# Patient Record
Sex: FEMALE | Race: WHITE | NOT HISPANIC OR LATINO | Employment: OTHER | URBAN - METROPOLITAN AREA
[De-identification: names, ages, dates, MRNs, and addresses within clinical notes are randomized per-mention and may not be internally consistent; named-entity substitution may affect disease eponyms.]

---

## 2017-01-09 ENCOUNTER — TRANSFERRED RECORDS (OUTPATIENT)
Dept: HEALTH INFORMATION MANAGEMENT | Facility: CLINIC | Age: 62
End: 2017-01-09

## 2017-01-19 ENCOUNTER — TRANSFERRED RECORDS (OUTPATIENT)
Dept: HEALTH INFORMATION MANAGEMENT | Facility: CLINIC | Age: 62
End: 2017-01-19

## 2017-02-22 ENCOUNTER — PRE VISIT (OUTPATIENT)
Dept: ORTHOPEDICS | Facility: CLINIC | Age: 62
End: 2017-02-22

## 2017-02-22 NOTE — TELEPHONE ENCOUNTER
1.  Date/reason for appt: 6/28/17 -- degenerative disk disease  2.  Referring provider: Minerva Tobias  3.  Call to patient (Yes / No - short description): no, transferred from Call Center  4.  Previous care at / records requested from: [facility] -- faxed records request.  5.  Other: Spoke to pt, she has records at:    -[facility] (Dr. Vahid Lora)     -[facility] [place] -- pt will fax her reports to me    -PT at [facility] (Dr. Gould)        Emailed TIFF's to [email or web address].

## 2017-02-22 NOTE — TELEPHONE ENCOUNTER
Received ROIs, faxed with requests.    Received Radiology reports from pt.  Will forward to clinic.    MRI Lspine 1/19/17 at [facility] (requested image) ---image in Pacs     MRI Pelvis 1/19/17 at [facility] (requested image) ---image in Pacs    Lumbar Nerve Block 3/23/16 at [facility]    Right L5-S1 Facet joint injection at [facility]

## 2017-02-23 NOTE — TELEPHONE ENCOUNTER
Records received from [facility].   Included:  Office notes: 2/25/08, 5/12/11, 5/16/11, 10/19/11, 10/21/11, 10/24/11, 10/28/11, 11/2/11, 11/9/11, 11/16/11, 11/18/11, 11/21/11, 9/1/16, 8/3/16, 8/9/16, 8/16/16, 8/25/16, 9/1/16  Radiology reports: xray lumbar on 6/27/13, 9/19/13- [facility], MR lumbar on 10/29/15- [facility], CT lumbar on 11/12/15- [facility], injection L5-S on 3/2/16-[facility]

## 2017-02-23 NOTE — TELEPHONE ENCOUNTER
Records received from [facility].   Included:  Office notes: 1/19/17, 1/9/17, 11/15/16, 11/3/16, 10/3/16, 9/21/16  Other: injection on 10/3/16 from [facility], 1/19/17, 11/15/16, 9/21/16    Missing/needed records: 2013 L4-5 fusion at [facility]

## 2017-02-24 NOTE — TELEPHONE ENCOUNTER
Received imaging CD from [facility], sent to [facility] by .    Faxed imaging request to [facility].  Faxed request to [facility] for op-notes.

## 2017-02-27 NOTE — TELEPHONE ENCOUNTER
L4-L5 op-note on 3/12/13 with implant records received from [facility]. Will forward to clinic but records are blurry, will need to get additional copy.

## 2017-04-24 NOTE — TELEPHONE ENCOUNTER
Op Note: 3/19/13 TLIF right side L4-5 / Posterior Fusion L4-5 / Bilat facetectomy/formamitomy/ decompression L4-05 / Insertion fusion devices/ Posterior pedicle instrumentation bilat L4-5 (Surgeon: / Jose Pennington @ [place]) - received & forwarded to clinic.

## 2017-04-24 NOTE — TELEPHONE ENCOUNTER
Per email from pt, she saw also Dr. Jose Pennington at [facility].  Emailed TIFF for pt to sign and return to me.

## 2017-04-26 NOTE — TELEPHONE ENCOUNTER
Records received from [facility]. Imaging disc is being mailed.   Included  Office notes: 4/7/16, 3/18/16, 2/25/16, 11/12/15, 11/2/15, 2013, 2012  Radiology reports: lumbar nerve branch blocks on 3/23/16-[facility]   Injection on 3/2/16-[facility]   CT lumbar on 11/12/15-[facility]    Xray lumbar on 4/25/13-[facility]    MR lumbar on 11/17/12-[facility]   Op/Procedure notes: 3/12/13 op-note included from [facility]   Other: EMG on 4/19/16- [facility]

## 2017-04-26 NOTE — TELEPHONE ENCOUNTER
Received 2 imaging discs from [facility].  Sent to [facility] by .

## 2017-06-25 ASSESSMENT — ENCOUNTER SYMPTOMS
POLYPHAGIA: 0
BLOOD IN STOOL: 0
EXERCISE INTOLERANCE: 1
BLOATING: 0
BOWEL INCONTINENCE: 0
TACHYCARDIA: 1
VOMITING: 0
RECTAL BLEEDING: 0
SLEEP DISTURBANCES DUE TO BREATHING: 0
CONSTIPATION: 1
MUSCLE CRAMPS: 0
LEG SWELLING: 0
BACK PAIN: 1
DIARRHEA: 0
LEG PAIN: 1
RECTAL PAIN: 0
FEVER: 0
INCREASED ENERGY: 1
STIFFNESS: 1
HEARTBURN: 0
SYNCOPE: 0
ALTERED TEMPERATURE REGULATION: 0
PALPITATIONS: 1
ABDOMINAL PAIN: 0
MYALGIAS: 1
HYPERTENSION: 1
CHILLS: 0
LIGHT-HEADEDNESS: 1
JAUNDICE: 0
DECREASED APPETITE: 0
HALLUCINATIONS: 0
POLYDIPSIA: 0
HYPOTENSION: 1
JOINT SWELLING: 1
NIGHT SWEATS: 0
CLAUDICATION: 0
ARTHRALGIAS: 1
NECK PAIN: 0
ORTHOPNEA: 0
MUSCLE WEAKNESS: 1
FATIGUE: 1
WEIGHT LOSS: 0
WEIGHT GAIN: 0
NAUSEA: 0

## 2017-06-28 ENCOUNTER — OFFICE VISIT (OUTPATIENT)
Dept: ORTHOPEDICS | Facility: CLINIC | Age: 62
End: 2017-06-28

## 2017-06-28 VITALS — BODY MASS INDEX: 22.02 KG/M2 | HEIGHT: 64 IN | WEIGHT: 129 LBS

## 2017-06-28 DIAGNOSIS — M46.1 SACROILIITIS (H): Primary | ICD-10-CM

## 2017-06-28 RX ORDER — ALENDRONATE SODIUM 70 MG/1
70 TABLET ORAL WEEKLY
COMMUNITY
Start: 2009-03-01 | End: 2021-10-12

## 2017-06-28 RX ORDER — ZOLPIDEM TARTRATE 5 MG/1
5 TABLET ORAL
COMMUNITY
Start: 2016-12-21

## 2017-06-28 RX ORDER — LISINOPRIL 20 MG/1
TABLET ORAL
COMMUNITY
End: 2017-08-03

## 2017-06-28 RX ORDER — GINSENG 100 MG
50 CAPSULE ORAL
COMMUNITY
Start: 2017-03-01 | End: 2019-10-24

## 2017-06-28 RX ORDER — DIPHENHYDRAMINE HYDROCHLORIDE 25 MG/1
1 TABLET ORAL EVERY EVENING
COMMUNITY
Start: 2016-06-01 | End: 2019-02-21

## 2017-06-28 RX ORDER — AMLODIPINE BESYLATE 10 MG/1
5 TABLET ORAL EVERY MORNING
COMMUNITY
Start: 2017-06-22 | End: 2019-02-21

## 2017-06-28 RX ORDER — HYDROCHLOROTHIAZIDE 25 MG/1
12.5 TABLET ORAL EVERY MORNING
COMMUNITY
Start: 2017-06-22

## 2017-06-28 RX ORDER — ZINC OXIDE 13 %
1 CREAM (GRAM) TOPICAL
COMMUNITY
Start: 2017-01-01 | End: 2021-10-12

## 2017-06-28 NOTE — LETTER
6/28/2017       RE: Inge Son  [address] [place] [address]     Dear Colleague,    Thank you for referring your patient, Inge Son, to the [facility] at [facility]. Please see a copy of my visit note below.      Spine Surgery Consultation    REFERRING PHYSICIAN: Era Tobias            Chief Concern:   Evaluate for R sacroilitis           History of Present Illness:   This patient is a 62 year old female with a significant past medical history of CVA 10 years ago with resultant L hearing loss who presents today for evaluation for R low back pain.     Underwent 2013 L4-5 TLIF at [facility].  Did well with excellent pain relief until early 2015.  Played a significant amount of golf at that time, and had new onset R low back pain.  Present since that time, and disabling, resulting in activity restriction.      Prior SI joint injection helped some.  Has also had medial bundle blocks, minimally helpful.    Referred to Dr Neal for evaluation.    Symptom Profile  Location of symptoms:   R PSIS region  Onset:   Insidious 2 years ago  Duration of symptoms:   2 years  Quality of symptoms:   achy  Severity:   severe  Exacerbating:    walking  Alleviate:   Rest  Radiation:  R poterior leg above knee level      Pain medications:  None      Oswestry Disability Score: 52  Dyymtc56: 9 physical, 8 mental   Pain Scale: 7 back, 5.5 R leg    Previous spine surgery includes:    2013 TLIF L4-5  ([facility])            Past Medical History:   CVA 10 years ago  Osteopenia         Past Surgical History:   2013 TLIF L4-5  ([facility])         Social History:   Smoking: none  Alcohol: rare  Street drugs: none  Living situation:  With   Ambulatory status:  indepenent    Social History   Substance Use Topics     Smoking status: Not on file     Smokeless tobacco: Not on file     Alcohol use Not on file            Family History:   Denies family history of bleeding  or clotting disorders  No family history on file.         Allergies:   No Known Allergies         Medications:   Anticoagulants:  None  Norvasc  Fosamax          Review of Systems:   10 point review of systems was performed, and was negative except as noted in HPI.         Physical Exam:   BMI: Body mass index is 22.16 kg/(m^2).   General: awake, alert, cooperative, no apparent distress, appears stated age.    HEENT: normal  Respiratory: breathing non-labored  Cardiovascular: peripheral pulses palpable and symmetric, capillary refill < 2sec  Abdomen:  Nondistended  Skin: no rashes or lesions  Heme:  No petechiae  Neurological: CN II-XII grossly intact  Musculoskeletal:      Spine:   Stands erect with no list or stoop   Overall neutral sagittal alignment   No scoliotic curves grossly evident, negative moody test     No tenderness to spine   No pain with stork, or looking back over shoulder and loading facets         --------------------------------          L2-3: Hip flexion L and R 5/5 strength         L4:  Knee extension L and R 5/5 strength        L5:  Foot / EHL dorsiflexion L and R 5/5 strength        S1:  Plantarflexion  L and R 5/5 strength         --------------------------------         Sacroiliac exam:   + Daya finger   + PSIS tenderness   - Chapin   - AP compression   - Thigh thrust   - Lateral compression   - Gaenslen's   - Sacral thrust           Imaging:   CT:  Posterolateral fusion of L4-5 TLIF.  Fusion of interbody region, though not as robust as would be expected.      MRI L spine:  No nerve root impingement.             Assessment and Plan:   Assessment:  Suspect R sacroiliitis based on prior SI injection with improvement, though exam not consistent.      Lengthy discussion today of possible pain generators, including of the SI joint.  Does not appear to have            Plan:  1. CT guided R SI injection (local anesthetic only)  1. Do aggravating activities immediately before and after to see if  significantly relieves  2. Follow up after injection to discuss next steps      Devon Saleh MD  Orthopaedic Surgery PGY-4  Pager:  [phone]    I saw and evaluated the patient on the day of the visit and I formulated the plan.  Will Neal MD        CC  Copy to patient     [address] [facility] [address]

## 2017-06-28 NOTE — MR AVS SNAPSHOT
"              After Visit Summary   6/28/2017    Inge Son    MRN: [id number]           Patient Information     Date Of Birth          1955        Visit Information        Provider Department      6/28/2017 9:00 AM Will Neal MD [facility]        Today's Diagnoses     Sacroiliitis (H)    -  1       Follow-ups after your visit        Who to contact     Please call your clinic at [phone] to:    Ask questions about your health    Make or cancel appointments    Discuss your medicines    Learn about your test results    Speak to your doctor   If you have compliments or concerns about an experience at your clinic, or if you wish to file a complaint, please contact [facility] Patient Relations at [phone] or email us at [email or web address]         Additional Information About Your Visit        MyChart Information     [employer] gives you secure access to your electronic health record. If you see a primary care provider, you can also send messages to your care team and make appointments. If you have questions, please call your primary care clinic.  If you do not have a primary care provider, please call [phone] and they will assist you.      [employer] is an electronic gateway that provides easy, online access to your medical records. With [employer], you can request a clinic appointment, read your test results, renew a prescription or communicate with your care team.     To access your existing account, please contact your [facility] Clinic or call [phone] for assistance.        Care EveryWhere ID     This is your Care EveryWhere ID. This could be used by other organizations to access your [facility] medical records  [id number]        Your Vitals Were     Height BMI (Body Mass Index)                1.625 m (5' 3.98\") 22.16 kg/m2           Blood Pressure from Last 3 Encounters:   No data found for BP    Weight from " Last 3 Encounters:   06/28/17 58.5 kg (129 lb)               Primary Care Provider    None Specified       No primary provider on file.        Equal Access to Services     JOSE BARBA : Hadii aad ku hadbethanie Betancur, jena hurst, michael fierro, amparo carranza. So [facility] [phone].    ATENCIÓN: Si habla español, tiene a mosley disposición servicios gratuitos de asistencia lingüística. Llame al [phone].    We comply with applicable federal civil rights laws and [place] laws. We do not discriminate on the basis of race, color, national origin, age, disability sex, sexual orientation or gender identity.            Thank you!     Thank you for choosing [facility]  for your care. Our goal is always to provide you with excellent care. Hearing back from our patients is one way we can continue to improve our services. Please take a few minutes to complete the written survey that you may receive in the mail after your visit with us. Thank you!             Your Updated Medication List - Protect others around you: Learn how to safely use, store and throw away your medicines at www.disposemymeds.org.          This list is accurate as of: 6/28/17 11:59 PM.  Always use your most recent med list.                   Brand Name Dispense Instructions for use Diagnosis    acetaminophen-codeine 300-30 MG per tablet    TYLENOL #3     1 tablet as needed        alendronate 70 MG tablet    FOSAMAX     once a week        amLODIPine 10 MG tablet    NORVASC          BIOTIN 5000 5 MG Caps   Generic drug:  biotin           CALCIUM 600/VITAMIN D3 600-800 MG-UNIT Tabs   Generic drug:  Calcium Carb-Cholecalciferol           CENTRUM SILVER ADULT 50+ PO           hydrochlorothiazide 25 MG tablet    HYDRODIURIL          lisinopril 20 MG tablet    PRINIVIL/ZESTRIL          PROBIOTIC DAILY Caps           STOOL SOFTENER & LAXATIVE PO           SUPER B-COMPLEX Tabs           zinc 50 MG Tabs            zolpidem 5 MG tablet    AMBIEN

## 2017-06-28 NOTE — NURSING NOTE
"Reason For Visit:   Chief Complaint   Patient presents with     Back Pain     low right back pain       Primary MD: Dr. Minerva Tobias  Ref. MD: same      Occupation retired.  Date of injury: none    Date of surgery: 3/2013  Type of surgery: lumbar fusion, ANW.  Smoker: No      Ht 1.625 m (5' 3.98\")  Wt 58.5 kg (129 lb)  BMI 22.16 kg/m2    Pain Assessment  Patient Currently in Pain: Yes  0-10 Pain Scale:  (0 - 9)  Primary Pain Location: Back  Pain Orientation: Lower, Right  Pain Descriptors: Other (comment) (sting / pinch)  Alleviating Factors: Other (comment) (lying flat)  Aggravating Factors: Walking    Oswestry (CICI) Questionnaire    OSWESTRY DISABILITY INDEX 6/25/2017   SECTION 1-PAIN INTENSITY 3  The pain is fairly severe at the moment.   SECTION 2-PERSONAL CARE (WASHING,DRESSING,ETC.) 2  It is painful to look after myself and I am slow and careful.   SECTION 3-LIFTING 3  Pain prevents me from lifting heavy weights but I can manage light to medium weights if they are conveniently positioned.   SECTION 4-WALKING 2  Pain prevents me from walking more than a quarter of a mile.   SECTION 5-SITTING 3  Pain prevents me from sitting for more than half an hour   SECTION 6-STANDING 3  Pain prevents me from standing for more than half an hour.   SECTION 7-SLEEPING 2  Because of pain I have less than 6 hours sleep.   SECTION 8-SEX LIFE (IF APPLICABLE) 3  My sex life is severely restricted by pain.   SECTION 9-SOCIAL LIFE 3  Pain has restricted my social life and I do not go out as often.   SECTION 10-TRAVELING 2  Pain is bad but I am able to manage trips over two hours.   Oswestry Disability Index: Count 10   CICI: Total Score = SUM (total for answered questions) 26   Computed Oswestry Score 52 (%)                      Numeric Rating Scale:  VAS Scores     VAS Survey 6/25/2017   What is your level of back pain during the last week: 7.0   What is your level of RIGHT leg pain during the last week: 5.5   What is your level of " LEFT leg pain during the last week: 0   What is your level of neck pain during the last week: 0   What is your level of RIGHT arm pain during the last week: 0   What is your level of LEFT arm pain during the last week: 0                Promis 10 Assessment    PROMIS 10 6/25/2017   In general, would you say your health is: Good = 3   In general, would you say your quality of life is: Poor = 1   In general, how would you rate your physical health? Fair = 2   In general, how would you rate your mental health, including your mood and your ability to think? Good = 3   In general, how would you rate your satisfaction with your social activities and relationships? Poor = 1   In general, please rate how well you carry out your usual social activities and roles Fair = 2   To what extent are you able to carry out your everyday physical activities such as walking, climbing stairs, carrying groceries, or moving a chair? A Little = 2   How often have you been bothered by emotional problems such as feeling anxious, depressed or irritable? Sometimes = 3   How would you rate your fatigue on average? Moderate = 3   How would you rate your pain on average?   0 = No Pain  to  10 = Worst Imaginable Pain 7   Global Physical Health Score : Raw Score 9 (SUM : G03 - G06 - G07 - G08)   Global Mentall Health Score : Raw Score 8 (SUM : G02 - G04 - G05 - G10)   Total (Physical + Mental Health Score) 17

## 2017-06-28 NOTE — PROGRESS NOTES
Spine Surgery Consultation    REFERRING PHYSICIAN: Era Tobias            Chief Concern:   Evaluate for R sacroilitis           History of Present Illness:   This patient is a 62 year old female with a significant past medical history of CVA 10 years ago with resultant L hearing loss who presents today for evaluation for R low back pain.     Underwent 2013 L4-5 TLIF at [facility] [place].  Did well with excellent pain relief until early 2015.  Played a significant amount of golf at that time, and had new onset R low back pain.  Present since that time, and disabling, resulting in activity restriction.      Prior SI joint injection helped some.  Has also had medial bundle blocks, minimally helpful.    Referred to Dr Neal for evaluation.    Symptom Profile  Location of symptoms:   R PSIS region  Onset:   Insidious 2 years ago  Duration of symptoms:   2 years  Quality of symptoms:   achy  Severity:   severe  Exacerbating:    walking  Alleviate:   Rest  Radiation:  R poterior leg above knee level      Pain medications:  None      Oswestry Disability Score: 52  Hqaehf95: 9 physical, 8 mental   Pain Scale: 7 back, 5.5 R leg    Previous spine surgery includes:    2013 TLIF L4-5  ([facility])            Past Medical History:   CVA 10 years ago  Osteopenia         Past Surgical History:   2013 TLIF L4-5  ([facility])         Social History:   Smoking: none  Alcohol: rare  Street drugs: none  Living situation:  With   Ambulatory status:  indepenent    Social History   Substance Use Topics     Smoking status: Not on file     Smokeless tobacco: Not on file     Alcohol use Not on file            Family History:   Denies family history of bleeding or clotting disorders  No family history on file.         Allergies:   No Known Allergies         Medications:   Anticoagulants:  None  Norvasc  Fosamax          Review of Systems:   10 point review of systems was performed, and was negative except as noted in HPI.         Physical  Exam:   BMI: Body mass index is 22.16 kg/(m^2).   General: awake, alert, cooperative, no apparent distress, appears stated age.    HEENT: normal  Respiratory: breathing non-labored  Cardiovascular: peripheral pulses palpable and symmetric, capillary refill < 2sec  Abdomen:  Nondistended  Skin: no rashes or lesions  Heme:  No petechiae  Neurological: CN II-XII grossly intact  Musculoskeletal:      Spine:   Stands erect with no list or stoop   Overall neutral sagittal alignment   No scoliotic curves grossly evident, negative moody test     No tenderness to spine   No pain with stork, or looking back over shoulder and loading facets         --------------------------------          L2-3: Hip flexion L and R 5/5 strength         L4:  Knee extension L and R 5/5 strength        L5:  Foot / EHL dorsiflexion L and R 5/5 strength        S1:  Plantarflexion  L and R 5/5 strength         --------------------------------         Sacroiliac exam:   + Daya finger   + PSIS tenderness   - Chapin   - AP compression   - Thigh thrust   - Lateral compression   - Gaenslen's   - Sacral thrust           Imaging:   CT:  Posterolateral fusion of L4-5 TLIF.  Fusion of interbody region, though not as robust as would be expected.      MRI L spine:  No nerve root impingement.             Assessment and Plan:   Assessment:  Suspect R sacroiliitis based on prior SI injection with improvement, though exam not consistent.      Lengthy discussion today of possible pain generators, including of the SI joint.  Does not appear to have            Plan:  1. CT guided R SI injection (local anesthetic only)  1. Do aggravating activities immediately before and after to see if significantly relieves  2. Follow up after injection to discuss next steps      Devon Saleh MD  Orthopaedic Surgery PGY-4  Pager:  [phone]    I saw and evaluated the patient on the day of the visit and I formulated the plan.  Will Neal MD        CC  Copy to patient      [address] [facility] [address]          Answers for HPI/ROS submitted by the patient on 6/25/2017   General Symptoms: Yes  Skin Symptoms: No  HENT Symptoms: No  EYE SYMPTOMS: No  HEART SYMPTOMS: Yes  LUNG SYMPTOMS: No  INTESTINAL SYMPTOMS: Yes  URINARY SYMPTOMS: No  GYNECOLOGIC SYMPTOMS: No  BREAST SYMPTOMS: No  SKELETAL SYMPTOMS: Yes  BLOOD SYMPTOMS: No  NERVOUS SYSTEM SYMPTOMS: No  MENTAL HEALTH SYMPTOMS: No  Fever: No  Loss of appetite: No  Weight loss: No  Weight gain: No  Fatigue: Yes  Night sweats: No  Chills: No  Increased stress: Yes  Excessive hunger: No  Excessive thirst: No  Feeling hot or cold when others believe the temperature is normal: No  Loss of height: No  Post-operative complications: No  Surgical site pain: No  Hallucinations: No  Change in or Loss of Energy: Yes  Hyperactivity: No  Confusion: No  Chest pain or pressure: Yes  Fast or irregular heartbeat: Yes  Pain in legs with walking: Yes  Swelling in feet or ankles: No  Trouble breathing while lying down: No  Fingers or Toes appear blue: No  High blood pressure: Yes  Low blood pressure: Yes  Fainting: No  Murmurs: No  Chest pain on exertion: Yes  Chest pain at rest: No  Cramping pain in leg during exercise: No  Pacemaker: No  Varicose veins: No  Edema or swelling: No  Fast heart beat: Yes  Wake up at night with shortness of breath: No  Heart flutters: No  Light-headedness: Yes  Exercise intolerance: Yes  Heart burn or indigestion: No  Nausea: No  Vomiting: No  Abdominal pain: No  Bloating: No  Constipation: Yes  Diarrhea: No  Blood in stool: No  Black stools: No  Rectal or Anal pain: No  Fecal incontinence: No  Rectal bleeding: No  Yellowing of skin or eyes: No  Vomit with blood: No  Change in stools: No  Hemorrhoids: No  Back pain: Yes  Muscle aches: Yes  Neck pain: No  Swollen joints: Yes  Joint pain: Yes  Bone pain: Yes  Muscle cramps: No  Muscle weakness: Yes  Joint stiffness: Yes  Bone fracture: No

## 2017-07-12 ENCOUNTER — TELEPHONE (OUTPATIENT)
Dept: ORTHOPEDICS | Facility: CLINIC | Age: 62
End: 2017-07-12

## 2017-07-12 DIAGNOSIS — M46.1 SACROILIITIS (H): Primary | ICD-10-CM

## 2017-07-12 NOTE — TELEPHONE ENCOUNTER
Pt. Called to report results of Right SacroIliac Joint injection NO steroid done 7-3-17 at [facility]. Pt. States 100% relief of Right leg pain.   She states rated Right LBP & Right leg pain 5-6 before & half an hour after rated 2-3,  1 hour after rated 1.  She walked 3/4 mile after & felt much better.  She went golfing same day as instructed to increase activity after injection & had Return of back & Right leg pain rated 5.    Reviewed reports & imaging with KOBI Jones who ordered CT guided Right SacroIliac  Joint injection with steroid  & RTC appt. To go over these injections & make plan.  Pt stated understanding & will schedule.  Call back prn.  V.[email or web address].Olivia PEACE//Amada Hill RN.

## 2017-08-01 ASSESSMENT — ENCOUNTER SYMPTOMS
MYALGIAS: 0
ORTHOPNEA: 0
MUSCLE WEAKNESS: 0
NECK PAIN: 0
STIFFNESS: 1
ARTHRALGIAS: 1
HYPOTENSION: 1
POOR WOUND HEALING: 0
SLEEP DISTURBANCES DUE TO BREATHING: 0
SKIN CHANGES: 0
JOINT SWELLING: 1
PALPITATIONS: 0
TACHYCARDIA: 0
SYNCOPE: 0
LEG PAIN: 0
MUSCLE CRAMPS: 0
LEG SWELLING: 1
CLAUDICATION: 0
BACK PAIN: 1
LIGHT-HEADEDNESS: 1
EXERCISE INTOLERANCE: 1
HYPERTENSION: 1
NAIL CHANGES: 0

## 2017-08-03 ENCOUNTER — OFFICE VISIT (OUTPATIENT)
Dept: ORTHOPEDICS | Facility: CLINIC | Age: 62
End: 2017-08-03

## 2017-08-03 VITALS — WEIGHT: 128.5 LBS | BODY MASS INDEX: 21.94 KG/M2 | HEIGHT: 64 IN

## 2017-08-03 DIAGNOSIS — M46.1 SACROILIITIS (H): Primary | ICD-10-CM

## 2017-08-03 RX ORDER — POTASSIUM CHLORIDE 600 MG/1
8 TABLET, FILM COATED, EXTENDED RELEASE ORAL
COMMUNITY

## 2017-08-03 NOTE — MR AVS SNAPSHOT
"              After Visit Summary   8/3/2017    Inge Son    MRN: [id number]           Patient Information     Date Of Birth          1955        Visit Information        Provider Department      8/3/2017 1:45 PM Will Neal MD [facility]        Today's Diagnoses     Sacroiliitis (H)    -  1       Follow-ups after your visit        Follow-up notes from your care team     Return if symptoms worsen or fail to improve.      Who to contact     Please call your clinic at [phone] to:    Ask questions about your health    Make or cancel appointments    Discuss your medicines    Learn about your test results    Speak to your doctor   If you have compliments or concerns about an experience at your clinic, or if you wish to file a complaint, please contact [facility] Patient Relations at [phone] or email us at [email or web address]         Additional Information About Your Visit        MyChart Information     [employer] gives you secure access to your electronic health record. If you see a primary care provider, you can also send messages to your care team and make appointments. If you have questions, please call your primary care clinic.  If you do not have a primary care provider, please call [phone] and they will assist you.      [employer] is an electronic gateway that provides easy, online access to your medical records. With [employer], you can request a clinic appointment, read your test results, renew a prescription or communicate with your care team.     To access your existing account, please contact your [facility] or call [phone] for assistance.        Care EveryWhere ID     This is your Care EveryWhere ID. This could be used by other organizations to access your [facility] medical records  [id number]        Your Vitals Were     Height BMI (Body Mass Index)                1.63 m (5' 4.17\") 21.94 kg/m2  "          Blood Pressure from Last 3 Encounters:   No data found for BP    Weight from Last 3 Encounters:   08/03/17 58.3 kg (128 lb 8 oz)   06/28/17 58.5 kg (129 lb)              Today, you had the following     No orders found for display         Today's Medication Changes          These changes are accurate as of: 8/3/17 11:59 PM.  If you have any questions, ask your nurse or doctor.               Stop taking these medicines if you haven't already. Please contact your care team if you have questions.     lisinopril 20 MG tablet   Commonly known as:  PRINIVIL/ZESTRIL                    Primary Care Provider    None Specified       No primary provider on file.        Equal Access to Services     [facility]: Hadedie Betancur, jena hurst, michael fierro, amparo partida . So [facility] [phone].    ATENCIÓN: Si habla español, tiene a mosley disposición servicios gratuitos de asistencia lingüística. Llame al [phone].    We comply with applicable federal civil rights laws and [place] laws. We do not discriminate on the basis of race, color, national origin, age, disability sex, sexual orientation or gender identity.            Thank you!     Thank you for choosing [facility]  for your care. Our goal is always to provide you with excellent care. Hearing back from our patients is one way we can continue to improve our services. Please take a few minutes to complete the written survey that you may receive in the mail after your visit with us. Thank you!             Your Updated Medication List - Protect others around you: Learn how to safely use, store and throw away your medicines at www.disposemymeds.org.          This list is accurate as of: 8/3/17 11:59 PM.  Always use your most recent med list.                   Brand Name Dispense Instructions for use Diagnosis    acetaminophen-codeine 300-30 MG per tablet    TYLENOL #3     1 tablet as needed         alendronate 70 MG tablet    FOSAMAX     once a week        amLODIPine 10 MG tablet    NORVASC          BIOTIN 5000 5 MG Caps   Generic drug:  biotin           CALCIUM 600/VITAMIN D3 600-800 MG-UNIT Tabs   Generic drug:  Calcium Carb-Cholecalciferol           CENTRUM SILVER ADULT 50+ PO           hydrochlorothiazide 25 MG tablet    HYDRODIURIL          KLOR-CON 8 MEQ CR tablet   Generic drug:  potassium chloride      Take 8 mEq by mouth daily        MAGNESIUM OXIDE PO      Take 400 mg by mouth daily        PROBIOTIC DAILY Caps           STOOL SOFTENER & LAXATIVE PO           SUPER B-COMPLEX Tabs           zinc 50 MG Tabs           zolpidem 5 MG tablet    AMBIEN

## 2017-08-03 NOTE — NURSING NOTE
"Reason For Visit:   Chief Complaint   Patient presents with     RECHECK     right SI pain, s/p injections 7/3/17 and 7/24/17 (good relief)       Primary MD: Dr. Minerva Tobias  Cardiologist: Dr. Gibson, [facility]  Ref. MD: Dr. Minerva Tobias      Occupation retired .  Date of injury: none    Date of surgery: 3/2013  Type of surgery: lumbar fusion, ANW.  Smoker: No      Ht 1.63 m (5' 4.17\")  Wt 58.3 kg (128 lb 8 oz)  BMI 21.94 kg/m2    Pain Assessment  Patient Currently in Pain: Yes  0-10 Pain Scale: 1  Primary Pain Location: Other (Comment) (SI)  Pain Orientation: Right  Other Pain Locations: right posterior thigh  Pain Descriptors: Patient unable to describe, Other (comment) (pain is barely there)  Alleviating Factors: Other (comment) (getting up helps)  Aggravating Factors: Sitting    Oswestry (CICI) Questionnaire    OSWESTRY DISABILITY INDEX 8/1/2017   Section 1 - Pain intensity 1   Section 2 - Personal care (washing, dressing, etc.)  0   Section 3 - Lifting 2   Section 4 - Walking 1   Section 5 - Sitting 2   Section 6 - Standing 2   Section 7 - Sleeping 0   Section 8 - Sex life (if applicable) 1   Section 9 - Social life 2   Section 10 - Traveling 1   Count 10   Sum 12   Oswestry Score (%) 24   SECTION 1-PAIN INTENSITY The pain is very mild at the moment.   SECTION 2-PERSONAL CARE (WASHING,DRESSING,ETC.) I can look after myself normally without causing additional pain.   SECTION 3-LIFTING Pain prevents me from lifting heavy weights off the floor but I can manage if they are conveniently positioned, e.g. on a table.   SECTION 4-WALKING Pain prevents me from walking more than one mile.   SECTION 5-SITTING Pain prevents me from sitting for more than 1 hour.   SECTION 6-STANDING Pain prevents me from standing for more than 1 hour.   SECTION 7-SLEEPING My sleep is never interrupted by pain.   SECTION 8-SEX LIFE (IF APPLICABLE) My sex life is normal but causes some additional pain.   SECTION " 9-SOCIAL LIFE Pain has no significant effect on my social life apart from limiting my more energetic interests, e.g. sport, etc.   SECTION 10-TRAVELING I can travel anywhere but it gives me additional pain.   Oswestry Disability Index: Count 10   CICI: Total Score = SUM (total for answered questions) 12   Computed Oswestry Score 24 (%)                      Numeric Rating Scale:  VAS Scores     VAS Survey 8/1/2017   What is your level of back pain during the last week: 2.5   What is your level of RIGHT leg pain during the last week: 2.5   What is your level of LEFT leg pain during the last week: 0   What is your level of neck pain during the last week: 0   What is your level of RIGHT arm pain during the last week: 0   What is your level of LEFT arm pain during the last week: 0                Promis 10 Assessment    PROMIS 10 8/1/2017   In general, would you say your health is: Good   In general, would you say your quality of life is: Good   In general, how would you rate your physical health? Fair   In general, how would you rate your mental health, including your mood and your ability to think? Very good   In general, how would you rate your satisfaction with your social activities and relationships? Good   In general, please rate how well you carry out your usual social activities and roles Good   To what extent are you able to carry out your everyday physical activities such as walking, climbing stairs, carrying groceries, or moving a chair? Mostly   How often have you been bothered by emotional problems such as feeling anxious, depressed or irritable? Sometimes   How would you rate your fatigue on average? Moderate   How would you rate your pain on average?   0 = No Pain  to  10 = Worst Imaginable Pain 3   Global Physical Health Score : Raw Score 13 (SUM : G03 - G06 - G07 - G08)   Global Mentall Health Score : Raw Score 13 (SUM : G02 - G04 - G05 - G10)   Total (Physical + Mental Health Score) 26   In general,  would you say your health is: 3   In general, would you say your quality of life is: 3   In general, how would you rate your physical health? 2   In general, how would you rate your mental health, including your mood and your ability to think? 4   In general, how would you rate your satisfaction with your social activities and relationships? 3   In general, please rate how well you carry out your usual social activities and roles. (This includes activities at home, at work and in your community, and responsibilities as a parent, child, spouse, employee, friend, etc.) 3   To what extent are you able to carry out your everyday physical activities such as walking, climbing stairs, carrying groceries, or moving a chair? 4   In the past 7 days, how often have you been bothered by emotional problems such as feeling anxious, depressed, or irritable? 3   In the past 7 days, how would you rate your fatigue on average? 3   In the past 7 days, how would you rate your pain on average, where 0 means no pain, and 10 means worst imaginable pain? 3   Global Mental Health Score 13   Global Physical Health Score 13   PROMIS TOTAL - SUBSCORES 26   Pain question re-calculation - no clinical value 4

## 2017-08-03 NOTE — LETTER
8/3/2017       RE: Inge Son  [address] [facility] [place] [facility] [address]     Dear Colleague,    Thank you for referring your patient, Inge Son, to the [facility] at [facility]. Please see a copy of my visit note below.    REASON FOR VISIT: Recheck right SI joint pain    REFERRING PHYSICIAN: Established Patient     PRIMARY CARE PHYSICIAN: No primary care provider on file.    HISTORY OF PRESENT ILLNESS: Inge Son is a 62 year old female who returns to clinic today for further evaluation of her right sacroiliac joint pain.  She was previously seen on 6/28/17 and was sent for a CT-guided diagnostic injection.  That was performed on 7/3/17, and she reported 50% improvement in her symptoms at that time.  She later called for a repeat injection, which was performed on 7/24/17 with the addition of cortisone.  She states that she has gotten good relief from the injection.  She notes a slight increase in pain when seated for prolonged periods of time.  However, the grinding pain with walking has resolved.     Oswestry score: 24% (previously 52%)  Dwoiln91: 26 (previously 17)  Pain scale: 1 (previously 0-9)    PHYSICAL EXAM:   Constitutional - Patient is healthy, well-nourished and appears stated age.    BMI = 21.94    Respiratory - Patient is breathing normally and in no respiratory distress.   Skin - No suspicious rashes or lesions.   Psychiatric - Normal mood and affect.   Eyes - Visual acuity is normal to the written word.   ENT - Hearing intact to the spoken word.   GI - No abdominal distention.   Musculoskeletal - Non-antalgic gait without use of assistive devices.    IMAGING: The results of two CT-guided injections were reviewed today.  They show good placement of the injections. See full radiologic report in chart.    CLINICAL ASSESSMENT:   Right sacroiliitis    DISCUSSION/PLAN:   Inge is a 62 year old female who returned to clinic today to discuss the  results of her recent CT-guided right sacroiliac joint injections.  She reported good relief of her symptoms, confirming the SI joint as the source of her pain.  We discussed future treatment options, including regular cortisone injections versus SI joint fusion.  As long as she continues to have relief with the injections, she can continue this treatment.  If she reaches a point where injections are no longer helpful, and she cannot live with her symptoms, she can return to clinic to discuss alternative treatment options.  She understands and agrees with this plan.  She also complained of tightness in the hamstrings, and she can try gentle stretching on her own or with a physical therapist.  If she needs a referral, she can call the office.    All questions and concerns were answered to the patient's apparent satisfaction before leaving the clinic.     Thank you for allowing Dr. Neal and I to participate in the care of Inge Son.    Respectfully,  Olivia Jones PA-C    I saw and evaluated the patient on the day of the visit and I formulated the plan.  Will Neal MD      CC  Copy to patient  [address] [id number]    Again, thank you for allowing me to participate in the care of your patient.      Sincerely,    Will Neal MD

## 2017-08-04 NOTE — PROGRESS NOTES
REASON FOR VISIT: Recheck right SI joint pain    REFERRING PHYSICIAN: Established Patient     PRIMARY CARE PHYSICIAN: No primary care provider on file.    HISTORY OF PRESENT ILLNESS: Inge Son is a 62 year old female who returns to clinic today for further evaluation of her right sacroiliac joint pain.  She was previously seen on 6/28/17 and was sent for a CT-guided diagnostic injection.  That was performed on 7/3/17, and she reported 50% improvement in her symptoms at that time.  She later called for a repeat injection, which was performed on 7/24/17 with the addition of cortisone.  She states that she has gotten good relief from the injection.  She notes a slight increase in pain when seated for prolonged periods of time.  However, the grinding pain with walking has resolved.     Oswestry score: 24% (previously 52%)  Ddeydu95: 26 (previously 17)  Pain scale: 1 (previously 0-9)    PHYSICAL EXAM:   Constitutional - Patient is healthy, well-nourished and appears stated age.    BMI = 21.94    Respiratory - Patient is breathing normally and in no respiratory distress.   Skin - No suspicious rashes or lesions.   Psychiatric - Normal mood and affect.   Eyes - Visual acuity is normal to the written word.   ENT - Hearing intact to the spoken word.   GI - No abdominal distention.   Musculoskeletal - Non-antalgic gait without use of assistive devices.      IMAGING: The results of two CT-guided injections were reviewed today.  They show good placement of the injections. See full radiologic report in chart.    CLINICAL ASSESSMENT:   Right sacroiliitis    DISCUSSION/PLAN:   Inge is a 62 year old female who returned to clinic today to discuss the results of her recent CT-guided right sacroiliac joint injections.  She reported good relief of her symptoms, confirming the SI joint as the source of her pain.  We discussed future treatment options, including regular cortisone injections versus SI joint fusion.  As long as she  continues to have relief with the injections, she can continue this treatment.  If she reaches a point where injections are no longer helpful, and she cannot live with her symptoms, she can return to clinic to discuss alternative treatment options.  She understands and agrees with this plan.  She also complained of tightness in the hamstrings, and she can try gentle stretching on her own or with a physical therapist.  If she needs a referral, she can call the office.    All questions and concerns were answered to the patient's apparent satisfaction before leaving the clinic.     Thank you for allowing Dr. Neal and I to participate in the care of Inge Son.    Respectfully,  Olivia Jones PA-C    I saw and evaluated the patient on the day of the visit and I formulated the plan.  Will Neal MD      CC  Copy to patient    [address] [id number]    Answers for HPI/ROS submitted by the patient on 8/1/2017   General Symptoms: No  Skin Symptoms: Yes  HENT Symptoms: No  EYE SYMPTOMS: No  HEART SYMPTOMS: Yes  LUNG SYMPTOMS: No  INTESTINAL SYMPTOMS: No  URINARY SYMPTOMS: No  GYNECOLOGIC SYMPTOMS: No  BREAST SYMPTOMS: No  SKELETAL SYMPTOMS: Yes  BLOOD SYMPTOMS: No  NERVOUS SYSTEM SYMPTOMS: No  MENTAL HEALTH SYMPTOMS: No  Changes in hair: No  Changes in moles/birth marks: No  Itching: No  Rashes: Yes  Changes in nails: No  Acne: No  Hair in places you don't want it: No  Change in facial hair: No  Warts: No  Non-healing sores: No  Scarring: No  Flaking of skin: No  Color changes of hands/feet in cold : No  Sun sensitivity: No  Skin thickening: No  Chest pain or pressure: Yes  Fast or irregular heartbeat: No  Pain in legs with walking: No  Swelling in feet or ankles: Yes  Trouble breathing while lying down: No  Fingers or Toes appear blue: No  High blood pressure: Yes  Low blood pressure: Yes  Fainting: No  Murmurs: No  Chest pain on exertion: No  Chest pain at rest: No  Cramping pain in leg  during exercise: No  Pacemaker: No  Varicose veins: No  Edema or swelling: Yes  Fast heart beat: No  Wake up at night with shortness of breath: No  Heart flutters: No  Light-headedness: Yes  Exercise intolerance: Yes  Back pain: Yes  Muscle aches: No  Neck pain: No  Swollen joints: Yes  Joint pain: Yes  Bone pain: No  Muscle cramps: No  Muscle weakness: No  Joint stiffness: Yes  Bone fracture: No

## 2017-10-06 ASSESSMENT — ENCOUNTER SYMPTOMS
SKIN CHANGES: 0
MUSCLE WEAKNESS: 0
CLAUDICATION: 0
DEPRESSION: 0
NIGHT SWEATS: 0
POLYDIPSIA: 0
HYPOTENSION: 0
JOINT SWELLING: 1
DECREASED CONCENTRATION: 0
ALTERED TEMPERATURE REGULATION: 1
MUSCLE CRAMPS: 0
SYNCOPE: 0
MYALGIAS: 1
LIGHT-HEADEDNESS: 1
SLEEP DISTURBANCES DUE TO BREATHING: 0
INSOMNIA: 1
PALPITATIONS: 0
DECREASED APPETITE: 0
STIFFNESS: 1
POLYPHAGIA: 0
WEIGHT LOSS: 0
LEG SWELLING: 0
CHILLS: 0
POOR WOUND HEALING: 0
LEG PAIN: 1
NECK PAIN: 0
BACK PAIN: 1
HYPERTENSION: 0
ARTHRALGIAS: 1
INCREASED ENERGY: 1
FEVER: 0
NERVOUS/ANXIOUS: 1
TACHYCARDIA: 0
ORTHOPNEA: 0
PANIC: 0
EXERCISE INTOLERANCE: 1
WEIGHT GAIN: 0
NAIL CHANGES: 0
FATIGUE: 0

## 2017-10-12 ENCOUNTER — OFFICE VISIT (OUTPATIENT)
Dept: ORTHOPEDICS | Facility: CLINIC | Age: 62
End: 2017-10-12

## 2017-10-12 VITALS — HEIGHT: 64 IN | WEIGHT: 133.4 LBS | BODY MASS INDEX: 22.77 KG/M2

## 2017-10-12 DIAGNOSIS — M46.1 SACROILIITIS (H): Primary | ICD-10-CM

## 2017-10-12 NOTE — PROGRESS NOTES
REASON FOR VISIT:  Followup right sacroiliitis.       INTERVAL HISTORY:  This is Inge Son, 62-year-old female with a longstanding history of right SI joint area pain.  Her symptoms remain in her SI joint area on her right side which affect her on a daily basis.  She has difficulty even sitting on the right side because of her pain.  She is extremely limited in the amount of distance that she can walk and favors her right leg.  She feels that her right SI joint is unstable when she walks.  She has attempted many interventions to help relieve her pain.  Of note, she has been to therapy for at least fifteen sessions at 2 different sites to help her symptoms.  This minimally improved her symptoms only for a temporary period of time.  She also visits a chiropractor which provides only equivocal benefit.  She has tried interventions such as inversion tables and traction as well which show no improvement.  She has tried nerve stimulators w/ minimal to no relief.  She has tried nonsteroidal anti-inflammatory medications including Aleve which provide minimal and only temporary relief.  She has also tried an SI belt which does provide some relief while it is on, but does not eliminate her pain and she is still limited in spite of utilizing the belts.  She has also tried multiple injections in the right SI joint.  Most recently in 07/2017, she had a corticosteroid injection which she says relieved her pain for about 2 weeks.  Her pain relief was over 80% at that time.  She says she is able to do many of the things that she would like to do that she felt complete different.  Her pain symptoms have subsequently returned.  The patient returns today for surgical discussion, given the fact that she has failed all nonoperative interventions at this juncture.  She would like to be considered for an SI joint fusion as had previously been discussed.      PHYSICAL EXAMINATION:  Well-appearing female in no acute distress.  She is  pleasant in conversation with appropriate affect.   MUSCULOSKELETAL:  Patient has 5/5 strength in bilateral lower extremities.  She has some mild tenderness over the SI joint.  She is sensory intact in L2-S1.  Her 5/5 motor strength is L2-S1 as well.      IMAGING:  No new films available for review today.  Review of the CT-guided SI joint injection does demonstrate that the injection was indeed intra-articular into the SI joint from 07/2017.      ASSESSMENT:  Right sacroiliitis.      PLAN:  We had a long discussion today with Ms. Son regarding her diagnosis and potential options for management.  Management for the SI joint pain can include nonoperative modalities which she has exhausted.  Please see the interval history.  She has exhausted physical therapy, chiropractic work, nerve stimulators typical pain medications including nonsteroidals and Tylenol.  She has utilized an SI belt for a long period of time and has also seen relief with corticosteroid injections over 80 percent which is only temporary.  We discussed that surgically, we would recommend an SI joint fusion for her pain eminating from the SI joint.  The literature does support this extensively with 2 randomized controlled trials and several other studies.  For her part, Ms. Son has a [employer] plan which currently does not reimburse for sacroiliac joint fusions.  We have advised that the patient contact her insurance company, pursue getting this procedure  reimbursed.  We did discuss that we feel like that her SI joint fusion on the right could significantly improve her symptoms.  We discussed at length the risks and benefits of surgery.  We did discuss that with surgery, we would hope for at least a 50% improvement in her Oswestry Disability Index, which today gives a 46%.  We did discuss that it is unlikely that her score would go to 0 and that she would be completely pain-free.  We would like to have her participate in her normal activities  and live a more typical life.  We did again discuss at length the possible risks of surgery, which are detailed below.  The patient would like to pursue surgical management at this juncture.  She will contact her insurer and try to get things squared away from that end.  We suggested patient call and schedule surgery when this is possible.        The patient was seen with Dr. Neal today who agrees with the above assessment and plan.     I saw and evaluated the patient on the day of the visit and I formulated the plan.  Will Neal MD          Answers for HPI/ROS submitted by the patient on 10/6/2017   General Symptoms: Yes  Skin Symptoms: Yes  HENT Symptoms: No  EYE SYMPTOMS: No  HEART SYMPTOMS: Yes  LUNG SYMPTOMS: No  INTESTINAL SYMPTOMS: No  URINARY SYMPTOMS: No  GYNECOLOGIC SYMPTOMS: No  BREAST SYMPTOMS: No  SKELETAL SYMPTOMS: Yes  BLOOD SYMPTOMS: No  NERVOUS SYSTEM SYMPTOMS: No  MENTAL HEALTH SYMPTOMS: Yes  Fever: No  Loss of appetite: No  Weight loss: No  Weight gain: No  Fatigue: No  Night sweats: No  Chills: No  Increased stress: Yes  Excessive hunger: No  Excessive thirst: No  Feeling hot or cold when others believe the temperature is normal: Yes  Loss of height: No  Post-operative complications: No  Surgical site pain: No  Change in or Loss of Energy: Yes  Hyperactivity: No  Confusion: No  Changes in hair: No  Changes in moles/birth marks: No  Itching: No  Rashes: No  Changes in nails: No  Hair in places you don't want it: No  Change in facial hair: No  Warts: No  Non-healing sores: No  Scarring: No  Flaking of skin: No  Color changes of hands/feet in cold : No  Sun sensitivity: No  Skin thickening: No  Chest pain or pressure: No  Fast or irregular heartbeat: No  Pain in legs with walking: Yes  Swelling in feet or ankles: No  Trouble breathing while lying down: No  Fingers or Toes appear blue: No  High blood pressure: No  Low blood pressure: No  Fainting: No  Murmurs: No  Chest pain on exertion:  No  Chest pain at rest: No  Cramping pain in leg during exercise: No  Pacemaker: No  Varicose veins: No  Edema or swelling: No  Fast heart beat: No  Wake up at night with shortness of breath: No  Heart flutters: No  Light-headedness: Yes  Exercise intolerance: Yes  Back pain: Yes  Muscle aches: Yes  Neck pain: No  Swollen joints: Yes  Joint pain: Yes  Bone pain: Yes  Muscle cramps: No  Muscle weakness: No  Joint stiffness: Yes  Bone fracture: No  Nervous or Anxious: Yes  Depression: No  Trouble sleeping: Yes  Trouble thinking or concentrating: No  Mood changes: Yes  Panic attacks: No

## 2017-10-12 NOTE — MR AVS SNAPSHOT
"              After Visit Summary   10/12/2017    Inge Son    MRN: [id number]           Patient Information     Date Of Birth          1955        Visit Information        Provider Department      10/12/2017 10:45 AM Will Neal MD [facility]        Today's Diagnoses     Sacroiliitis (H)    -  1       Follow-ups after your visit        Who to contact     Please call your clinic at [phone] to:    Ask questions about your health    Make or cancel appointments    Discuss your medicines    Learn about your test results    Speak to your doctor   If you have compliments or concerns about an experience at your clinic, or if you wish to file a complaint, please contact [facility] Patient Relations at [phone] or email us at [email or web address]         Additional Information About Your Visit        MyChart Information     [employer] gives you secure access to your electronic health record. If you see a primary care provider, you can also send messages to your care team and make appointments. If you have questions, please call your primary care clinic.  If you do not have a primary care provider, please call [phone] and they will assist you.      TranscribeMe is an electronic gateway that provides easy, online access to your medical records. With TranscribeMe, you can request a clinic appointment, read your test results, renew a prescription or communicate with your care team.     To access your existing account, please contact your [facility] or call [phone] for assistance.        Care EveryWhere ID     This is your Care EveryWhere ID. This could be used by other organizations to access your [facility] medical records  [id number]        Your Vitals Were     Height BMI (Body Mass Index)                1.635 m (5' 4.37\") 22.64 kg/m2           Blood Pressure from Last 3 Encounters:   No data found for BP    Weight " from Last 3 Encounters:   No data found for Wt              Today, you had the following     No orders found for display       Primary Care Provider    None Specified       No primary provider on file.        Equal Access to Services     JOSE BARBA : Hadii aad ku haddebrajenny Betancur, cristinmiri estrellamatildaha, michael sarikaadalid fierro, amparo mascorroosiel tere. So [facility] [phone].    ATENCIÓN: Si habla español, tiene a mosley disposición servicios gratuitos de asistencia lingüística. Llame al [phone].    We comply with applicable federal civil rights laws and [place] laws. We do not discriminate on the basis of race, color, national origin, age, disability, sex, sexual orientation, or gender identity.            Thank you!     Thank you for choosing [facility]  for your care. Our goal is always to provide you with excellent care. Hearing back from our patients is one way we can continue to improve our services. Please take a few minutes to complete the written survey that you may receive in the mail after your visit with us. Thank you!             Your Updated Medication List - Protect others around you: Learn how to safely use, store and throw away your medicines at www.disposemymeds.org.          This list is accurate as of: 10/12/17 11:59 PM.  Always use your most recent med list.                   Brand Name Dispense Instructions for use Diagnosis    acetaminophen-codeine 300-30 MG per tablet    TYLENOL #3     1 tablet as needed        alendronate 70 MG tablet    FOSAMAX     once a week        amLODIPine 10 MG tablet    NORVASC     5 mg daily        BIOTIN 5000 5 MG Caps   Generic drug:  biotin           CALCIUM 600/VITAMIN D3 600-800 MG-UNIT Tabs   Generic drug:  Calcium Carb-Cholecalciferol           CENTRUM SILVER ADULT 50+ PO           hydrochlorothiazide 25 MG tablet    HYDRODIURIL          KLOR-CON 8 MEQ CR tablet   Generic drug:  potassium chloride      Take 8 mEq by mouth  daily        MAGNESIUM OXIDE PO      Take 400 mg by mouth daily        PROBIOTIC DAILY Caps           STOOL SOFTENER & LAXATIVE PO           SUPER B-COMPLEX Tabs           zinc 50 MG Tabs           zolpidem 5 MG tablet    AMBIEN

## 2017-10-12 NOTE — LETTER
10/12/2017       RE: Inge Son  [address] [place] [address]     Dear Colleague,    Thank you for referring your patient, Inge Son, to the [facility] at [facility]. Please see a copy of my visit note below.    REASON FOR VISIT:  Followup right sacroiliitis.       INTERVAL HISTORY:  This is Inge Son, 62-year-old female with a longstanding history of right SI joint area pain.  Her symptoms remain in her SI joint area on her right side which affect her on a daily basis.  She has difficulty even sitting on the right side because of her pain.  She is extremely limited in the amount of distance that she can walk and favors her right leg.  She feels that her right SI joint is unstable when she walks.  She has attempted many interventions to help relieve her pain.  Of note, she has been to therapy for at least fifteen sessions at 2 different sites to help her symptoms.  This minimally improved her symptoms only for a temporary period of time.  She also visits a chiropractor which provides only equivocal benefit.  She has tried interventions such as inversion tables and traction as well which show no improvement.  She has tried nerve stimulators w/ minimal to no relief.  She has tried nonsteroidal anti-inflammatory medications including Aleve which provide minimal and only temporary relief.  She has also tried an SI belt which does provide some relief while it is on, but does not eliminate her pain and she is still limited in spite of utilizing the belts.  She has also tried multiple injections in the right SI joint.  Most recently in 07/2017, she had a corticosteroid injection which she says relieved her pain for about 2 weeks.  Her pain relief was over 80% at that time.  She says she is able to do many of the things that she would like to do that she felt complete different.  Her pain symptoms have subsequently returned.  The patient returns  today for surgical discussion, given the fact that she has failed all nonoperative interventions at this juncture.  She would like to be considered for an SI joint fusion as had previously been discussed.      PHYSICAL EXAMINATION:  Well-appearing female in no acute distress.  She is pleasant in conversation with appropriate affect.   MUSCULOSKELETAL:  Patient has 5/5 strength in bilateral lower extremities.  She has some mild tenderness over the SI joint.  She is sensory intact in L2-S1.  Her 5/5 motor strength is L2-S1 as well.      IMAGING:  No new films available for review today.  Review of the CT-guided SI joint injection does demonstrate that the injection was indeed intra-articular into the SI joint from 07/2017.      ASSESSMENT:  Right sacroiliitis.      PLAN:  We had a long discussion today with Ms. Son regarding her diagnosis and potential options for management.  Management for the SI joint pain can include nonoperative modalities which she has exhausted.  Please see the interval history.  She has exhausted physical therapy, chiropractic work, nerve stimulators typical pain medications including nonsteroidals and Tylenol.  She has utilized an SI belt for a long period of time and has also seen relief with corticosteroid injections over 80 percent which is only temporary.  We discussed that surgically, we would recommend an SI joint fusion for her pain eminating from the SI joint.  The literature does support this extensively with 2 randomized controlled trials and several other studies.  For her part, Ms. Son has a [employer] plan which currently does not reimburse for sacroiliac joint fusions.  We have advised that the patient contact her insurance company, pursue getting this procedure  reimbursed.  We did discuss that we feel like that her SI joint fusion on the right could significantly improve her symptoms.  We discussed at length the risks and benefits of surgery.  We did discuss that with  surgery, we would hope for at least a 50% improvement in her Oswestry Disability Index, which today gives a 46%.  We did discuss that it is unlikely that her score would go to 0 and that she would be completely pain-free.  We would like to have her participate in her normal activities and live a more typical life.  We did again discuss at length the possible risks of surgery, which are detailed below.  The patient would like to pursue surgical management at this juncture.  She will contact her insurer and try to get things squared away from that end.  We suggested patient call and schedule surgery when this is possible.        The patient was seen with Dr. Neal today who agrees with the above assessment and plan.     I saw and evaluated the patient on the day of the visit and I formulated the plan.  Will Neal MD          Answers for HPI/ROS submitted by the patient on 10/6/2017   General Symptoms: Yes  Skin Symptoms: Yes  HENT Symptoms: No  EYE SYMPTOMS: No  HEART SYMPTOMS: Yes  LUNG SYMPTOMS: No  INTESTINAL SYMPTOMS: No  URINARY SYMPTOMS: No  GYNECOLOGIC SYMPTOMS: No  BREAST SYMPTOMS: No  SKELETAL SYMPTOMS: Yes  BLOOD SYMPTOMS: No  NERVOUS SYSTEM SYMPTOMS: No  MENTAL HEALTH SYMPTOMS: Yes  Fever: No  Loss of appetite: No  Weight loss: No  Weight gain: No  Fatigue: No  Night sweats: No  Chills: No  Increased stress: Yes  Excessive hunger: No  Excessive thirst: No  Feeling hot or cold when others believe the temperature is normal: Yes  Loss of height: No  Post-operative complications: No  Surgical site pain: No  Change in or Loss of Energy: Yes  Hyperactivity: No  Confusion: No  Changes in hair: No  Changes in moles/birth marks: No  Itching: No  Rashes: No  Changes in nails: No  Hair in places you don't want it: No  Change in facial hair: No  Warts: No  Non-healing sores: No  Scarring: No  Flaking of skin: No  Color changes of hands/feet in cold : No  Sun sensitivity: No  Skin thickening: No  Chest pain or  pressure: No  Fast or irregular heartbeat: No  Pain in legs with walking: Yes  Swelling in feet or ankles: No  Trouble breathing while lying down: No  Fingers or Toes appear blue: No  High blood pressure: No  Low blood pressure: No  Fainting: No  Murmurs: No  Chest pain on exertion: No  Chest pain at rest: No  Cramping pain in leg during exercise: No  Pacemaker: No  Varicose veins: No  Edema or swelling: No  Fast heart beat: No  Wake up at night with shortness of breath: No  Heart flutters: No  Light-headedness: Yes  Exercise intolerance: Yes  Back pain: Yes  Muscle aches: Yes  Neck pain: No  Swollen joints: Yes  Joint pain: Yes  Bone pain: Yes  Muscle cramps: No  Muscle weakness: No  Joint stiffness: Yes  Bone fracture: No  Nervous or Anxious: Yes  Depression: No  Trouble sleeping: Yes  Trouble thinking or concentrating: No  Mood changes: Yes  Panic attacks: No      Again, thank you for allowing me to participate in the care of your patient.      Sincerely,    Will Neal MD

## 2017-10-12 NOTE — NURSING NOTE
"Reason For Visit:   Chief Complaint   Patient presents with     RECHECK     right SI pain, s/p injection 7/24/17       Primary MD: Dr. Minerva Tobias  Ref. MD: same  Cardiologist: Dr. Gibson, [facility]    Occupation retired .  Date of injury: none    Date of surgery: 3/2013  Type of surgery: lumbar fusion at [facility], Dr. Pennington.  Smoker: No      Ht 1.635 m (5' 4.37\")  Wt 60.5 kg (133 lb 6.4 oz)  BMI 22.64 kg/m2    Pain Assessment  Patient Currently in Pain: Yes  0-10 Pain Scale: 6  Primary Pain Location: Other (Comment) (SI)  Pain Orientation: Right  Other Pain Locations: right leg  Pain Descriptors: Aching  Alleviating Factors: Other (comment) (lying flat)  Aggravating Factors: Walking, Sitting    Oswestry (CICI) Questionnaire    OSWESTRY DISABILITY INDEX 10/6/2017   Section 1 - Pain intensity 3   Section 2 - Personal care (washing, dressing, etc.)  1   Section 3 - Lifting 3   Section 4 - Walking 2   Section 5 - Sitting 3   Section 6 - Standing 3   Section 7 - Sleeping 2   Section 8 - Sex life (if applicable) 1   Section 9 - Social life 3   Section 10 - Traveling 2   Count 10   Sum 23   Oswestry Score (%) 46   SECTION 1-PAIN INTENSITY The pain is fairly severe at the moment.   SECTION 2-PERSONAL CARE (WASHING,DRESSING,ETC.) I can look after myself normally but it is very painful.   SECTION 3-LIFTING Pain prevents me from lifting heavy weights but I can manage light to medium weights if they are conveniently positioned.   SECTION 4-WALKING Pain prevents me from walking more than a quarter of a mile.   SECTION 5-SITTING Pain prevents me from sitting for more than half an hour.   SECTION 6-STANDING Pain prevents me from standing for more than half an hour.   SECTION 7-SLEEPING Because of pain I have less than 6 hours sleep.   SECTION 8-SEX LIFE (IF APPLICABLE) My sex life is normal but causes some additional pain.   SECTION 9-SOCIAL LIFE Pain has restricted my social life and I do not go out as often. "   SECTION 10-TRAVELING Pain is bad but I am able to manage trips over two hours.   Oswestry Disability Index: Count 10   CICI: Total Score = SUM (total for answered questions) 23   Computed Oswestry Score 46 (%)   Some recent data might be hidden                    Visual Analog Pain Scale  Back Pain Scale 0-10: 6  Right leg pain: 6  Left leg pain: 0    Promis 10 Assessment    PROMIS 10 10/6/2017   In general, would you say your health is: Fair   In general, would you say your quality of life is: Poor   In general, how would you rate your physical health? Fair   In general, how would you rate your mental health, including your mood and your ability to think? Good   In general, how would you rate your satisfaction with your social activities and relationships? Poor   In general, please rate how well you carry out your usual social activities and roles Poor   To what extent are you able to carry out your everyday physical activities such as walking, climbing stairs, carrying groceries, or moving a chair? A little   How often have you been bothered by emotional problems such as feeling anxious, depressed or irritable? Sometimes   How would you rate your fatigue on average? Mild   How would you rate your pain on average?   0 = No Pain  to  10 = Worst Imaginable Pain 7   Global Physical Health Score : Raw Score -   Global Mental Health Score : Raw Score -   Total (Physical + Mental Health Score) -   In general, would you say your health is: 2   In general, would you say your quality of life is: 1   In general, how would you rate your physical health? 2   In general, how would you rate your mental health, including your mood and your ability to think? 3   In general, how would you rate your satisfaction with your social activities and relationships? 1   In general, please rate how well you carry out your usual social activities and roles. (This includes activities at home, at work and in your community, and  responsibilities as a parent, child, spouse, employee, friend, etc.) 1   To what extent are you able to carry out your everyday physical activities such as walking, climbing stairs, carrying groceries, or moving a chair? 2   In the past 7 days, how often have you been bothered by emotional problems such as feeling anxious, depressed, or irritable? 3   In the past 7 days, how would you rate your fatigue on average? 2   In the past 7 days, how would you rate your pain on average, where 0 means no pain, and 10 means worst imaginable pain? 7   Global Mental Health Score 8   Global Physical Health Score 10   PROMIS TOTAL - SUBSCORES 18                Anni Barragan LPN

## 2017-10-18 DIAGNOSIS — M46.1 SACROILIITIS (H): Primary | ICD-10-CM

## 2017-10-20 ENCOUNTER — MEDICAL CORRESPONDENCE (OUTPATIENT)
Dept: HEALTH INFORMATION MANAGEMENT | Facility: CLINIC | Age: 62
End: 2017-10-20

## 2017-11-20 ENCOUNTER — TELEPHONE (OUTPATIENT)
Dept: ORTHOPEDICS | Facility: CLINIC | Age: 62
End: 2017-11-20

## 2017-11-20 ENCOUNTER — ANESTHESIA EVENT (OUTPATIENT)
Dept: SURGERY | Facility: CLINIC | Age: 62
End: 2017-11-20
Payer: COMMERCIAL

## 2017-11-20 NOTE — TELEPHONE ENCOUNTER
Called patient to schedule her Right Sacroiliac joint fusion with Dr. Neal. Patient has been scheduled for 8am on 12/12/17. Patient confirmed that she will be paying out of pocket, and has made arrangements with [employer] securing.     Per [employer] securing, they have what they need to allow the case to be scheduled.

## 2017-11-20 NOTE — TELEPHONE ENCOUNTER
Teaching Flowsheet   Relevant Diagnosis: Sacroiliitis  Teaching Topic: preop SacroIliitis     Person(s) involved in teaching:   Patient     Motivation Level:  Asks Questions: Yes  Eager to Learn: Yes  Cooperative: Yes  Receptive (willing/able to accept information): Yes  Any cultural factors/Roman Catholic beliefs that may influence understanding or compliance? No       Patient demonstrates understanding of the following:  Reason for the appointment, diagnosis and treatment plan: Yes  Knowledge of proper use of medications and conditions for which they are ordered (with special attention to potential side effects or drug interactions): Yes  Which situations necessitate calling provider and whom to contact: Yes       Teaching Concerns Addressed:        Proper use and care of meds. (medical equip, care aids, etc.): Yes  Nutritional needs and diet plan: Yes  Pain management techniques: Yes  Wound Care: Yes  How and/when to access community resources: Yes     Instructional Materials Used/Given: preop pkt       Time spent with patient: 15 minutes.

## 2017-11-22 ENCOUNTER — APPOINTMENT (OUTPATIENT)
Dept: SURGERY | Facility: CLINIC | Age: 62
End: 2017-11-22

## 2017-11-22 ENCOUNTER — OFFICE VISIT (OUTPATIENT)
Dept: SURGERY | Facility: CLINIC | Age: 62
End: 2017-11-22

## 2017-11-22 ENCOUNTER — ALLIED HEALTH/NURSE VISIT (OUTPATIENT)
Dept: SURGERY | Facility: CLINIC | Age: 62
End: 2017-11-22

## 2017-11-22 VITALS
OXYGEN SATURATION: 96 % | DIASTOLIC BLOOD PRESSURE: 78 MMHG | WEIGHT: 133.3 LBS | HEART RATE: 61 BPM | BODY MASS INDEX: 22.76 KG/M2 | RESPIRATION RATE: 16 BRPM | SYSTOLIC BLOOD PRESSURE: 157 MMHG | HEIGHT: 64 IN | TEMPERATURE: 97.5 F

## 2017-11-22 DIAGNOSIS — M46.1 SACROILIITIS (H): Primary | ICD-10-CM

## 2017-11-22 DIAGNOSIS — Z01.818 PREOP EXAMINATION: Primary | ICD-10-CM

## 2017-11-22 DIAGNOSIS — Z01.818 PREOP EXAMINATION: ICD-10-CM

## 2017-11-22 LAB
ANION GAP SERPL CALCULATED.3IONS-SCNC: 6 MMOL/L (ref 3–14)
BUN SERPL-MCNC: 19 MG/DL (ref 7–30)
CALCIUM SERPL-MCNC: 9.6 MG/DL (ref 8.5–10.1)
CHLORIDE SERPL-SCNC: 99 MMOL/L (ref 94–109)
CO2 SERPL-SCNC: 30 MMOL/L (ref 20–32)
CREAT SERPL-MCNC: 0.89 MG/DL (ref 0.52–1.04)
ERYTHROCYTE [DISTWIDTH] IN BLOOD BY AUTOMATED COUNT: 12 % (ref 10–15)
GFR SERPL CREATININE-BSD FRML MDRD: 64 ML/MIN/1.7M2
GLUCOSE SERPL-MCNC: 101 MG/DL (ref 70–99)
HCT VFR BLD AUTO: 43.4 % (ref 35–47)
HGB BLD-MCNC: 14.6 G/DL (ref 11.7–15.7)
INR PPP: 0.93 (ref 0.86–1.14)
MCH RBC QN AUTO: 30.5 PG (ref 26.5–33)
MCHC RBC AUTO-ENTMCNC: 33.6 G/DL (ref 31.5–36.5)
MCV RBC AUTO: 91 FL (ref 78–100)
PLATELET # BLD AUTO: 196 10E9/L (ref 150–450)
POTASSIUM SERPL-SCNC: 3.2 MMOL/L (ref 3.4–5.3)
RBC # BLD AUTO: 4.78 10E12/L (ref 3.8–5.2)
SODIUM SERPL-SCNC: 135 MMOL/L (ref 133–144)
WBC # BLD AUTO: 7.3 10E9/L (ref 4–11)

## 2017-11-22 ASSESSMENT — ENCOUNTER SYMPTOMS: ORTHOPNEA: 0

## 2017-11-22 ASSESSMENT — LIFESTYLE VARIABLES: TOBACCO_USE: 1

## 2017-11-22 NOTE — PATIENT INSTRUCTIONS
Preparing for Your Surgery      Name:  Inge Son   MRN:  [id number]   :  1955   Today's Date:  2017     Arriving for surgery:  Surgery date:  17  Arrival time:  06:00 a.m.  Please come to:     [facility] [address]  [address] [place]  [address]    - parking is available in front of [facility] from 5:15AM to 8:00PM. If you prefer, park your car in the Green Lot.    -Proceed to the 3rd floor, check in at the Adult Surgery Waiting Lounge. [phone]    If an escort is needed stop at the Information Desk in the lobby. Inform the information person that you are here for surgery. An escort to the Adult Surgery Waiting Lounge will be provided.        What can I eat or drink?  -  You may have solid food or milk products until 8 hours prior to your surgery midnight  -  You may have water, apple juice or 7up/Sprite until 2 hours prior to your surgery 06:00 a.m.    Which medicines can I take?  -  Do NOT take these medications in the morning, the day of surgery:    Hydrochlorothiazide, vitamins, minerals, or supplements    -  Please take these medications the day of surgery:          Amlodipine, Tylenol with codeine as needed      How do I prepare myself?  -  Take two showers: one the night before surgery; and one the morning of surgery.         Use Scrubcare or Hibiclens to wash from neck down.  You may use your own shampoo and conditioner. No other hair products.   -  Do NOT use lotion, powder, deodorant, or antiperspirant the day of your surgery.  -  Do NOT wear any makeup, fingernail polish or jewelry.  -  Do not bring your own medications to the hospital, except for inhalers and eye drops.  -  Bring your ID and insurance card.    Questions or Concerns:  If you have questions or concerns, please call the  Preoperative Assessment Center, Monday-Friday 7AM-7PM:  [phone]

## 2017-11-22 NOTE — ANESTHESIA PREPROCEDURE EVALUATION
Anesthesia Evaluation     . Pt has had prior anesthetic. Type: General and MAC    History of anesthetic complications   - PONV        ROS/MED HX    ENT/Pulmonary:     (+)tobacco use, Past use , . .   (-) recent URI   Neurologic: Comment: CVA, woke up during the night unable to move her right arm, couldn't hear in the left ear.  Leaning when got up to go to the bathroom.  Went back to sleep.  When she got up the following AM, symptoms had resolved, with the exception of the hearing loss.  Evaluated and it was eventually discovered that she had a small stroke.  Continues to have decreased hearing in the left ear.    (+)CVA date: 2007 with deficits- Hearing loss in the left ear.  ,    (-) TIA   Cardiovascular:     (+) hypertension-range: 118/70, ---. : . . . :. .      (-) taking anticoagulants/antiplatelets, DENNIS and orthopnea/PND   METS/Exercise Tolerance: Comment: Activity is limited by chronic back pain.   4 - Raking leaves, gardening   Hematologic:  - neg hematologic  ROS       Musculoskeletal:  - neg musculoskeletal ROS       GI/Hepatic: Comment: Occasional GERD symptoms, takes TUMS 1-2 times a week.      (+) GERD Other,       Renal/Genitourinary:  - ROS Renal section negative       Endo:  - neg endo ROS       Psychiatric: Comment: Anxiety regarding chronic pain and inability to do activities.      (+) psychiatric history anxiety      Infectious Disease:  - neg infectious disease ROS       Malignancy:   (+) Malignancy History of Skin  Skin CA Remission status post Surgery,         Other:    (+) H/O Chronic Pain,H/O chronic opiod use ,                    Physical Exam  Normal systems: dental    Airway   Mallampati: II  TM distance: <3 FB  Neck ROM: full    Dental     Cardiovascular   Rhythm and rate: regular and normal  (-) carotid bruit is not present, no peripheral edema and no murmur    Pulmonary    breath sounds clear to auscultation    Other findings: 11/22/17: WBC:  7.3; Hgb: 14.6; Hct: 43.4; Plt: 196; INR:  0.93  11/22/17: Na: 135; K: 3.2; Cl: 99; Glu: 101; BUN: 19; Cr: 0.89; Ca: 9.6    7/6/17 NM Cardiac Stress:  1.  There is no evidence for significant myocardial ischemia or infarction.  2.  Normal left ventricular ejection fraction of greater than 70 percent.             PAC Discussion and Assessment    ASA Classification: 2  Case is suitable for: [place]  Anesthetic techniques and relevant risks discussed: GA  Invasive monitoring and risk discussed: Yes  Types:   Possibility and Risk of blood transfusion discussed: No  NPO instructions given:   Additional anesthetic preparation and risks discussed:   Needs early admission to pre-op area:   Other:     PAC Resident/NP Anesthesia Assessment:  Inge Son is a 62 year old female scheduled to undergo Right Minimally Invasive Sacral Iliac Joint Fusion on 12/12/17 with Dr. Will Neal.    She has the following specific operative considerations:   1.  History of postoperative nausea/vomiting with HIGH risk for PONV: Recommend use of antiemetic agents in the perioperative period.    2.  HTN: Patient instructed to continue Norvasc as prescribed, but hold HCTZ the AM of surgery.  3.  Chronic pain: Recommend careful positioning throughout the perioperative period to prevent injury or exacerbation of pain.    4.  CBC, INR, BMP today.    Revised Cardiac Risk Index: 0.9% risk of major adverse cardiac event.  VTE risk: 0.5%  TOD risk: Low  PONV risk score= HIGH.  (If > 2, anti-emetic intervention is recommended.)  Anesthesia considerations: Refer to PAC assessment in the anesthesia records.      Reviewed and Signed by PAC Mid-Level Provider/Resident  Mid-Level Provider/Resident: Saira James CNP  Date: 11/22/17  Time:     Attending Anesthesiologist Anesthesia Assessment:        Anesthesiologist:   Date:   Time:   Pass/Fail:   Disposition:     PAC Pharmacist Assessment:        Pharmacist:   Date:   Time:      Anesthesia Plan      History & Physical Review  History and  physical reviewed and following examination; no interval change.    ASA Status:  2 .    NPO Status:  > 8 hours and > 2 hours    Plan for General and ETT with Intravenous and Propofol induction. Maintenance will be Balanced.    PONV prophylaxis:  Ondansetron (or other 5HT-3) and Dexamethasone or Solumedrol  Please use a background propofol gtt for h/o PONV. Pt with h/o GERD with bile production x once a week, use bicitra preop.  Reviewed GA risks.  Pt agrees with plan and wishes to proceed.      Postoperative Care  Postoperative pain management:  IV analgesics, Oral pain medications and Multi-modal analgesia.      Consents  Anesthetic plan, risks, benefits and alternatives discussed with:  Patient and Spouse..                          .

## 2017-11-22 NOTE — OR NURSING
"Inge was seen in PAC clinic for preop history and physical.  She has questions regarding her upcoming surgery.  She was under the impression that the PAC visit was going to go over instructions of what the surgery involved and what to expect after surgery.  She is carrying a consent for a SI joint \"IFuse\" consent and is wanting to drop it off.  Walked patient to Ortho and nurse Amada will talk with patient and her spouse.  "

## 2017-11-23 NOTE — H&P
Pre-Operative H & P     Date of Encounter: 11/22/2017  Primary Care Physician:  Era Tobias    CC: Chronic right sacroilitis.      HPI:  Inge Son is a 62 year old female who presents for pre-operative H & P in preparation for Right Minimally Invasive Sacral Iliac Joint Fusion on 12/12/17 with Dr. Will Neal at [facility].     The patient was first evaluated by Dr. Neal on 6/28/17 in regards to right sacroilitis.  Of significance, she had undergone L4-5 TLIF in 2013 which alleviated her chronic low back until early 2015.  She now has right-sided low back pain that restricts her activity.  She has had some injections which provided some relief.  At the 6/28 appointment, a diagnosis of right sacroilitis was made, and plans were made for a CT guided right SI injection.  She was reevaluated on 8/3, and reported good relief of her symptoms.  Plans were made to continue treatment with injections.    She was reevaluated on 10/12, and reported pain in her right SI joint area, making it difficult for her to sit on the right side, and limiting her ability to walk.  She also reported that she felt as if the joint is unstable when she walks.  It was determined that she has exhaused all nonoperative interventions, and would be an appropriate candidate for surgical intervention.  Arrangements are now being made for the above procedures.    History is obtained from the patient and the medical record.    Past Medical History:  Past Medical History:   Diagnosis Date     Chronic pain      Decreased hearing of left ear      GERD (gastroesophageal reflux disease)     Occasional symptoms, uses TUMS PRN.     H/O CVA (cerebral vascular accident) (H)     Residual hearing loss on the left.     History of skin cancer      HTN (hypertension)      Osteoporosis      PONV (postoperative nausea and vomiting)      Past Surgical History:  Past Surgical History:   Procedure Laterality Date      BUNIONECTOMY Bilateral      HYSTERECTOMY TOTAL ABDOMINAL, BILATERAL SALPINGO-OOPHORECTOMY, COMBINED       ORTHOPEDIC SURGERY      Cyst removed from right knee.     RELEASE TRIGGER FINGER Right      SPINE SURGERY  2013    L 4-5 TLIF     Hx of Blood transfusions/reactions: Denies.     Hx of abnormal bleeding or anti-platelet use: Denies.    Menstrual history: No LMP recorded.    Steroid use in the last year: Denies.    Personal or FH of difficulty with anesthesia: PONV.    Prior to admission medications  Current Outpatient Prescriptions   Medication Sig Dispense Refill     B Complex-Biotin-FA (SUPER B-COMPLEX) TABS Take 1 tablet by mouth every evening 1 tablet 0     MAGNESIUM OXIDE PO Take 500 mg by mouth every evening        potassium chloride (KLOR-CON) 8 MEQ CR tablet Take 8 mEq by mouth every evening        acetaminophen-codeine (TYLENOL #3) 300-30 MG per tablet 1 tablet as needed       alendronate (FOSAMAX) 70 MG tablet Take 70 mg by mouth once a week        amLODIPine (NORVASC) 10 MG tablet Take 5 mg by mouth every morning        biotin (BIOTIN 5000) 5 MG CAPS Take 1 capsule by mouth every evening        Calcium Carb-Cholecalciferol (CALCIUM 600/VITAMIN D3) 600-800 MG-UNIT TABS Take 1 tablet by mouth every evening        Multiple Vitamins-Minerals (CENTRUM SILVER ADULT 50+ PO) Take 1 tablet by mouth every evening        hydrochlorothiazide (HYDRODIURIL) 25 MG tablet Take 25 mg by mouth every morning        Probiotic Product (PROBIOTIC DAILY) CAPS Take 1 capsule by mouth every evening        Sennosides-Docusate Sodium (STOOL SOFTENER & LAXATIVE PO) Take 2 capsules by mouth 2 times daily        zinc 50 MG TABS Take 50 mg by mouth every evening        zolpidem (AMBIEN) 5 MG tablet Take 5 mg by mouth nightly as needed        cholecalciferol (D3 HIGH POTENCY) 2000 UNITS CAPS Take 2,000 Units by mouth every evening       Allergies  No Known Allergies    Social History  Social History     Social History     Marital  status:      Spouse name: N/A     Number of children: N/A     Years of education: N/A     Occupational History     Not on file.     Social History Main Topics     Smoking status: Former Smoker     Packs/day: 0.50     Years: 30.00     Types: Cigarettes     Quit date: 2013     Smokeless tobacco: Never Used     Alcohol use Yes      Comment: 2-3 drinks in a week.       Drug use: No     Sexual activity: Yes     Partners: Male     Birth control/ protection: Female Surgical     Other Topics Concern     Not on file     Social History Narrative     Family History  Family History   Problem Relation Age of Onset     Lung Cancer Mother      Coronary Artery Disease Mother      HEART DISEASE Father      Stomach Cancer Father      Survived     CEREBROVASCULAR DISEASE Father      Hypertension Father      KIDNEY DISEASE Father      Only one functioning kidney.     Polymyositis Sister      Lymphoma Brother      [place]     Hypertension Brother      Crohn Disease Sister      Coronary Artery Disease Brother      Review of Systems  Functional status: Independent in ADL's.  4 METS.     The complete review of systems is negative other than noted in the HPI or here.   Constitutional: Denies recent changes in weight, or fevers/chills.  Reports that her sleep is poor due to back pain.  Eyes: No recent vision changes.  EENT: Denies recent changes in hearing, mouth pain, or difficulty swallowing.  Cardiovascular: Denies chest pain, DENNIS or orthopnea, or palpitations.  Respiratory: Denies shortness of breath or significant cough.    GI: Denies nausea/vomiting or diarrhea.  Reports chronic issues with constipation.    : Denies dysuria.    Musculoskeletal: Denies joint pain or swelling, with the exception of chronic low back pain.    Skin: Denies rashes or wounds.    Hematologic: Denies easy bruising or bleeding.    Neurologic: Denies migraines, seizures, dizziness, numbness/tingling.  Psychiatric: Reports increased anxiety with her  "upcoming surgery.        /78  Pulse 61  Temp 97.5  F (36.4  C) (Oral)  Resp 16  Ht 1.626 m (5' 4\")  Wt 60.5 kg (133 lb 4.8 oz)  SpO2 96%  BMI 22.88 kg/m2    133 lbs 4.8 oz  5' 4\"   Body mass index is 22.88 kg/(m^2).    Physical Exam  Constitutional: Patient awake, seated upright in a chair, in no apparent distress.  Appears stated age.  Eyes: Pupils equal, round and reactive to light.  Extra ocular muscles intact. Sclera clear.  Conjunctiva normal.  HENT: Head normocephalic.  Oral pharynx intact with moist mucous membranes.  Dentition intact.  No thyromegaly appreciated.   Respiratory: Lung sounds clear to auscultation bilaterally.  No rales, rhonchi, or wheezing noted.    Cardiovascular: S1, S2, regular rate and rhythm.  No murmurs, rubs, or gallops noted. No carotid bruits auscultated.  Radial and pedal pulses palpable, bilaterally.  No edema noted.   GI: Bowel sounds present.  Abdomen rounded, soft, non-tender to light palpation.  No hepatosplenomegaly or masses palpated.   Genitourinary: Exam deferred.  Lymph/Hematologic: No cervical or supraclavicular lymphadenopathy noted.  No excessive bruising noted.    Skin: Color appropriate for race, warm, dry.  No rashes or wounds at anticipated surgical site.   Musculoskeletal: Slightly limited extension of the neck.  No redness, warmth, or swelling of the joints noted. Gross motor strength is normal.    Neurologic: Alert, oriented to name, place and time. Cranial nerves II-XII are grossly intact. Gait is normal.      Neuropsychiatric: Calm, cooperative. Normal affect.     Labs:  17: WBC:  7.3; Hgb: 14.6; Hct: 43.4; Plt: 196; INR: 0.93  17: Na: 135; K: 3.2; Cl: 99; Glu: 101; BUN: 19; Cr: 0.89; Ca: 9.6    Imagin/6/17 NM Cardiac Stress:  1.  There is no evidence for significant myocardial ischemia or infarction.  2.  Normal left ventricular ejection fraction of greater than 70 percent.      Lab results were personally reviewed by this " provider.      Outside records from [facility] reviewed.    Assessment and Plan  Inge Son is a 62 year old female scheduled to undergo Right Minimally Invasive Sacral Iliac Joint Fusion on 12/12/17 with Dr. Will Neal.    She has the following specific operative considerations:   1.  History of postoperative nausea/vomiting with HIGH risk for PONV: Recommend use of antiemetic agents in the perioperative period.    2.  HTN: Patient instructed to continue Norvasc as prescribed, but hold HCTZ the AM of surgery.  3.  Chronic pain: Recommend careful positioning throughout the perioperative period to prevent injury or exacerbation of pain.    4.  CBC, INR, BMP today.    Revised Cardiac Risk Index: 0.9% risk of major adverse cardiac event.  VTE risk: 0.5%  TOD risk: Low  PONV risk score= HIGH.  (If > 2, anti-emetic intervention is recommended.)  Anesthesia considerations: Refer to PAC assessment in the anesthesia records.    Patient was discussed with Dr. Dooley.    Saira James NP  Preoperative Assessment Center  [facility]  Phone: [phone]  Fax: [phone]

## 2017-12-12 ENCOUNTER — HOSPITAL ENCOUNTER (OUTPATIENT)
Facility: CLINIC | Age: 62
Discharge: HOME | End: 2017-12-12
Attending: ORTHOPAEDIC SURGERY | Admitting: ORTHOPAEDIC SURGERY
Payer: COMMERCIAL

## 2017-12-12 ENCOUNTER — ANESTHESIA (OUTPATIENT)
Dept: SURGERY | Facility: CLINIC | Age: 62
End: 2017-12-12
Payer: COMMERCIAL

## 2017-12-12 ENCOUNTER — APPOINTMENT (OUTPATIENT)
Dept: GENERAL RADIOLOGY | Facility: CLINIC | Age: 62
End: 2017-12-12
Attending: ORTHOPAEDIC SURGERY
Payer: COMMERCIAL

## 2017-12-12 VITALS
BODY MASS INDEX: 22.73 KG/M2 | TEMPERATURE: 96.8 F | HEIGHT: 64 IN | RESPIRATION RATE: 18 BRPM | WEIGHT: 133.16 LBS | OXYGEN SATURATION: 96 % | HEART RATE: 90 BPM | SYSTOLIC BLOOD PRESSURE: 117 MMHG | DIASTOLIC BLOOD PRESSURE: 68 MMHG

## 2017-12-12 LAB
GLUCOSE SERPL-MCNC: 105 MG/DL (ref 70–99)
POTASSIUM SERPL-SCNC: 4.2 MMOL/L (ref 3.4–5.3)

## 2017-12-12 PROCEDURE — 36000078 ZZH SURGERY LEVEL 6 W FLUORO 1ST 30 MIN - UMMC: Performed by: ORTHOPAEDIC SURGERY

## 2017-12-12 PROCEDURE — 27210794 ZZH OR GENERAL SUPPLY STERILE: Performed by: ORTHOPAEDIC SURGERY

## 2017-12-12 PROCEDURE — C9399 UNCLASSIFIED DRUGS OR BIOLOG: HCPCS | Performed by: NURSE ANESTHETIST, CERTIFIED REGISTERED

## 2017-12-12 PROCEDURE — 37000008 ZZH ANESTHESIA TECHNICAL FEE, 1ST 30 MIN: Performed by: ORTHOPAEDIC SURGERY

## 2017-12-12 PROCEDURE — 25000125 ZZHC RX 250: Performed by: ORTHOPAEDIC SURGERY

## 2017-12-12 PROCEDURE — 36415 COLL VENOUS BLD VENIPUNCTURE: CPT | Performed by: ANESTHESIOLOGY

## 2017-12-12 PROCEDURE — 71000015 ZZH RECOVERY PHASE 1 LEVEL 2 EA ADDTL HR: Performed by: ORTHOPAEDIC SURGERY

## 2017-12-12 PROCEDURE — 36000076 ZZH SURGERY LEVEL 6 EA 15 ADDTL MIN - UMMC: Performed by: ORTHOPAEDIC SURGERY

## 2017-12-12 PROCEDURE — 40000170 ZZH STATISTIC PRE-PROCEDURE ASSESSMENT II: Performed by: ORTHOPAEDIC SURGERY

## 2017-12-12 PROCEDURE — C1769 GUIDE WIRE: HCPCS | Performed by: ORTHOPAEDIC SURGERY

## 2017-12-12 PROCEDURE — 25000128 H RX IP 250 OP 636: Performed by: NURSE ANESTHETIST, CERTIFIED REGISTERED

## 2017-12-12 PROCEDURE — 40000278 XR SURGERY CARM FLUORO LESS THAN 5 MIN: Mod: TC

## 2017-12-12 PROCEDURE — 37000009 ZZH ANESTHESIA TECHNICAL FEE, EACH ADDTL 15 MIN: Performed by: ORTHOPAEDIC SURGERY

## 2017-12-12 PROCEDURE — 25000128 H RX IP 250 OP 636: Performed by: ORTHOPAEDIC SURGERY

## 2017-12-12 PROCEDURE — C9290 INJ, BUPIVACAINE LIPOSOME: HCPCS | Performed by: ANESTHESIOLOGY

## 2017-12-12 PROCEDURE — C1776 JOINT DEVICE (IMPLANTABLE): HCPCS | Performed by: ORTHOPAEDIC SURGERY

## 2017-12-12 PROCEDURE — 25000301 ZZH OR RX SURGIFLO W/THROMBIN KIT 2ML 1991 OPNP: Performed by: ORTHOPAEDIC SURGERY

## 2017-12-12 PROCEDURE — C1713 ANCHOR/SCREW BN/BN,TIS/BN: HCPCS | Performed by: ORTHOPAEDIC SURGERY

## 2017-12-12 PROCEDURE — 27210995 ZZH RX 272: Performed by: ORTHOPAEDIC SURGERY

## 2017-12-12 PROCEDURE — 25000566 ZZH SEVOFLURANE, EA 15 MIN: Performed by: ORTHOPAEDIC SURGERY

## 2017-12-12 PROCEDURE — 25000132 ZZH RX MED GY IP 250 OP 250 PS 637: Performed by: ORTHOPAEDIC SURGERY

## 2017-12-12 PROCEDURE — 71000027 ZZH RECOVERY PHASE 2 EACH 15 MINS: Performed by: ORTHOPAEDIC SURGERY

## 2017-12-12 PROCEDURE — 25000128 H RX IP 250 OP 636: Performed by: ANESTHESIOLOGY

## 2017-12-12 PROCEDURE — 84132 ASSAY OF SERUM POTASSIUM: CPT | Performed by: ANESTHESIOLOGY

## 2017-12-12 PROCEDURE — 71000014 ZZH RECOVERY PHASE 1 LEVEL 2 FIRST HR: Performed by: ORTHOPAEDIC SURGERY

## 2017-12-12 PROCEDURE — 25000125 ZZHC RX 250: Performed by: NURSE ANESTHETIST, CERTIFIED REGISTERED

## 2017-12-12 PROCEDURE — 25000132 ZZH RX MED GY IP 250 OP 250 PS 637: Performed by: ANESTHESIOLOGY

## 2017-12-12 PROCEDURE — 82947 ASSAY GLUCOSE BLOOD QUANT: CPT | Performed by: ANESTHESIOLOGY

## 2017-12-12 DEVICE — IMPLANTABLE DEVICE: Type: IMPLANTABLE DEVICE | Site: SPINE LUMBAR | Status: FUNCTIONAL

## 2017-12-12 RX ORDER — ONDANSETRON 4 MG/1
4 TABLET, ORALLY DISINTEGRATING ORAL EVERY 30 MIN PRN
Status: DISCONTINUED | OUTPATIENT
Start: 2017-12-12 | End: 2017-12-12 | Stop reason: HOSPADM

## 2017-12-12 RX ORDER — OXYCODONE HYDROCHLORIDE 5 MG/1
5 TABLET ORAL
Status: COMPLETED | OUTPATIENT
Start: 2017-12-12 | End: 2017-12-12

## 2017-12-12 RX ORDER — FENTANYL CITRATE 50 UG/ML
25-50 INJECTION, SOLUTION INTRAMUSCULAR; INTRAVENOUS
Status: DISCONTINUED | OUTPATIENT
Start: 2017-12-12 | End: 2017-12-12 | Stop reason: HOSPADM

## 2017-12-12 RX ORDER — ACETAMINOPHEN 325 MG/1
650 TABLET ORAL EVERY 4 HOURS PRN
Qty: 100 TABLET | Refills: 0 | Status: SHIPPED | OUTPATIENT
Start: 2017-12-12 | End: 2019-04-10

## 2017-12-12 RX ORDER — AMOXICILLIN 250 MG
1-2 CAPSULE ORAL 2 TIMES DAILY
Qty: 30 TABLET | Refills: 0 | Status: ON HOLD | OUTPATIENT
Start: 2017-12-12 | End: 2019-04-26

## 2017-12-12 RX ORDER — FENTANYL CITRATE 50 UG/ML
INJECTION, SOLUTION INTRAMUSCULAR; INTRAVENOUS PRN
Status: DISCONTINUED | OUTPATIENT
Start: 2017-12-12 | End: 2017-12-12

## 2017-12-12 RX ORDER — CEFAZOLIN SODIUM 1 G/3ML
1 INJECTION, POWDER, FOR SOLUTION INTRAMUSCULAR; INTRAVENOUS SEE ADMIN INSTRUCTIONS
Status: DISCONTINUED | OUTPATIENT
Start: 2017-12-12 | End: 2017-12-12 | Stop reason: HOSPADM

## 2017-12-12 RX ORDER — CEFAZOLIN SODIUM 2 G/100ML
2 INJECTION, SOLUTION INTRAVENOUS
Status: COMPLETED | OUTPATIENT
Start: 2017-12-12 | End: 2017-12-12

## 2017-12-12 RX ORDER — MAGNESIUM HYDROXIDE 1200 MG/15ML
LIQUID ORAL PRN
Status: DISCONTINUED | OUTPATIENT
Start: 2017-12-12 | End: 2017-12-12 | Stop reason: HOSPADM

## 2017-12-12 RX ORDER — NALOXONE HYDROCHLORIDE 0.4 MG/ML
.1-.4 INJECTION, SOLUTION INTRAMUSCULAR; INTRAVENOUS; SUBCUTANEOUS
Status: DISCONTINUED | OUTPATIENT
Start: 2017-12-12 | End: 2017-12-12 | Stop reason: HOSPADM

## 2017-12-12 RX ORDER — METOCLOPRAMIDE HYDROCHLORIDE 5 MG/ML
INJECTION INTRAMUSCULAR; INTRAVENOUS PRN
Status: DISCONTINUED | OUTPATIENT
Start: 2017-12-12 | End: 2017-12-12

## 2017-12-12 RX ORDER — LIDOCAINE 40 MG/G
CREAM TOPICAL
Status: DISCONTINUED | OUTPATIENT
Start: 2017-12-12 | End: 2017-12-12 | Stop reason: HOSPADM

## 2017-12-12 RX ORDER — OXYCODONE HYDROCHLORIDE 5 MG/1
5 TABLET ORAL EVERY 4 HOURS PRN
Status: DISCONTINUED | OUTPATIENT
Start: 2017-12-12 | End: 2017-12-12 | Stop reason: HOSPADM

## 2017-12-12 RX ORDER — PROPOFOL 10 MG/ML
INJECTION, EMULSION INTRAVENOUS PRN
Status: DISCONTINUED | OUTPATIENT
Start: 2017-12-12 | End: 2017-12-12

## 2017-12-12 RX ORDER — SODIUM CHLORIDE, SODIUM LACTATE, POTASSIUM CHLORIDE, CALCIUM CHLORIDE 600; 310; 30; 20 MG/100ML; MG/100ML; MG/100ML; MG/100ML
INJECTION, SOLUTION INTRAVENOUS CONTINUOUS
Status: DISCONTINUED | OUTPATIENT
Start: 2017-12-12 | End: 2017-12-12 | Stop reason: HOSPADM

## 2017-12-12 RX ORDER — MEPERIDINE HYDROCHLORIDE 25 MG/ML
12.5 INJECTION INTRAMUSCULAR; INTRAVENOUS; SUBCUTANEOUS
Status: DISCONTINUED | OUTPATIENT
Start: 2017-12-12 | End: 2017-12-12 | Stop reason: HOSPADM

## 2017-12-12 RX ORDER — OXYCODONE HYDROCHLORIDE 5 MG/1
5-10 TABLET ORAL
Qty: 60 TABLET | Refills: 0 | Status: SHIPPED | OUTPATIENT
Start: 2017-12-12 | End: 2019-02-21

## 2017-12-12 RX ORDER — ONDANSETRON 2 MG/ML
4 INJECTION INTRAMUSCULAR; INTRAVENOUS EVERY 30 MIN PRN
Status: DISCONTINUED | OUTPATIENT
Start: 2017-12-12 | End: 2017-12-12 | Stop reason: HOSPADM

## 2017-12-12 RX ORDER — ACETAMINOPHEN 325 MG/1
650 TABLET ORAL
Status: DISCONTINUED | OUTPATIENT
Start: 2017-12-12 | End: 2017-12-12 | Stop reason: HOSPADM

## 2017-12-12 RX ORDER — DEXAMETHASONE SODIUM PHOSPHATE 4 MG/ML
INJECTION, SOLUTION INTRA-ARTICULAR; INTRALESIONAL; INTRAMUSCULAR; INTRAVENOUS; SOFT TISSUE PRN
Status: DISCONTINUED | OUTPATIENT
Start: 2017-12-12 | End: 2017-12-12

## 2017-12-12 RX ORDER — ONDANSETRON 4 MG/1
4 TABLET, ORALLY DISINTEGRATING ORAL
Status: DISCONTINUED | OUTPATIENT
Start: 2017-12-12 | End: 2017-12-12 | Stop reason: HOSPADM

## 2017-12-12 RX ORDER — SODIUM CHLORIDE, SODIUM LACTATE, POTASSIUM CHLORIDE, CALCIUM CHLORIDE 600; 310; 30; 20 MG/100ML; MG/100ML; MG/100ML; MG/100ML
INJECTION, SOLUTION INTRAVENOUS CONTINUOUS PRN
Status: DISCONTINUED | OUTPATIENT
Start: 2017-12-12 | End: 2017-12-12

## 2017-12-12 RX ORDER — EPHEDRINE SULFATE 50 MG/ML
INJECTION, SOLUTION INTRAMUSCULAR; INTRAVENOUS; SUBCUTANEOUS PRN
Status: DISCONTINUED | OUTPATIENT
Start: 2017-12-12 | End: 2017-12-12

## 2017-12-12 RX ORDER — ONDANSETRON 2 MG/ML
INJECTION INTRAMUSCULAR; INTRAVENOUS PRN
Status: DISCONTINUED | OUTPATIENT
Start: 2017-12-12 | End: 2017-12-12

## 2017-12-12 RX ORDER — BUPIVACAINE HYDROCHLORIDE AND EPINEPHRINE 2.5; 5 MG/ML; UG/ML
INJECTION, SOLUTION INFILTRATION; PERINEURAL PRN
Status: DISCONTINUED | OUTPATIENT
Start: 2017-12-12 | End: 2017-12-12 | Stop reason: HOSPADM

## 2017-12-12 RX ORDER — PROPOFOL 10 MG/ML
INJECTION, EMULSION INTRAVENOUS CONTINUOUS PRN
Status: DISCONTINUED | OUTPATIENT
Start: 2017-12-12 | End: 2017-12-12

## 2017-12-12 RX ORDER — METOPROLOL TARTRATE 1 MG/ML
1-2 INJECTION, SOLUTION INTRAVENOUS EVERY 5 MIN PRN
Status: DISCONTINUED | OUTPATIENT
Start: 2017-12-12 | End: 2017-12-12 | Stop reason: HOSPADM

## 2017-12-12 RX ORDER — LIDOCAINE HYDROCHLORIDE 20 MG/ML
INJECTION, SOLUTION INFILTRATION; PERINEURAL PRN
Status: DISCONTINUED | OUTPATIENT
Start: 2017-12-12 | End: 2017-12-12

## 2017-12-12 RX ADMIN — FENTANYL CITRATE 50 MCG: 50 INJECTION, SOLUTION INTRAMUSCULAR; INTRAVENOUS at 08:20

## 2017-12-12 RX ADMIN — SUGAMMADEX 120 MG: 100 INJECTION, SOLUTION INTRAVENOUS at 09:42

## 2017-12-12 RX ADMIN — MIDAZOLAM 2 MG: 1 INJECTION INTRAMUSCULAR; INTRAVENOUS at 08:10

## 2017-12-12 RX ADMIN — OXYCODONE HYDROCHLORIDE 5 MG: 5 TABLET ORAL at 11:12

## 2017-12-12 RX ADMIN — LIDOCAINE HYDROCHLORIDE 20 MG: 20 INJECTION, SOLUTION INFILTRATION; PERINEURAL at 08:20

## 2017-12-12 RX ADMIN — SODIUM CHLORIDE, POTASSIUM CHLORIDE, SODIUM LACTATE AND CALCIUM CHLORIDE: 600; 310; 30; 20 INJECTION, SOLUTION INTRAVENOUS at 08:05

## 2017-12-12 RX ADMIN — PROPOFOL 150 MG: 10 INJECTION, EMULSION INTRAVENOUS at 08:20

## 2017-12-12 RX ADMIN — Medication 20 MG: at 08:20

## 2017-12-12 RX ADMIN — OXYCODONE HYDROCHLORIDE 5 MG: 5 TABLET ORAL at 13:05

## 2017-12-12 RX ADMIN — METOCLOPRAMIDE HYDROCHLORIDE 10 MG: 5 INJECTION INTRAMUSCULAR; INTRAVENOUS at 09:25

## 2017-12-12 RX ADMIN — Medication 30 MG: at 08:47

## 2017-12-12 RX ADMIN — FENTANYL CITRATE 50 MCG: 50 INJECTION, SOLUTION INTRAMUSCULAR; INTRAVENOUS at 09:53

## 2017-12-12 RX ADMIN — HYDROMORPHONE HYDROCHLORIDE 0.3 MG: 1 INJECTION, SOLUTION INTRAMUSCULAR; INTRAVENOUS; SUBCUTANEOUS at 10:24

## 2017-12-12 RX ADMIN — ONDANSETRON 4 MG: 2 INJECTION INTRAMUSCULAR; INTRAVENOUS at 09:29

## 2017-12-12 RX ADMIN — HYDROMORPHONE HYDROCHLORIDE 0.3 MG: 1 INJECTION, SOLUTION INTRAMUSCULAR; INTRAVENOUS; SUBCUTANEOUS at 10:49

## 2017-12-12 RX ADMIN — HYDROMORPHONE HYDROCHLORIDE 0.4 MG: 1 INJECTION, SOLUTION INTRAMUSCULAR; INTRAVENOUS; SUBCUTANEOUS at 10:32

## 2017-12-12 RX ADMIN — ONDANSETRON 4 MG: 2 INJECTION INTRAMUSCULAR; INTRAVENOUS at 11:28

## 2017-12-12 RX ADMIN — DEXAMETHASONE SODIUM PHOSPHATE 6 MG: 4 INJECTION, SOLUTION INTRAMUSCULAR; INTRAVENOUS at 08:41

## 2017-12-12 RX ADMIN — Medication 5 MG: at 09:06

## 2017-12-12 RX ADMIN — PROPOFOL 20 MCG/KG/MIN: 10 INJECTION, EMULSION INTRAVENOUS at 08:35

## 2017-12-12 RX ADMIN — CEFAZOLIN SODIUM 2 G: 2 INJECTION, SOLUTION INTRAVENOUS at 08:30

## 2017-12-12 ASSESSMENT — PAIN DESCRIPTION - DESCRIPTORS
DESCRIPTORS: ACHING

## 2017-12-12 NOTE — ANESTHESIA POSTPROCEDURE EVALUATION
Patient: Inge Son    Procedure(s):  Right Minimally Invasive Sacral Iliac Joint Fusion  - Wound Class: I-Clean    Diagnosis:Sacroilitis Right side   Diagnosis Additional Information: No value filed.    Anesthesia Type:  General, ETT    Note:  Anesthesia Post Evaluation    Patient location during evaluation: PACU  Patient participation: Able to fully participate in evaluation  Level of consciousness: awake and alert  Pain management: adequate  Airway patency: patent  Cardiovascular status: acceptable  Respiratory status: acceptable  Hydration status: balanced  PONV: none     Anesthetic complications: None    Comments: No issues at discharge.        Last vitals:  Vitals:    12/12/17 1205 12/12/17 1230 12/12/17 1300   BP: 131/71 109/67 125/70   Pulse:      Resp: 14 16 16   Temp:      SpO2: 93% 94% 95%         Electronically Signed By: Smiley Reaves MD  December 12, 2017  1:27 PM

## 2017-12-12 NOTE — ANESTHESIA CARE TRANSFER NOTE
Patient: Inge Son    Procedure(s):  Right Minimally Invasive Sacral Iliac Joint Fusion  - Wound Class: I-Clean    Diagnosis: Sacroilitis Right side   Diagnosis Additional Information: No value filed.    Anesthesia Type:   General, ETT     Note:  Airway :Face Mask  Patient transferred to:PACU  Handoff Report: Identifed the Patient, Identified the Reponsible Provider, Reviewed the pertinent medical history, Discussed the surgical course, Reviewed Intra-OP anesthesia mangement and issues during anesthesia, Set expectations for post-procedure period and Allowed opportunity for questions and acknowledgement of understanding      Vitals: (Last set prior to Anesthesia Care Transfer)    CRNA VITALS  12/12/2017 0940 - 12/12/2017 1016      12/12/2017             Pulse: 98    SpO2: 99 %    Resp Rate (observed): (!)  3                Electronically Signed By: JORDI Jiang CRNA  December 12, 2017  10:16 AM

## 2017-12-12 NOTE — IP AVS SNAPSHOT
MRN:[id number]                      After Visit Summary   12/12/2017    Inge Son    MRN: [id number]           Thank you!     Thank you for choosing [facility] for your care. Our goal is always to provide you with excellent care. Hearing back from our patients is one way we can continue to improve our services. Please take a few minutes to complete the written survey that you may receive in the mail after you visit with us. Thank you!        Patient Information     Date Of Birth          1955        About your hospital stay     You were admitted on:  December 12, 2017 You last received care in the:   PACU    You were discharged on:  December 12, 2017       Who to Call     For medical emergencies, please call 911.  For non-urgent questions about your medical care, please call your primary care provider or clinic, [phone]  For questions related to your surgery, please call your surgery clinic        Attending Provider     Provider Specialty    Will Neal MD Orthopedics       Primary Care Provider Office Phone # Fax #    Era Tobias [phone]      After Care Instructions     Discharge Instructions       Review outpatient procedure discharge instructions as directed by Provider.            Discharge Instructions       Postoperative Instructions- Sacroiliac joint fusion        [place] [address]     You have had a sacroiliac joint fusion. No dressing is needed to cover this incision once you leave the hospital. It is okay to shower and wash gently with soap and water. Do not soak in the bath. No pools, hot tubs, or lakes for 6 weeks.     For postoperative pain control, I have prescribed a narcotic medication.  This should be taken for the first few weeks following surgery, but as soon as you are able, transition to an over-the-counter type medication such as Tylenol.  You may not take  non-steroidal anti-inflammatory medications (eg: Advil, Ibuprofen, Aleve, others) for the first 3 months after surgery as it interferes with bone healing. While taking the narcotic medication, there are several precautions that you must adhere to. These medications have numerous side effects including nausea, constipation, and drowsiness. If you experience nausea, this may be relieved by taking the medication with food or a light meal. To avoid constipation, please use an over-the-counter stool softener or drink lots of water and eat fruits and vegetables. Avoid operating heavy machinery or driving an automobile while on narcotic medications.      For the next 3 weeks, you will be toe touch weight bearing only on your affected side. You will use crutches to get around. Three weeks after your surgery will be your first follow up appointment. At that appointment, we will check your wound and have you return to physical therapy to have you begin weaning off walking with crutches. Avoid bending, lifting, and twisting. Your weight lifting restriction is 10 pounds until your first follow-up appointment.      When you get home, you may resume your normal diet as tolerated. You may not be very hungry but try to eat small healthy meals to help you heal. Remember to drink plenty of water/fluids to help keep you hydrated.    After surgery, you may have a sensitive scar.  When the dressing has been removed, you may massage the scar to decrease sensitivity and help break down scar tissue. Do this up to 4 times per day.    Please call or return if you experience the following:  Fevers (temperature greater than 100.4 degrees Fahrenheit)  Pain that is getting worse or does not respond to pain medications  Drainage from your wound  Increasing redness about the wound  Any other worrisome symptoms    You may reach the clinic by dialing [phone].  You may also be seen at our Orthopaedic Walk-In clinic that runs 7 days per week from  7a-7p at [address] [place] [address]. After hours, FOR URGENT PROBLEMS ONLY, you may reach the resident on call by dialing [phone].            Return to Clinic       Follow up with Dr. Neal in 6 weeks.    Appointments on [address] [place] [address] Orthopaedic Surgery Clinic. Call [phone] if you haven't heard regarding these appointments within 7 days of discharge.                  Your next 10 appointments already scheduled     Jan 25, 2018 11:45 AM CST   (Arrive by 11:15 AM)   RETURN SPINE with Will Neal MD   [facility] ([facility])    [address] [place] [address]  4th [facility] [address] [phone]            Mar 01, 2018 10:45 AM CST   (Arrive by 10:15 AM)   RETURN SPINE with Will Neal MD   [facility] ([facility])    [address] [place] [address] [phone]              Further instructions from your care team       [facility]  Same-Day Surgery   Adult Discharge Orders & Instructions     For 24 hours after surgery    1. Get plenty of rest.  A responsible adult must stay with you for at least 24 hours after you leave the hospital.   2. Do not drive or use heavy equipment.  If you have weakness or tingling, don't drive or use heavy equipment until this feeling goes away.  3. Do not drink alcohol.  4. Avoid strenuous or risky activities.  Ask for help when climbing stairs.   5. You may feel lightheaded.  IF so, sit for a few minutes before standing.  Have someone help you get up.   6. If you have nausea (feel sick to your stomach): Drink only clear liquids such as apple juice, ginger ale, broth or 7-Up.  Rest may also help.  Be sure to drink enough fluids.  Move to a regular diet as you feel able.  7. You may have a slight fever. Call the doctor if your fever is over 100 F (37.7 C) (taken under the tongue) or lasts longer than 24 hours.  8. You  "may have a dry mouth, a sore throat, muscle aches or trouble sleeping.  These should go away after 24 hours.  9. Do not make important or legal decisions.   Call your doctor for any of the followin.  Signs of infection (fever, growing tenderness at the surgery site, a large amount of drainage or bleeding, severe pain, foul-smelling drainage, redness, swelling).    2. It has been over 8 to 10 hours since surgery and you are still not able to urinate (pass water).    3.  Headache for over 24 hours.    4.  Numbness, tingling or weakness the day after surgery (if you had spinal anesthesia).  To contact a doctor, call ________________________________________ or:        [phone] and ask for the resident on call for   ______________________________________________ (answered 24 hours a day)      Emergency Department:    [facility] [place] [place]: [phone]       (TTY for hearing impaired: [phone])    [facility]: [phone]       (TTY for hearing impaired: [phone])          Additional Information     If you use hormonal birth control (such as the pill, patch, ring or implants): You'll need a second form of birth control for 7 days (condoms, a diaphragm or contraceptive foam). While in the hospital, you received a medicine called Bridion. Your normal birth control will not work as well for a week after taking this medicine.          Pending Results     No orders found from 12/10/2017 to 2017.            Admission Information     Date & Time Provider Department Dept. Phone    2017 Will Neal MD  PACU [phone]      Your Vitals Were     Blood Pressure Pulse Temperature Respirations Height Weight    124/79 90 96.8  F (36  C) (Axillary) 12 1.626 m (5' 4\") 60.4 kg (133 lb 2.5 oz)    Pulse Oximetry BMI (Body Mass Index)                91% 22.86 kg/m2          MyChart Information     [employer] gives you secure access to your electronic health record. If you see a primary care provider, " you can also send messages to your care team and make appointments. If you have questions, please call your primary care clinic.  If you do not have a primary care provider, please call [phone] and they will assist you.        Care EveryWhere ID     This is your Care EveryWhere ID. This could be used by other organizations to access your [facility] medical records  [id number]        Equal Access to Services     JOSE BARBA : Hadii soila cain hadasho Soabbyali, waaxda luqadaha, qaybta kaalmada adecarringtonbrandyda, amparo ordoñezabelten carranza. So [facility] [phone].    ATENCIÓN: Si habla español, tiene a mosley disposición servicios gratuitos de asistencia lingüística. Roni al [phone].    We comply with applicable federal civil rights laws and [place] laws. We do not discriminate on the basis of race, color, national origin, age, disability, sex, sexual orientation, or gender identity.               Review of your medicines      START taking        Dose / Directions    acetaminophen 325 MG tablet   Commonly known as:  TYLENOL        Dose:  650 mg   Take 2 tablets (650 mg) by mouth every 4 hours as needed for other (mild pain)   Quantity:  100 tablet   Refills:  0       oxyCODONE IR 5 MG tablet   Commonly known as:  ROXICODONE        Dose:  5-10 mg   Take 1-2 tablets (5-10 mg) by mouth every 3 hours as needed for pain or other (Moderate to Severe)   Quantity:  60 tablet   Refills:  0         CONTINUE these medicines which may have CHANGED, or have new prescriptions. If we are uncertain of the size of tablets/capsules you have at home, strength may be listed as something that might have changed.        Dose / Directions    * STOOL SOFTENER & LAXATIVE PO   This may have changed:  Another medication with the same name was added. Make sure you understand how and when to take each.        Dose:  2 capsule   Take 2 capsules by mouth 2 times daily   Refills:  0       * senna-docusate 8.6-50 MG per tablet   Commonly  known as:  SENOKOT-S;PERICOLACE   This may have changed:  You were already taking a medication with the same name, and this prescription was added. Make sure you understand how and when to take each.        Dose:  1-2 tablet   Take 1-2 tablets by mouth 2 times daily Take while on oral narcotics to prevent or treat constipation.   Quantity:  30 tablet   Refills:  0       * Notice:  This list has 2 medication(s) that are the same as other medications prescribed for you. Read the directions carefully, and ask your doctor or other care provider to review them with you.      CONTINUE these medicines which have NOT CHANGED        Dose / Directions    acetaminophen-codeine 300-30 MG per tablet   Commonly known as:  TYLENOL #3        Dose:  1 tablet   1 tablet as needed   Refills:  0       alendronate 70 MG tablet   Commonly known as:  FOSAMAX        Dose:  70 mg   Take 70 mg by mouth once a week   Refills:  0       amLODIPine 10 MG tablet   Commonly known as:  NORVASC        Dose:  5 mg   Take 5 mg by mouth every morning   Refills:  0       BIOTIN 5000 5 MG Caps   Generic drug:  biotin        Dose:  1 capsule   Take 1 capsule by mouth every evening   Refills:  0       CALCIUM 600/VITAMIN D3 600-800 MG-UNIT Tabs   Generic drug:  Calcium Carb-Cholecalciferol        Dose:  1 tablet   Take 1 tablet by mouth every evening   Refills:  0       CENTRUM SILVER ADULT 50+ PO        Dose:  1 tablet   Take 1 tablet by mouth every evening   Refills:  0       D3 HIGH POTENCY 2000 UNITS Caps   Generic drug:  cholecalciferol        Dose:  2000 Units   Take 2,000 Units by mouth every evening   Refills:  0       hydrochlorothiazide 25 MG tablet   Commonly known as:  HYDRODIURIL        Dose:  25 mg   Take 25 mg by mouth every morning   Refills:  0       KLOR-CON 8 MEQ CR tablet   Generic drug:  potassium chloride        Dose:  8 mEq   Take 8 mEq by mouth every evening   Refills:  0       MAGNESIUM OXIDE PO        Dose:  500 mg   Take 500 mg  by mouth every evening   Refills:  0       PROBIOTIC DAILY Caps        Dose:  1 capsule   Take 1 capsule by mouth every evening   Refills:  0       SUPER B-COMPLEX Tabs        Dose:  1 tablet   Take 1 tablet by mouth every evening   Quantity:  1 tablet   Refills:  0       zinc 50 MG Tabs        Dose:  50 mg   Take 50 mg by mouth every evening   Refills:  0       zolpidem 5 MG tablet   Commonly known as:  AMBIEN        Dose:  5 mg   Take 5 mg by mouth nightly as needed   Refills:  0            Where to get your medicines      These medications were sent to [facility], [place] - [address]  [address] [facility] [address], [facility] [address]     Phone:  [phone]     acetaminophen 325 MG tablet    senna-docusate 8.6-50 MG per tablet         Some of these will need a paper prescription and others can be bought over the counter. Ask your nurse if you have questions.     Bring a paper prescription for each of these medications     oxyCODONE IR 5 MG tablet                Protect others around you: Learn how to safely use, store and throw away your medicines at www.disposemymeds.org.             Medication List: This is a list of all your medications and when to take them. Check marks below indicate your daily home schedule. Keep this list as a reference.      Medications           Morning Afternoon Evening Bedtime As Needed    acetaminophen 325 MG tablet   Commonly known as:  TYLENOL   Take 2 tablets (650 mg) by mouth every 4 hours as needed for other (mild pain)                                acetaminophen-codeine 300-30 MG per tablet   Commonly known as:  TYLENOL #3   1 tablet as needed                                alendronate 70 MG tablet   Commonly known as:  FOSAMAX   Take 70 mg by mouth once a week                                amLODIPine 10 MG tablet   Commonly known as:  NORVASC   Take 5 mg by mouth every morning                                BIOTIN 5000 5 MG Caps   Take 1 capsule by  mouth every evening   Generic drug:  biotin                                CALCIUM 600/VITAMIN D3 600-800 MG-UNIT Tabs   Take 1 tablet by mouth every evening   Generic drug:  Calcium Carb-Cholecalciferol                                CENTRUM SILVER ADULT 50+ PO   Take 1 tablet by mouth every evening                                D3 HIGH POTENCY 2000 UNITS Caps   Take 2,000 Units by mouth every evening   Generic drug:  cholecalciferol                                hydrochlorothiazide 25 MG tablet   Commonly known as:  HYDRODIURIL   Take 25 mg by mouth every morning                                KLOR-CON 8 MEQ CR tablet   Take 8 mEq by mouth every evening   Generic drug:  potassium chloride                                MAGNESIUM OXIDE PO   Take 500 mg by mouth every evening                                oxyCODONE IR 5 MG tablet   Commonly known as:  ROXICODONE   Take 1-2 tablets (5-10 mg) by mouth every 3 hours as needed for pain or other (Moderate to Severe)   Last time this was given:  5 mg on 12/12/2017 11:12 AM                                PROBIOTIC DAILY Caps   Take 1 capsule by mouth every evening                                * STOOL SOFTENER & LAXATIVE PO   Take 2 capsules by mouth 2 times daily                                * senna-docusate 8.6-50 MG per tablet   Commonly known as:  SENOKOT-S;PERICOLACE   Take 1-2 tablets by mouth 2 times daily Take while on oral narcotics to prevent or treat constipation.                                SUPER B-COMPLEX Tabs   Take 1 tablet by mouth every evening                                zinc 50 MG Tabs   Take 50 mg by mouth every evening                                zolpidem 5 MG tablet   Commonly known as:  AMBIEN   Take 5 mg by mouth nightly as needed                                * Notice:  This list has 2 medication(s) that are the same as other medications prescribed for you. Read the directions carefully, and ask your doctor or other care provider to  review them with you.

## 2017-12-12 NOTE — DISCHARGE INSTRUCTIONS
[facility]  Same-Day Surgery   Adult Discharge Orders & Instructions     For 24 hours after surgery    1. Get plenty of rest.  A responsible adult must stay with you for at least 24 hours after you leave the hospital.   2. Do not drive or use heavy equipment.  If you have weakness or tingling, don't drive or use heavy equipment until this feeling goes away.  3. Do not drink alcohol.  4. Avoid strenuous or risky activities.  Ask for help when climbing stairs.   5. You may feel lightheaded.  IF so, sit for a few minutes before standing.  Have someone help you get up.   6. If you have nausea (feel sick to your stomach): Drink only clear liquids such as apple juice, ginger ale, broth or 7-Up.  Rest may also help.  Be sure to drink enough fluids.  Move to a regular diet as you feel able.  7. You may have a slight fever. Call the doctor if your fever is over 100 F (37.7 C) (taken under the tongue) or lasts longer than 24 hours.  8. You may have a dry mouth, a sore throat, muscle aches or trouble sleeping.  These should go away after 24 hours.  9. Do not make important or legal decisions.   Call your doctor for any of the followin.  Signs of infection (fever, growing tenderness at the surgery site, a large amount of drainage or bleeding, severe pain, foul-smelling drainage, redness, swelling).    2. It has been over 8 to 10 hours since surgery and you are still not able to urinate (pass water).    3.  Headache for over 24 hours.    4.  Numbness, tingling or weakness the day after surgery (if you had spinal anesthesia).  To contact a doctor, call ________________________________________ or:        [phone] and ask for the resident on call for   ______________________________________________ (answered 24 hours a day)      Emergency Department:    [facility]: [phone]       (TTY for hearing impaired: [phone])    [facility]: [phone]       (TTY for  hearing impaired: [phone])

## 2017-12-12 NOTE — BRIEF OP NOTE
Orthopaedic Surgery Brief Op-Note      Patient: Inge Son  : 1955  Date of Service: 2017 9:57 AM    Pre-operative Diagnosis: Sacroilitis Right side   Post-operative Diagnosis: same    Procedure(s) Performed: Procedure(s):  Right Minimally Invasive Sacral Iliac Joint Fusion  - Wound Class: I-Clean    Staff: Val  Assistants:   Agustina Feng MD    Anesthesia: General  EBL: 5 cc  UOP: see anesthesia record    Implants:     Implant Name Type Inv. Item Serial No.  Lot No. LRB No. Used   IMP iFUSE-3D 7mm X 50 mm    [employer] [id number] Right 1   IMP SPINE [employer] IFUSE 7X45MM 7045-90 Total Joint Component/Insert IMP SPINE [employer] IFUSE 7X45MM 7045-90  [employer]  Right 1   IMP SPINE [employer] IFUSE 7X45MM 7045-90 Total Joint Component/Insert IMP SPINE [employer]-B[employer] IFUSE 7X45MM 7045-90   [employer] [id number] Right 1            Drains: None  Intra-op Labs/Cxs/Specimens: None  Complications: No apparent complications during procedure  Findings: Please see dictated operative note for details    Disposition: Stable to PACU, then discharge to home same day.        Assessment/Plan:   Inge Son is a 62 year old female s/p Procedure(s):  Right Minimally Invasive Sacral Iliac Joint Fusion  - Wound Class: I-Clean on 2017 with Dr. Neal.    Activity: up as tolerated. TTWB RLE x 3 weeks  Antibiotics: intra op ancef  Diet: Begin with clear fluids and progress diet as tolerated.  Bowel regimen. Anti-emetics PRN.    DVT prophylaxis: mechanical only  Dressing: Remove POD 4  Bracing/Splinting: None  Pain management: Tylenol, IV, oxycodone PRN. Transition from IV to orals as tolerated.    Follow-up: Clinic with Dr. Neal in 6 weeks, x-rays needed.    Future Appointments  Date Time Provider Department Center   2018 11:45 AM Will Neal MD [facility]   3/1/2018 10:45 AM Will Neal MD [facility]       BASILIA Feng MD  Orthopaedic Surgery Resident, PGY-4  Pager: [phone]  12/12/2017    For questions about this patient, please attempt to contact me at my pager prior to contacting the orthopaedics resident on call. Thank you!

## 2017-12-12 NOTE — IP AVS SNAPSHOT
[facility]    [address] [facility] [address]    Phone:  [phone]                                       After Visit Summary   12/12/2017    Inge Son    MRN: [id number]           After Visit Summary Signature Page     I have received my discharge instructions, and my questions have been answered. I have discussed any challenges I see with this plan with the nurse or doctor.    ..........................................................................................................................................  Patient/Patient Representative Signature      ..........................................................................................................................................  Patient Representative Print Name and Relationship to Patient    ..................................................               ................................................  Date                                            Time    ..........................................................................................................................................  Reviewed by Signature/Title    ...................................................              ..............................................  Date                                                            Time

## 2017-12-12 NOTE — OP NOTE
DATE OF SERVICE:  12/12/2017      PREOPERATIVE DIAGNOSIS:  Right sacroiliitis.      POSTOPERATIVE DIAGNOSIS:  Right sacroiliitis.      PROCEDURE:     1.  Minimally invasive right SI joint fusion.   2.  Image-guided surgery.      SURGEON:  Will Neal Jr., MD      ASSISTANT:  BASILIA Feng MD, Orthopedic resident.      INDICATION FOR OPERATION:  Ms. Inge Son is an adult female with a history of significant SI joint pain.  She has trouble even sitting because the right side hurts so much.  She feels that the right side is unstable even when she walks.  She used to be an avid walker.  She has been in physical therapy at least 15 sessions on 2 different occasions with minimal improvement.  She has had chiropractic treatment which gave equivocal benefit.  She has tried inversion tables and traction without benefit.  She has tried nerve stimulators without benefit and has tried nonsteroidal anti-inflammatories with only transient benefit.  She has tried an SI belt which does give some relief, but does not eliminate her pain and she is limited in her function with this.  She has had multiple right sacroiliac joint injections which only gave her about 2 weeks of durable pain relief but that she gets about initially 80% pain relief with the injection.  We had an extended discussion about the risks, benefits, alternative treatments and expected outcomes and it was her desire to proceed with surgery.  She went through a long appeals process with her insurance, which is [employer] and was denied and she and her  chose to pay out of pocket for this, although I provided them an information so that they can continue an appropriate appeals process.      DESCRIPTION OF PROCEDUR:  The OR team was briefed about the plan.  The patient was brought to the operating room and underwent the induction of adequate general anesthesia, was turned in position prone on the Fernando table 4 poster frame.    She was then prepared and  draped in the usual sterile fashion.  A timeout was done confirming the site and type of surgery.  She did receive prophylactic antibiotics.      The left posterosuperior iliac spine was identified by palpation.  Marcaine 0.25% with epinephrine was infiltrated over the site.  A small incision made.  A Missy clamp used to dissect down to the bone and then a short percutaneous reference pin seated into place and a tracking arc applied.  The O-arm was then used to obtain intraoperative 3D images which were transferred to the Stealth Image-guided workstation.      Image guided technology was now used to mercedes the skin to outline our plan for two S1 implants and one S2 implant.  Marcaine 0.25% with epinephrine was infiltrated into the site and then the skin incised. A navigated drill guide was now used to place K-wire into the cephalad portion of S1 and this was checked under fluoroscopy.  This was then drilled, broached and a 7.0 x 50 mm iFuse 3D implant was seated into place and checked under fluoroscopy.  Next, attention was addressed to the S2 level and then a second K-wire was passed and checked under fluoroscopy and while the placement was good in terms of bone quarters, it was going to be too close to the first implant so we repositioned and passed a K-wire and then checked it under fluoroscopy and now we had adequate room to place a second implant at S1.  Again, this was drilled, broached and a 7.0 x 45 mm iFuse 3D implant seated into place.      Next, attention was addressed to the S2 segment and again utilizing image guided technology, a K-wire was passed and checked under fluoroscopy, drilled, broached and a 7.0 x 45 mm iFuse 3D implant was seated into place.  Then 2D and 3D images were obtained which showed excellent positioning of all the implants.  Wounds were irrigated out.  Exparel 20 mL diluted out to 40 mL with normal saline was then infiltrated in both the percutaneous pin site and in the incisional  site for the iFuse implants.  Wounds were now closed in layers and the skin closed with Dermabond.      Estimated blood loss was 20 mL.  We utilized 40 mL of Marcaine, 0.25% with epinephrine and 20 mL of Exparel.         LADI GARCIA JR, MD             D: 2017 10:52   T: 2017 11:24   MT:       Name:     BRISEYDA MERCHANT   MRN:      [id number]        Account:        [id number]   :      1955           Procedure Date: 2017      Document: [id number]

## 2017-12-13 ENCOUNTER — TELEPHONE (OUTPATIENT)
Dept: ORTHOPEDICS | Facility: CLINIC | Age: 62
End: 2017-12-13

## 2017-12-13 NOTE — TELEPHONE ENCOUNTER
Outpt. Surgery yesterday.  I called pt for update.  She states she is going great.  Pain controlled with min. Pain meds.   No nausea.  Taking fluids & food well.  Had BM.  Knows WB restrictions.    Call back prn.  Pt. Agreed.  Will inform .  Amada Hill RN.

## 2017-12-15 ENCOUNTER — TELEPHONE (OUTPATIENT)
Dept: ORTHOPEDICS | Facility: CLINIC | Age: 62
End: 2017-12-15

## 2017-12-15 ENCOUNTER — MYC MEDICAL ADVICE (OUTPATIENT)
Dept: ORTHOPEDICS | Facility: CLINIC | Age: 62
End: 2017-12-15

## 2017-12-15 NOTE — TELEPHONE ENCOUNTER
See Triage note of today in response to  Pts. My CHart message about increased pain postop. I also called pt. Back for assessment & reassured her that yes  Pain in normal this soon after  Surgery especially now that she is feeling better & has increased activity slightly.    Feels better at this point.  Has enough pain med.  Has not been using Tylenol as ordered so can add that prn.  RICE.  Call back prn if pain does not improve.  Pt. Agreed.  Amada Hill RN.

## 2017-12-15 NOTE — TELEPHONE ENCOUNTER
Called pt back re: her wound questions and the images she sent. She reports doing well. No drainage from wound. Doing wound care as directed. Temp denied. Taking food and fluids without difficulty. Urinating as per normal and has had BM since surgery. F/u scheduled for 1/25/2018. Pt encouraged to call back with any questions or concerns.

## 2018-01-10 LAB — MAMMOGRAM: NORMAL

## 2018-01-15 DIAGNOSIS — M46.1 SACROILIITIS (H): Primary | ICD-10-CM

## 2018-01-21 ENCOUNTER — HEALTH MAINTENANCE LETTER (OUTPATIENT)
Age: 63
End: 2018-01-21

## 2018-01-21 ASSESSMENT — ENCOUNTER SYMPTOMS
HALLUCINATIONS: 0
POLYPHAGIA: 0
STIFFNESS: 1
ARTHRALGIAS: 1
RECTAL PAIN: 0
VOMITING: 0
BLOATING: 0
WEIGHT GAIN: 0
FEVER: 0
ABDOMINAL PAIN: 0
BACK PAIN: 1
JOINT SWELLING: 0
ALTERED TEMPERATURE REGULATION: 1
DIARRHEA: 0
INCREASED ENERGY: 0
MYALGIAS: 1
MUSCLE CRAMPS: 0
BLOOD IN STOOL: 0
DECREASED APPETITE: 0
WEIGHT LOSS: 0
NIGHT SWEATS: 0
NECK PAIN: 0
POLYDIPSIA: 0
MUSCLE WEAKNESS: 0
CONSTIPATION: 1
HEARTBURN: 0
JAUNDICE: 0
FATIGUE: 0
BOWEL INCONTINENCE: 0
NAUSEA: 0
CHILLS: 0

## 2018-01-23 ENCOUNTER — TRANSFERRED RECORDS (OUTPATIENT)
Dept: HEALTH INFORMATION MANAGEMENT | Facility: CLINIC | Age: 63
End: 2018-01-23

## 2018-01-24 ENCOUNTER — OFFICE VISIT (OUTPATIENT)
Dept: ORTHOPEDICS | Facility: CLINIC | Age: 63
End: 2018-01-24
Payer: COMMERCIAL

## 2018-01-24 ENCOUNTER — RADIANT APPOINTMENT (OUTPATIENT)
Dept: GENERAL RADIOLOGY | Facility: CLINIC | Age: 63
End: 2018-01-24
Attending: ORTHOPAEDIC SURGERY
Payer: COMMERCIAL

## 2018-01-24 VITALS — BODY MASS INDEX: 23.22 KG/M2 | WEIGHT: 136 LBS | HEIGHT: 64 IN

## 2018-01-24 DIAGNOSIS — M46.1 SACROILIITIS (H): ICD-10-CM

## 2018-01-24 DIAGNOSIS — M46.1 SACROILIITIS (H): Primary | ICD-10-CM

## 2018-01-24 NOTE — NURSING NOTE
"Reason For Visit:   Chief Complaint   Patient presents with     Surgical Followup     6 wk s/p right SI joint fusion       Primary MD: Era Tobias  Ref. MD: Era Tobias    ?  No    Occupation: Retired .  Currently working? No.  Work status?  Retired.    Date of injury: None  Type of injury: NA.    Date of surgery: 12/12/17  Type of surgery:  1.  Minimally invasive right SI joint fusion.   2.  Image-guided surgery.     Previous Surgery: 3/2013  lumbar fusion at [facility], Dr. Pennington    Smoker: No    Ht 1.626 m (5' 4\")  Wt 61.7 kg (136 lb)  BMI 23.34 kg/m2    Pain Assessment  Patient Currently in Pain: Yes  0-10 Pain Scale: 3  Primary Pain Location:  (Thigh)  Pain Orientation: Right  Pain Descriptors: Tightness  Alleviating Factors: Stretching  Aggravating Factors: Sitting    Oswestry (CICI) Questionnaire    OSWESTRY DISABILITY INDEX 1/21/2018   SECTION 1-PAIN INTENSITY The pain is moderate at the moment.   SECTION 2-PERSONAL CARE (WASHING,DRESSING,ETC.) I can look after myself normally without causing additional pain.   SECTION 3-LIFTING Pain prevents me from lifting heavy weights but I can manage light to medium weights if they are conveniently positioned.   SECTION 4-WALKING Pain prevents me from walking more than a quarter of a mile.   SECTION 5-SITTING Pain prevents me from sitting for more than 1 hour.   SECTION 6-STANDING Pain prevents me from standing for more than half an hour.   SECTION 7-SLEEPING My sleep is occasionally interrupted by pain.   SECTION 8-SEX LIFE (IF APPLICABLE) Not answered/NA   SECTION 9-SOCIAL LIFE Not answered   SECTION 10-TRAVELING Not answered   Oswestry Disability Index: Count 7   CICI: Total Score = SUM (total for answered questions) 13   Computed Oswestry Score 37.14 (%)   Some recent data might be hidden              Visual Analog Pain Scale  Back Pain Scale 0-10: 0  Right leg pain: 2.5  Left leg pain: 0    Promis 10 Assessment    PROMIS 10 1/21/2018   In " general, would you say your health is: Good   In general, would you say your quality of life is: Good   In general, how would you rate your physical health? Fair   In general, how would you rate your mental health, including your mood and your ability to think? Good   In general, how would you rate your satisfaction with your social activities and relationships? Fair   In general, please rate how well you carry out your usual social activities and roles Fair   To what extent are you able to carry out your everyday physical activities such as walking, climbing stairs, carrying groceries, or moving a chair? Moderately   How often have you been bothered by emotional problems such as feeling anxious, depressed or irritable? Sometimes   How would you rate your fatigue on average? Mild   How would you rate your pain on average?   0 = No Pain  to  10 = Worst Imaginable Pain 3   Global Physical Health Score : Raw Score -   Global Mental Health Score : Raw Score -   Total (Physical + Mental Health Score) -   In general, would you say your health is: 3   In general, would you say your quality of life is: 3   In general, how would you rate your physical health? 2   In general, how would you rate your mental health, including your mood and your ability to think? 3   In general, how would you rate your satisfaction with your social activities and relationships? 2   In general, please rate how well you carry out your usual social activities and roles. (This includes activities at home, at work and in your community, and responsibilities as a parent, child, spouse, employee, friend, etc.) 2   To what extent are you able to carry out your everyday physical activities such as walking, climbing stairs, carrying groceries, or moving a chair? 3   In the past 7 days, how often have you been bothered by emotional problems such as feeling anxious, depressed, or irritable? 3   In the past 7 days, how would you rate your fatigue on average?  2   In the past 7 days, how would you rate your pain on average, where 0 means no pain, and 10 means worst imaginable pain? 3   Global Mental Health Score 11   Global Physical Health Score 13   PROMIS TOTAL - SUBSCORES 24              Michelle Leach CMA  1/24/2018

## 2018-01-24 NOTE — LETTER
Date:February 1, 2018      Patient was self referred, no letter generated. Do not send.        [facility] Health Information

## 2018-01-24 NOTE — MR AVS SNAPSHOT
After Visit Summary   1/24/2018    Inge Son    MRN: [id number]           Patient Information     Date Of Birth          1955        Visit Information        Provider Department      1/24/2018 11:00 AM Will Neal MD [facility]        Today's Diagnoses     Sacroiliitis (H)    -  1       Follow-ups after your visit        Your next 10 appointments already scheduled     Mar 01, 2018 10:45 AM CST   (Arrive by 10:15 AM)   RETURN SPINE with Will Neal MD   [facility] ([facility])    [address] [place] [address] [id number] [phone]              Who to contact     Please call your clinic at [phone] to:    Ask questions about your health    Make or cancel appointments    Discuss your medicines    Learn about your test results    Speak to your doctor   If you have compliments or concerns about an experience at your clinic, or if you wish to file a complaint, please contact [facility] Patient Relations at [phone] or email us at [email or web address]         Additional Information About Your Visit        MyChart Information     [employer] gives you secure access to your electronic health record. If you see a primary care provider, you can also send messages to your care team and make appointments. If you have questions, please call your primary care clinic.  If you do not have a primary care provider, please call [phone] and they will assist you.      [employer] is an electronic gateway that provides easy, online access to your medical records. With [employer], you can request a clinic appointment, read your test results, renew a prescription or communicate with your care team.     To access your existing account, please contact your [facility] or call [phone] for assistance.        Care EveryWhere ID     This is your Care  "EveryWhere ID. This could be used by other organizations to access your [facility] medical records  [id number]        Your Vitals Were     Height BMI (Body Mass Index)                1.626 m (5' 4\") 23.34 kg/m2           Blood Pressure from Last 3 Encounters:   No data found for BP    Weight from Last 3 Encounters:   No data found for Wt              Today, you had the following     No orders found for display       Primary Care Provider Office Phone # Fax #    Era Tobias [phone]       [facility] [address] [address]        Equal Access to Services     JOSE BARBA : Hadii aad ku hadasho Soomaali, waaxda luqadaha, qaybta kaalmada adeegyada, amparo smith haywoodrow partida . So [facility] [phone].    ATENCIÓN: Si habla español, tiene a mosley disposición servicios gratuitos de asistencia lingüística. [facility] [phone].    We comply with applicable federal civil rights laws and [place] laws. We do not discriminate on the basis of race, color, national origin, age, disability, sex, sexual orientation, or gender identity.            Thank you!     Thank you for choosing [facility]  for your care. Our goal is always to provide you with excellent care. Hearing back from our patients is one way we can continue to improve our services. Please take a few minutes to complete the written survey that you may receive in the mail after your visit with us. Thank you!             Your Updated Medication List - Protect others around you: Learn how to safely use, store and throw away your medicines at www.disposemymeds.org.          This list is accurate as of 1/24/18 11:59 PM.  Always use your most recent med list.                   Brand Name Dispense Instructions for use Diagnosis    acetaminophen 325 MG tablet    TYLENOL    100 tablet    Take 2 tablets (650 mg) by mouth every 4 hours as needed for other (mild pain)        acetaminophen-codeine " 300-30 MG per tablet    TYLENOL #3     1 tablet as needed        alendronate 70 MG tablet    FOSAMAX     Take 70 mg by mouth once a week        amLODIPine 10 MG tablet    NORVASC     Take 5 mg by mouth every morning        BIOTIN 5000 5 MG Caps   Generic drug:  biotin      Take 1 capsule by mouth every evening        CALCIUM 600/VITAMIN D3 600-800 MG-UNIT Tabs   Generic drug:  Calcium Carb-Cholecalciferol      Take 1 tablet by mouth every evening        CENTRUM SILVER ADULT 50+ PO      Take 1 tablet by mouth every evening        D3 HIGH POTENCY 2000 UNITS Caps   Generic drug:  cholecalciferol      Take 2,000 Units by mouth every evening        hydrochlorothiazide 25 MG tablet    HYDRODIURIL     Take 25 mg by mouth every morning        KLOR-CON 8 MEQ CR tablet   Generic drug:  potassium chloride      Take 8 mEq by mouth every evening        MAGNESIUM OXIDE PO      Take 500 mg by mouth every evening        oxyCODONE IR 5 MG tablet    ROXICODONE    60 tablet    Take 1-2 tablets (5-10 mg) by mouth every 3 hours as needed for pain or other (Moderate to Severe)        PROBIOTIC DAILY Caps      Take 1 capsule by mouth every evening        * STOOL SOFTENER & LAXATIVE PO      Take 2 capsules by mouth 2 times daily        * senna-docusate 8.6-50 MG per tablet    SENOKOT-S;PERICOLACE    30 tablet    Take 1-2 tablets by mouth 2 times daily Take while on oral narcotics to prevent or treat constipation.        SUPER B-COMPLEX Tabs     1 tablet    Take 1 tablet by mouth every evening        zinc 50 MG Tabs      Take 50 mg by mouth every evening        zolpidem 5 MG tablet    AMBIEN     Take 5 mg by mouth nightly as needed        * Notice:  This list has 2 medication(s) that are the same as other medications prescribed for you. Read the directions carefully, and ask your doctor or other care provider to review them with you.

## 2018-01-24 NOTE — Clinical Note
1/24/2018       RE: Inge Son  [address] [place] [address]     Dear Colleague,    Thank you for referring your patient, Inge Son, to the [facility] at [facility]. Please see a copy of my visit note below.    No notes on file    Again, thank you for allowing me to participate in the care of your patient.      Sincerely,    Will Neal MD

## 2018-01-24 NOTE — PROGRESS NOTES
"Chief Complaint   Patient presents with     Surgical Followup     6 wk s/p right SI joint fusion       Procedure/Date:  12/12/2017    Summary of Clinic Encounter:       Ms. Son 6 weeks is status post the above.  In the interim her right SI region pain has slowly been improving. She weaned off of her crutches approximately 2 weeks ago and has been walking with a cane for long distances or nothing at all. Not currently taking any pain medications. Denies any numbness or tingling. No incontinence. Did have an episode of diverticulitis postoperatively. This is resolved.      Objective:  Ht 1.626 m (5' 4\")  Wt 61.7 kg (136 lb)  BMI 23.34 kg/m2  General - well developed and groomed. No acute distress  CV- regular rate  Pulm- No audible stridor or wheazing, nonlabored  Skin - intact, healed incisions  MSK -     5/5 strength in hip flexion and knee extension dorsi flexion and plantar flexion bilaterally. Sensation is intact in L2 through S1 dermatomes bilaterally. No clonus. Downgoing toes. 2+ reflexes at Achilles and patella bilaterally and symmetric.    Imaging:     X-ray, 3 views of the pelvis: Implants in stable position. No encroachment on sacral foramen    Assessment:  62 year old female now 6 weeks s/p the above procedure. overall doing very well. Has weaned off all assistive devices.    Plan:  - Continue physical therapy and advance activity as tolerated. Would refrain from any high impact activities including bowling until next visit.  -  Follow up at 3 months postop.    Seen and discussed with Dr. Val Laws  1/24/2018  10:44 AM    Answers for HPI/ROS submitted by the patient on 1/21/2018   General Symptoms: Yes  Skin Symptoms: No  HENT Symptoms: No  EYE SYMPTOMS: No  HEART SYMPTOMS: No  LUNG SYMPTOMS: No  INTESTINAL SYMPTOMS: Yes  URINARY SYMPTOMS: No  GYNECOLOGIC SYMPTOMS: No  BREAST SYMPTOMS: No  SKELETAL SYMPTOMS: Yes  BLOOD SYMPTOMS: No  NERVOUS SYSTEM SYMPTOMS: No  MENTAL HEALTH " SYMPTOMS: No  Fever: No  Loss of appetite: No  Weight loss: No  Weight gain: No  Fatigue: No  Night sweats: No  Chills: No  Increased stress: No  Excessive hunger: No  Excessive thirst: No  Feeling hot or cold when others believe the temperature is normal: Yes  Loss of height: No  Post-operative complications: No  Surgical site pain: Yes  Hallucinations: No  Change in or Loss of Energy: No  Hyperactivity: No  Confusion: No  Heart burn or indigestion: No  Nausea: No  Vomiting: No  Abdominal pain: No  Bloating: No  Constipation: Yes  Diarrhea: No  Blood in stool: No  Black stools: No  Rectal or Anal pain: No  Fecal incontinence: No  Yellowing of skin or eyes: No  Vomit with blood: No  Change in stools: No  Back pain: Yes  Muscle aches: Yes  Neck pain: No  Swollen joints: No  Joint pain: Yes  Bone pain: Yes  Muscle cramps: No  Muscle weakness: No  Joint stiffness: Yes  Bone fracture: No

## 2018-02-19 DIAGNOSIS — M46.1 SACROILIITIS (H): Primary | ICD-10-CM

## 2018-02-20 ENCOUNTER — TRANSFERRED RECORDS (OUTPATIENT)
Dept: HEALTH INFORMATION MANAGEMENT | Facility: CLINIC | Age: 63
End: 2018-02-20

## 2018-02-26 ASSESSMENT — ENCOUNTER SYMPTOMS
MYALGIAS: 1
FEVER: 0
ALTERED TEMPERATURE REGULATION: 1
HYPERTENSION: 0
WEIGHT GAIN: 0
STIFFNESS: 1
MUSCLE WEAKNESS: 0
DECREASED APPETITE: 0
HYPOTENSION: 0
INCREASED ENERGY: 0
CHILLS: 1
PALPITATIONS: 0
ORTHOPNEA: 0
JOINT SWELLING: 0
POLYPHAGIA: 0
NECK PAIN: 0
ARTHRALGIAS: 1
SKIN CHANGES: 0
WEIGHT LOSS: 0
SLEEP DISTURBANCES DUE TO BREATHING: 0
MUSCLE CRAMPS: 0
POOR WOUND HEALING: 0
LEG PAIN: 0
EXERCISE INTOLERANCE: 0
FATIGUE: 0
POLYDIPSIA: 0
SYNCOPE: 0
NIGHT SWEATS: 0
HALLUCINATIONS: 0
NAIL CHANGES: 0
LIGHT-HEADEDNESS: 1
BACK PAIN: 0

## 2018-03-01 ENCOUNTER — OFFICE VISIT (OUTPATIENT)
Dept: ORTHOPEDICS | Facility: CLINIC | Age: 63
End: 2018-03-01
Payer: COMMERCIAL

## 2018-03-01 ENCOUNTER — RADIANT APPOINTMENT (OUTPATIENT)
Dept: GENERAL RADIOLOGY | Facility: CLINIC | Age: 63
End: 2018-03-01
Attending: ORTHOPAEDIC SURGERY
Payer: COMMERCIAL

## 2018-03-01 VITALS — HEIGHT: 64 IN | BODY MASS INDEX: 23.01 KG/M2 | WEIGHT: 134.8 LBS

## 2018-03-01 DIAGNOSIS — G89.29 CHRONIC RIGHT SI JOINT PAIN: Primary | ICD-10-CM

## 2018-03-01 DIAGNOSIS — M46.1 SACROILIITIS (H): ICD-10-CM

## 2018-03-01 DIAGNOSIS — M53.3 CHRONIC RIGHT SI JOINT PAIN: Primary | ICD-10-CM

## 2018-03-01 NOTE — PROGRESS NOTES
Post-Operative Return    Surgery:  12/12/2017 right SI joint fusion, Dr. Neal    Subjective:  Inge Son is a 62 year old female who underwent the above mentioned procedure.  She returns to clinic today, now 3 months since the operation.  Since the last visit with us , she reports she has been doing very well. Prior to surgery, she was barely able to walk a block and is now up to 2 miles. She no longer has any pain with sitting. She reports that she is approximately 85% improved from her surgery. She does have some ongoing soreness and tightness involving the posterior right thigh. Denies numbness or tingling.    Musculoskeletal Exam:   Right gluteal incisions well-healed. Nontender over her IT band, mild tenderness to palpation of her hamstrings. Mildly positive Trendelenburg sign on the right. Neurovascularly intact distally.     Imaging:  Capellan view of the pelvis reviewed, demonstrates implants are in place. There is a very subtle lucency surrounding the implants on the sacral side.    Assessment and Plan:   3 months status post the above surgery, recovering very well  1. Activity as tolerated  2. Physical therapy prescription renewed, with recommendation to work specifically on gluteal strengthening and hamstring stretching. We would like her to continue with physical therapy until she no longer has a Trendelenburg sign.  3. Follow-up at her 6 month postoperative visit.    Patient was seen and evaluated with Dr. Val House MD  Answers for HPI/ROS submitted by the patient on 2/26/2018   General Symptoms: Yes  Skin Symptoms: Yes  HENT Symptoms: No  EYE SYMPTOMS: No  HEART SYMPTOMS: Yes  LUNG SYMPTOMS: No  INTESTINAL SYMPTOMS: No  URINARY SYMPTOMS: No  GYNECOLOGIC SYMPTOMS: No  BREAST SYMPTOMS: No  SKELETAL SYMPTOMS: Yes  BLOOD SYMPTOMS: No  NERVOUS SYSTEM SYMPTOMS: No  MENTAL HEALTH SYMPTOMS: No  Fever: No  Loss of appetite: No  Weight loss: No  Weight gain: No  Fatigue: No  Night sweats:  No  Chills: Yes  Increased stress: No  Excessive hunger: No  Excessive thirst: No  Feeling hot or cold when others believe the temperature is normal: Yes  Loss of height: No  Post-operative complications: No  Surgical site pain: Yes  Hallucinations: No  Change in or Loss of Energy: No  Hyperactivity: No  Confusion: No  Changes in hair: No  Changes in moles/birth marks: No  Itching: No  Rashes: No  Changes in nails: No  Acne: No  Hair in places you don't want it: No  Change in facial hair: No  Warts: No  Non-healing sores: No  Scarring: No  Flaking of skin: No  Color changes of hands/feet in cold : No  Sun sensitivity: No  Skin thickening: No  Chest pain or pressure: No  Fast or irregular heartbeat: No  Pain in legs with walking: No  Trouble breathing while lying down: No  Fingers or toes appear blue: No  High blood pressure: No  Low blood pressure: No  Fainting: No  Murmurs: No  Pacemaker: No  Varicose veins: No  Edema or swelling: Yes  Wake up at night with shortness of breath: No  Light-headedness: Yes  Exercise intolerance: No  Back pain: No  Muscle aches: Yes  Neck pain: No  Swollen joints: No  Joint pain: Yes  Bone pain: Yes  Muscle cramps: No  Muscle weakness: No  Joint stiffness: Yes  Bone fracture: No

## 2018-03-01 NOTE — NURSING NOTE
"Reason For Visit:   Chief Complaint   Patient presents with     Surgical Followup     3 month s/p right SI joint fusion 12/12/17       Primary MD: Era Tobias  Ref. MD: Era Tobias    ?  No    Occupation: Retired .  Currently working? No.  Work status?  Retired.    Date of injury: None  Type of injury: NA.    Date of surgery: 12/12/17  Type of surgery:   1.  Minimally invasive right SI joint fusion.   2.  Image-guided surgery.      Previous Surgery: 3/2013  lumbar fusion at [facility], Dr. Pennington    Smoker: No    Ht 1.626 m (5' 4\")  Wt 61.1 kg (134 lb 12.8 oz)  BMI 23.14 kg/m2    Pain Assessment  Patient Currently in Pain: Denies    Oswestry (CICI) Questionnaire    OSWESTRY DISABILITY INDEX 2/26/2018   Count 8   Sum 5   Oswestry Score (%) 12.5              Visual Analog Pain Scale  Back Pain Scale 0-10: 0  Right leg pain: 0  Left leg pain: 3    Promis 10 Assessment    PROMIS 10 2/26/2018   In general, would you say your health is: Good   In general, would you say your quality of life is: Good   In general, how would you rate your physical health? Good   In general, how would you rate your mental health, including your mood and your ability to think? Very good   In general, how would you rate your satisfaction with your social activities and relationships? Good   In general, please rate how well you carry out your usual social activities and roles Very good   To what extent are you able to carry out your everyday physical activities such as walking, climbing stairs, carrying groceries, or moving a chair? Mostly   How often have you been bothered by emotional problems such as feeling anxious, depressed or irritable? Sometimes   How would you rate your fatigue on average? Mild   How would you rate your pain on average?   0 = No Pain  to  10 = Worst Imaginable Pain 2   Global Physical Health Score : Raw Score -   Global Mental Health Score : Raw Score -   Total (Physical + Mental Health Score) " -   In general, would you say your health is: 3   In general, would you say your quality of life is: 3   In general, how would you rate your physical health? 3   In general, how would you rate your mental health, including your mood and your ability to think? 4   In general, how would you rate your satisfaction with your social activities and relationships? 3   In general, please rate how well you carry out your usual social activities and roles. (This includes activities at home, at work and in your community, and responsibilities as a parent, child, spouse, employee, friend, etc.) 4   To what extent are you able to carry out your everyday physical activities such as walking, climbing stairs, carrying groceries, or moving a chair? 4   In the past 7 days, how often have you been bothered by emotional problems such as feeling anxious, depressed, or irritable? 3   In the past 7 days, how would you rate your fatigue on average? 2   In the past 7 days, how would you rate your pain on average, where 0 means no pain, and 10 means worst imaginable pain? 2   Global Mental Health Score 13   Global Physical Health Score 15   PROMIS TOTAL - SUBSCORES 28              Michelle Leach CMA  3/1/2018

## 2018-03-01 NOTE — LETTER
3/1/2018       RE: Inge Son  [address] [place] [address]     Dear Colleague,    Thank you for referring your patient, Inge Son, to the [facility] at [facility]. Please see a copy of my visit note below.    Post-Operative Return    Surgery:  12/12/2017 right SI joint fusion, Dr. Neal    Subjective:  Inge Son is a 62 year old female who underwent the above mentioned procedure.  She returns to clinic today, now 3 months since the operation.  Since the last visit with us , she reports she has been doing very well. Prior to surgery, she was barely able to walk a block and is now up to 2 miles. She no longer has any pain with sitting. She reports that she is approximately 85% improved from her surgery. She does have some ongoing soreness and tightness involving the posterior right thigh. Denies numbness or tingling.    Musculoskeletal Exam:   Right gluteal incisions well-healed. Nontender over her IT band, mild tenderness to palpation of her hamstrings. Mildly positive Trendelenburg sign on the right. Neurovascularly intact distally.     Imaging:  Capellan view of the pelvis reviewed, demonstrates implants are in place. There is a very subtle lucency surrounding the implants on the sacral side.    Assessment and Plan:   3 months status post the above surgery, recovering very well  1. Activity as tolerated  2. Physical therapy prescription renewed, with recommendation to work specifically on gluteal strengthening and hamstring stretching. We would like her to continue with physical therapy until she no longer has a Trendelenburg sign.  3. Follow-up at her 6 month postoperative visit.    Patient was seen and evaluated with Dr. Val House MD    Again, thank you for allowing me to participate in the care of your patient.      Sincerely,    Will Neal MD

## 2018-03-01 NOTE — MR AVS SNAPSHOT
After Visit Summary   3/1/2018    Inge Son    MRN: [id number]           Patient Information     Date Of Birth          1955        Visit Information        Provider Department      3/1/2018 10:45 AM Will Neal MD [facility]        Today's Diagnoses     Chronic right SI joint pain    -  1       Follow-ups after your visit        Additional Services     PHYSICAL THERAPY REFERRAL (Internal)       Physical Therapy Referral                  Your next 10 appointments already scheduled     May 31, 2018 10:45 AM CDT   (Arrive by 10:15 AM)   RETURN SPINE with Will Neal MD   [facility] ([facility])    [address] [facility] [address] [phone]              Who to contact     Please call your clinic at [phone] to:    Ask questions about your health    Make or cancel appointments    Discuss your medicines    Learn about your test results    Speak to your doctor            Additional Information About Your Visit        MyChart Information     LoSot gives you secure access to your electronic health record. If you see a primary care provider, you can also send messages to your care team and make appointments. If you have questions, please call your primary care clinic.  If you do not have a primary care provider, please call [phone] and they will assist you.      GoPollGo is an electronic gateway that provides easy, online access to your medical records. With GoPollGo, you can request a clinic appointment, read your test results, renew a prescription or communicate with your care team.     To access your existing account, please contact your [facility] or call [phone] for assistance.        Care EveryWhere ID     This is your Care EveryWhere ID. This could be used by other organizations to access your [place] medical records  [id number]        Your  "Vitals Were     Height BMI (Body Mass Index)                1.626 m (5' 4\") 23.14 kg/m2           Blood Pressure from Last 3 Encounters:   No data found for BP    Weight from Last 3 Encounters:   No data found for Wt              Today, you had the following     No orders found for display       Primary Care Provider Office Phone # Fax #    Era Tobias [phone]       [place] [address] [address]        Equal Access to Services     JOSE BARBA : Hadii aad ku hadasho Soomaali, waaxda luqadaha, qaybta kaalmada adeegyada, waxay idiin hayaan adeeg kharash latony . So [facility] [phone].    ATENCIÓN: Si habla español, tiene a mosley disposición servicios gratuitos de asistencia lingüística. [facility] [phone].    We comply with applicable federal civil rights laws and [place] laws. We do not discriminate on the basis of race, color, national origin, age, disability, sex, sexual orientation, or gender identity.            Thank you!     Thank you for choosing [facility]  for your care. Our goal is always to provide you with excellent care. Hearing back from our patients is one way we can continue to improve our services. Please take a few minutes to complete the written survey that you may receive in the mail after your visit with us. Thank you!             Your Updated Medication List - Protect others around you: Learn how to safely use, store and throw away your medicines at www.disposemymeds.org.          This list is accurate as of 3/1/18 11:59 PM.  Always use your most recent med list.                   Brand Name Dispense Instructions for use Diagnosis    acetaminophen 325 MG tablet    TYLENOL    100 tablet    Take 2 tablets (650 mg) by mouth every 4 hours as needed for other (mild pain)        acetaminophen-codeine 300-30 MG per tablet    TYLENOL #3     1 tablet as needed        alendronate 70 MG tablet    FOSAMAX     Take 70 mg by mouth " once a week        amLODIPine 10 MG tablet    NORVASC     Take 5 mg by mouth every morning        BIOTIN 5000 5 MG Caps   Generic drug:  biotin      Take 1 capsule by mouth every evening        CALCIUM 600/VITAMIN D3 600-800 MG-UNIT Tabs   Generic drug:  Calcium Carb-Cholecalciferol      Take 1 tablet by mouth every evening        CENTRUM SILVER ADULT 50+ PO      Take 1 tablet by mouth every evening        D3 HIGH POTENCY 2000 UNITS Caps   Generic drug:  cholecalciferol      Take 2,000 Units by mouth every evening        hydrochlorothiazide 25 MG tablet    HYDRODIURIL     Take 25 mg by mouth every morning        KLOR-CON 8 MEQ CR tablet   Generic drug:  potassium chloride      Take 8 mEq by mouth every evening        MAGNESIUM OXIDE PO      Take 500 mg by mouth every evening        oxyCODONE IR 5 MG tablet    ROXICODONE    60 tablet    Take 1-2 tablets (5-10 mg) by mouth every 3 hours as needed for pain or other (Moderate to Severe)        PROBIOTIC DAILY Caps      Take 1 capsule by mouth every evening        * STOOL SOFTENER & LAXATIVE PO      Take 2 capsules by mouth 2 times daily        * senna-docusate 8.6-50 MG per tablet    SENOKOT-S;PERICOLACE    30 tablet    Take 1-2 tablets by mouth 2 times daily Take while on oral narcotics to prevent or treat constipation.        SUPER B-COMPLEX Tabs     1 tablet    Take 1 tablet by mouth every evening        TRI-ISABELLA 0.01-4-0.05 % Crea   Generic drug:  fluocin-hydroquinone-tretinoin      Externally apply 1 Application topically daily        zinc 50 MG Tabs      Take 50 mg by mouth every evening        zolpidem 5 MG tablet    AMBIEN     Take 5 mg by mouth nightly as needed        * Notice:  This list has 2 medication(s) that are the same as other medications prescribed for you. Read the directions carefully, and ask your doctor or other care provider to review them with you.

## 2019-01-17 ENCOUNTER — MYC MEDICAL ADVICE (OUTPATIENT)
Dept: ORTHOPEDICS | Facility: CLINIC | Age: 64
End: 2019-01-17

## 2019-01-17 DIAGNOSIS — M46.1 SACROILIITIS (H): Primary | ICD-10-CM

## 2019-01-17 DIAGNOSIS — M79.604 RIGHT LEG PAIN: ICD-10-CM

## 2019-01-17 DIAGNOSIS — Z87.39 H/O BURNING PAIN IN LEG: ICD-10-CM

## 2019-01-17 DIAGNOSIS — M79.18 RIGHT BUTTOCK PAIN: ICD-10-CM

## 2019-01-18 NOTE — TELEPHONE ENCOUNTER
See MyChart message.  I called pt to get more info. About symptoms.  They started about 6 months ago.  I told her about 's medical leave & she could see  sooner who also specializes in SI JOint & she said she wants to wait until 2-21-19 because her  is having shoulder surgery in Jan.   I asked her to sign TIFF & have [facility] Left hip workup & imaging sent to  us & she agreed.    I also ordered PT with Catherine Sanderson our expert SI JOint therapist for her eval. If this is Quadratus Lumborum spasms or not & pt.  agreed & will call to schedule.    Call back  Prn.  S.O./Fransisco Hill RN.

## 2019-01-29 ENCOUNTER — MYC MEDICAL ADVICE (OUTPATIENT)
Dept: ORTHOPEDICS | Facility: CLINIC | Age: 64
End: 2019-01-29

## 2019-01-29 ENCOUNTER — TELEPHONE (OUTPATIENT)
Dept: ORTHOPEDICS | Facility: CLINIC | Age: 64
End: 2019-01-29

## 2019-01-29 NOTE — TELEPHONE ENCOUNTER
See phone message.   See My Chart message back to pt. From Triage.   I called pt.  She will hand carry reports from [facility] of hip workup.  Amada Hill RN.

## 2019-01-29 NOTE — TELEPHONE ENCOUNTER
[facility]    Phone Message    May a detailed message be left on voicemail: yes    Reason for Call: Other: Pt was wondering f Amada recieved images and report from [facility] - It was mailed on 01/18/2019     Action Taken: Message routed to:  [facility] ([facility]): Orthopedics    See phone msara.   See My Chart message back to pt. From Triage.  I called pt.  She will hand carry reports from [facility] of hip workup.  Amada Hill RN.

## 2019-01-31 ENCOUNTER — DOCUMENTATION ONLY (OUTPATIENT)
Dept: CARE COORDINATION | Facility: CLINIC | Age: 64
End: 2019-01-31

## 2019-02-04 ENCOUNTER — TELEPHONE (OUTPATIENT)
Dept: ORTHOPEDICS | Facility: CLINIC | Age: 64
End: 2019-02-04

## 2019-02-04 NOTE — TELEPHONE ENCOUNTER
[facility] Call Center    Phone Message    May a detailed message be left on voicemail: yes    Reason for Call:Other: Pt calling Amada back about her medical records.  Amada called and let her know that she has the disc but the report isn't there. Pt stated that the reports should be in a slot behind the disc inside a folder.  Please call this Pt back once its been found  . Thank you.    Action Taken: Message routed to:  [facility] [place]    I called pt back & let her know that our radiology expert Marlene said she will be able to get reports & put them in our system.  Amada Hill RN.

## 2019-02-05 ENCOUNTER — THERAPY VISIT (OUTPATIENT)
Dept: PHYSICAL THERAPY | Facility: CLINIC | Age: 64
End: 2019-02-05
Payer: COMMERCIAL

## 2019-02-05 DIAGNOSIS — M79.604 RIGHT LEG PAIN: ICD-10-CM

## 2019-02-05 DIAGNOSIS — M46.1 SACROILIITIS (H): ICD-10-CM

## 2019-02-05 DIAGNOSIS — Z87.39 H/O BURNING PAIN IN LEG: ICD-10-CM

## 2019-02-05 DIAGNOSIS — M79.18 RIGHT BUTTOCK PAIN: ICD-10-CM

## 2019-02-05 PROCEDURE — 97110 THERAPEUTIC EXERCISES: CPT | Mod: GP | Performed by: PHYSICAL THERAPIST

## 2019-02-05 PROCEDURE — 97163 PT EVAL HIGH COMPLEX 45 MIN: CPT | Mod: GP | Performed by: PHYSICAL THERAPIST

## 2019-02-05 PROCEDURE — 97530 THERAPEUTIC ACTIVITIES: CPT | Mod: GP | Performed by: PHYSICAL THERAPIST

## 2019-02-05 NOTE — PROGRESS NOTES
Inge Malcolm is a patient of Dr. Neal's; R SIJ fusion 12/2017.  She has not had a follow up since her 6 week post op due to appointment changes and other factors.  During this timeframe she also had a full work up on her L hip and it requires a RAMU.  She continues to try to walk, exercise and play golf with her .       Today she presents with primary complaint of R sided pain, lower lumbar into the midfoot region.  She is able to induce the pain bending to put on her 's shoes, getting on her socks and doing a squat.       Clinical testing (-) screen hip/SIJ.   2/5 (+) SIJ tests, 1/3 (+) hip tests.  (-) SLR, crossed SLR.  (-) facet load.  Palpatory pain just lateral to spinous process at L5 with pain upon palpation.  Suspect possible discogenic origin of sx due to onset of pain this summer with golf.  Trial of extension based movement today with recommendation to follow up with PT closer to home.  My card was provided for questions/concerns.  Additionally she was wondering if there is a possibility of an injection at the level or any additional treatment options prior to her appointment with you in 2.5 weeks.       Thanks for the referral and please advise if there is anything additionally we need to do with the patient at this time.       Take care,   Catherine

## 2019-02-05 NOTE — PROGRESS NOTES
[facility] Initial Evaluation  Physical Therapy Initial Examination/Evaluation    February 5, 2019    Inge Son  is a 63 year old  female referred to physical therapy by Dr. Neal for treatment of SIJ, differential dx of quadratus lumborum spasm.      DOI/onset 6 months ago, gradually worsening  Mechanism of injury unknown.  In the last year the left hip has really been an issue as well.  I went through a diagnostic work up beginning in the end of July last year.  It was determined that a hip replacement is needed.    DOS 3/14 (L4-5 fusion), 12/17 (R SIJ fusion, just over a year.    Prior treatment injection (L hip). Effect of prior treatment lasted 3-4 days.     Chief Complaint:   R side, the left hip is a daily thing but whatever is in the right, going down the back of the leg is terrible.  It is down the side of my knee and into my right foot.  It is like someone took a razor blade and stuck it into my foot.  I feel it with forward bending, shaving my legs.  My knee is kind of numb feeling and in the back of the knee I get really uncomfortable.    Quality: aching, sharp  Constant/Intermittent: intermittent  Time of day: evening  Symptoms have worsened since onset.    Current pain 2/10.  Pain at worst 10/10.    Symptoms aggravated by activity.    Symptoms improved with laying straight, still.     Social history:  Pt is , 2 grown children; 11 year old grandaughter, 8 and 10 year old grandchildren.   retired 1 year ago.  Pt enjoys golfing (this is her life), bicycling, walking.  Reports her  is an exercise fanatic.  Currently pt rides bicycle in the winter, completes a lot of stretching for pain.  Hip ER/abd push down helps.    Occupation: retired.  Job duties:  Home duties.    Patient having difficulty with ADLs: bending.    Patient's goals are improve pain.    Patient reports general health as good.  Related medical history skin cancer, high blood pressure, implanted  device, osteoarthritis L hip, stroke 2007, calf pain.    Surgical History:  Lumbar, SIJ.    Imaging: hip, lumbar.    Medications:  Bone density, blood pressure.       Outcome measure:   Jyoti Stevenson  Return to MD:  As needed.      Clinical Impression: Inge presets to [facility] with primary complaint of R sided pain.  Per clinical testing (-) screen hip/SIJ.   2/5 (+) SIJ tests, 1/3 (+) hip tests.  (-) SLR, crossed SLR.  (-) facet load.  Palpatory pain just lateral to spinous process at L5 with pain upon palpation.  Suspect possible discogenic origin of sx due to onset of pain this summer with golf.  Trial of extension based movement today with recommendation to follow up with PT closer to home.  PT's card was provided for questions/concerns. Will follow up for means of pain management until follow up in 2 weeks, see plan of care outlined below.        Subjective:    Observation:   - L illiac crest slightly lower   - decr lordosis L4 segmentally at lumbar fusion   - flat gluteal region  - noted gastroc atrophy R       SLS:   - Slight Trendelenburg L.  Mild trunk lean R first attempt.good second  EC: 15 sec without issue L, >10 sec R with incr trunk compesnations    Gait: Normal gait speed, push off.  Incr stance time L LE with CL UE guarding noted.      Squat:   - Good mechanics, slight offloading L LE >70 deg.  Radicular sx into R produced     Screen:   - Strength: heel and toe walk WNL.            HPI                    Objective:  System         Lumbar/SI Evaluation  ROM:    AROM Lumbar:   Flexion:            75% tightness into hamstring   Ext:                    50% no change upon repeated motoin    Side Bend:        Left:  WFL    Right:  WFL  Rotation:           Left:  WFL    Right:  WFL   Side Glide:        Left:     Right:         Strength: Hip ER R 3+/5, L 4-/5; hamstring resisted (+) pain/zinger on the R; lumbar mytomes 5/5 all lumbar   Lumbar Myotomes:  normal            Lumbar DTR's:  not  assessed      Cord Signs:  not assessed    Lumbar Dermtomes:  not assessed                Neural Tension/Mobility:      Left side:SLR or SLR w/DF  negative.     Right side:   SLR w/DF or SLR  negative.       Lumbar Provocation:  Lumbar provocation: Deferred L due to known pathology hip, (-) hip flexion, FADIR slight groin pain, (+) SARAH posterior hip vs radicular sx.      Spinal Segmental Conclusions: L5 painful       Level: Hyper noted at L5      SI joint/Sacrum:      Provocation:  (+) Sacral thrust deep in the pelvis vs radicular in nature.      Left negative at:    Squish; Ilium Ventromedial; Thigh thrust and Sacral thrust  Right positive at:    Sacral thrust  Right negative at:  Squish; Ilium Ventromedial and Thigh thrust                                                 General     ROS    Assessment/Plan:    Patient is a 63 year old female with lumbar complaints.    Patient has the following significant findings with corresponding treatment plan.                Diagnosis 1:  SIJ/quadratus lumborum    Pain -  hot/cold therapy, US, mechanical traction, manual therapy, self management, education, directional preference exercise and home program  Decreased strength - therapeutic exercise and therapeutic activities  Impaired muscle performance - neuro re-education  Decreased function - therapeutic activities  Impaired posture - neuro re-education    Therapy Evaluation Codes:   1) History comprised of:   Personal factors that impact the plan of care:      Time since onset of symptoms.    Comorbidity factors that impact the plan of care are:      Cancer, High blood pressure, Implanted device, Osteoarthritis, Stroke and Calf pain .     Medications impacting care: Bone density and High blood pressure.  2) Examination of Body Systems comprised of:   Body structures and functions that impact the plan of care:      Lumbar spine and Pelvis.   Activity limitations that impact the plan of care are:      Bathing, Bending,  Cooking, Driving, Dressing, Lifting, Sports, Squatting/kneeling, Stairs, Standing and Walking.  3) Clinical presentation characteristics are:   Evolving/Changing.  4) Decision-Making    High complexity using standardized patient assessment instrument and/or measureable assessment of functional outcome.  Cumulative Therapy Evaluation is: High complexity.    Previous and current functional limitations:  (See Goal Flow Sheet for this information)    Short term and Long term goals: (See Goal Flow Sheet for this information)     Communication ability:  Patient appears to be able to clearly communicate and understand verbal and written communication and follow directions correctly.  Treatment Explanation - The following has been discussed with the patient:   RX ordered/plan of care  Anticipated outcomes  Possible risks and side effects  This patient would benefit from PT intervention to resume normal activities.   Rehab potential is good.    Frequency:  1 X week, once daily  Duration:  for 1 visits  Discharge Plan:  Achieve all LTG.  Independent in home treatment program.  Reach maximal therapeutic benefit.  Pt to further follow up with PT at a location closer to home.  See plan of care outlined below. To be discharged from this site following this visit.     Please refer to the daily flowsheet for treatment today, total treatment time and time spent performing 1:1 timed codes.

## 2019-02-18 NOTE — TELEPHONE ENCOUNTER
RECORDS RECEIVED FROM: DR. GARCIA PT.   F/U Jan 2019 PT with Catherine MORTON for Right buttock & Right lef to foot burning & pain. pt having records from 2018 Left hip workup including MRI at [facility] sent to us per Amada LANDEROS 12/12/2017, Rt SI Joint Fusion - FU Appt - Per Pt   DATE RECEIVED: 02/18/19    NOTES STATUS DETAILS   OFFICE NOTE from referring provider Internal Dr. Garcia 3/1/18, 1/24/18, 10/12/17, 08/03/17, 6/28/17   OFFICE NOTE from other specialist Care Everywhere Dr. Jones 10/16/18   DISCHARGE SUMMARY from hospital Care Everywhere Dr. Jones 8/14/18, 8/9/18   DISCHARGE REPORT from the ER N/A    OPERATIVE REPORT Internal and CE Dr. Garcia 12/12/17  Dr. Pennington 3/12/13   MEDICATION LIST Internal    IMPLANT RECORD/STICKER Internal and CE    LABS     CBC/DIFF N/A    CULTURES N/A    INJECTIONS DONE IN RADIOLOGY Internal  10/18/17, 6/28/17   MRI PACS 8/9/18, 1/19/17, 10/29/15, 11/17/12   CT SCAN PACS 2/9/18, 11/3/17, 7/24/17...   XRAYS (IMAGES & REPORTS) PACS 10/16/18, 3/1/18, 1/24/18...   TUMOR     PATHOLOGY  Slides & report N/A

## 2019-02-18 NOTE — PROGRESS NOTES
Spine Surgical Hx:  3/12/2013 - L4-5 TLIF by Dr. Pennington at [facility] - very pleased with results  12/12/2017 - Right SI joint fusion by Dr. Will Neal. [Implants: SI-Bone iFuse].    REASON FOR CONSULTATION: Surgical Followup (Dr. Neal pt. s/p right SI joint fusion 12/12/17) and RECHECK (Having pain in right buttock and down to foot on Right side. )     REFERRING PHYSICIAN: Referred Self   PCP:Era Tobias    History of Present Illness:  Inge Son is a 63 year old female with a PMH significant for osteoporosis, and a h/o CVA (residual Left hearing loss) who is s/p 12/12/2017 Right SI joint fusion by Dr. Neal.  She was last evaluated by Dr. Neal on 3/1/2018.  At that point in time, she reported that she was doing very well; approximately 85% improvement from her prior to surgery status.  She did have some ongoing soreness and tightness involving the posterior right thigh however, denied numbness/tingling.  She was referred for continued physical therapy in order to improve her gait.  Unfortunately, following her surgical intervention she began to develop left hip pain.  Denied any inciting event. She was last seen by Dr. Cordoba at [facility] on 10/16/2018 for her left hip pain. Her pain was localized to the anterior left groin; denied radiation. At that visit, Dr. Cordoba discussed that osteoarthritis is a progressive condition therefore, they can continue to continue utilizing conservative methods to treat her symptoms however, ultimately a left hip arthroplasty may be indicated.    Today, reports that her Right SI joint pain is resolved. Her symptoms today are distinct from her pre-operative symptoms. She is very happy with her SI joint fusion. However, now, she complains of low back pain with Right sided radicular symptoms extending to the medial instep of her foot which started in Summer 2018 and began worsening in October.  Now, she is bothered daily by constant Right LBP and worsening intermittent Right  L5 radiculopathy. She reports specific activities that worsen her radiculopathy including bending down and squatting. She describes her radicular pain as shooting, stabbing sensation. Denies numbness/tingling. Denies issues with bowel/bladder incontinence. Today, she would like to start the process to determine the cause of her Right LBP and associated L5 radicular symptoms.     Back 60%, Leg 40%,  Right Only  Worse: Weightbearing, squatting  Better: Rest, and sitting    Conservative treatment for LBP and radicular symptoms:  1. Physical therapy  2. Medications: Tylenol    Conservative treatment for her Left hip:   1. Physical therapy  2.  Activity modification  3.  Fluoroscopic guided left intra-articular hip injection with Kenalog -improvement in pain for 3 days        Oswestry (CICI) Questionnaire    OSWESTRY DISABILITY INDEX 2/20/2019   Count 10   Sum 24   Oswestry Score (%) 48        ROS:  A 12-point review of systems was completed and is negative except for otherwise noted above in the history of present illness.    Med Hx:  Past Medical History:   Diagnosis Date     Chronic pain      Decreased hearing of left ear      GERD (gastroesophageal reflux disease)     Occasional symptoms, uses TUMS PRN.     H/O CVA (cerebral vascular accident) (H)     Residual hearing loss on the left.     History of skin cancer      HTN (hypertension)      Osteoporosis      PONV (postoperative nausea and vomiting)        Surg Hx:  Past Surgical History:   Procedure Laterality Date     BUNIONECTOMY Bilateral      HYSTERECTOMY TOTAL ABDOMINAL, BILATERAL SALPINGO-OOPHORECTOMY, COMBINED       OPTICAL TRACKING SYSTEM FUSION SACRAL ILIAC Right 12/12/2017    Procedure: OPTICAL TRACKING SYSTEM FUSION SACRAL ILIAC;  Right Minimally Invasive Sacral Iliac Joint Fusion ;  Surgeon: Will Neal MD;  Location: [facility]     ORTHOPEDIC SURGERY      Cyst removed from right knee.     RELEASE TRIGGER FINGER Right      SPINE SURGERY  2013    L 4-5  "TLIF       Allergies:  No Known Allergies    Meds:  Current Outpatient Medications   Medication     acetaminophen (TYLENOL) 325 MG tablet     acetaminophen-codeine (TYLENOL #3) 300-30 MG per tablet     albuterol (VENTOLIN HFA) 108 (90 Base) MCG/ACT inhaler     alendronate (FOSAMAX) 70 MG tablet     amLODIPine (NORVASC) 5 MG tablet     fluocin-hydroquinone-tretinoin (TRI-ISABELLA) 0.01-4-0.05 % CREA     hydrochlorothiazide (HYDRODIURIL) 25 MG tablet     Inulin-Cholecalciferol 2.5-500 GM-UNIT CHEW     Multiple Vitamins-Minerals (CENTRUM SILVER ADULT 50+ PO)     potassium chloride (KLOR-CON) 8 MEQ CR tablet     Probiotic Product (PROBIOTIC DAILY) CAPS     senna-docusate (SENOKOT-S;PERICOLACE) 8.6-50 MG per tablet     vitamin B-12 (CYANOCOBALAMIN) 1000 MCG tablet     zinc 50 MG TABS     zolpidem (AMBIEN) 5 MG tablet     No current facility-administered medications for this visit.        Fam Hx:  Family History   Problem Relation Age of Onset     Lung Cancer Mother      Coronary Artery Disease Mother      Heart Disease Father      Stomach Cancer Father         Survived     Cerebrovascular Disease Father      Hypertension Father      Kidney Disease Father         Only one functioning kidney.     Polymyositis Sister      Lymphoma Brother         Mantle     Hypertension Brother      Crohn's Disease Sister      Coronary Artery Disease Brother        P/S Hx:  Social History     Tobacco Use     Smoking status: Former Smoker     Packs/day: 0.50     Years: 30.00     Pack years: 15.00     Types: Cigarettes     Last attempt to quit: 2013     Years since quittin.1     Smokeless tobacco: Never Used   Substance Use Topics     Alcohol use: Yes     Comment: 2-3 drinks in a week.           Physical Exam:  Very pleasant, healthy appearing, alert, oriented x 3, cooperative.  Normal mood and affect.  Not in cardiorespiratory distress.  Ht 1.626 m (5' 4\")   Wt 60.8 kg (134 lb)   BMI 23.00 kg/m     Normal upright posture.    Normal gait " without assistive device.  No antalgia / imbalance.  Able to walk on toes and on heels with ease.  Back: no deformity, no skin lesions or surgical scars.  Localizes pain at Right low back (not PSIS)  Tenderness: (-) midline, (+) Right paraspinal, (-) R and L PSIS.  ROM:   Full painless extension.   Full painless flexion, able to reach down to toes.     Neuro Exam:  Motor: 5/5 strength for all muscle groups in both LE's.  Sensory:  Intact to light touch in both LE's.   Reflexes:  Knee 2+ bilat.  Ankle 2+ bilat.  (-) Babinski, (-) clonus.    Lower Extremity:  Equal leg lengths, full pulses, (-) atrophy / asymmetry.  Full painless passive knee and ankle motion.  Straight leg raise: (-) right, (-) left.  Hip impingement: (-) right, (+) left.  SARAH/Santos's: (+) right, (+) left.        Imaging:  EOS full spine AP lateral standing x-rays taken today show s/p TLIF with bilateral pedicle screw fixation L4-5 that is likely already solid.  There are also 3 fusion rods across the right sacroiliac joint without evidence of loosening or migration.  Overall sagittal balance appears acceptable although the L4-5 fusion level appears to have been fused in a somewhat flat position.    Lumbar MRI from 10/29/2015 or more than 3 years ago show postsurgical changes with posterior instrumentation L4-5.  The subjacent L5-S1 level shows mild degenerative changes but no significant stenosis or evidence of nerve compression.  No other lumbar MRI since that time.    Impression:   - Right-sided low back and radicular leg pain suggestive of L5 pattern, etiol(?)  - 14 mos s/p MIS R SIJ fusion, with improved R SIJ pain.  - 6 yrs s/p open TLIF L4-5, with probable solid arthrodesis.     Plan:   Order MRI Lumbar spine - to eval nerve pinching, jj at L5-S1.  Order Right L5-S1 transforaminal DEJA - dxtic/txtic  Order pelvic CT - to eval for sean loosening.  Follow up following completion of the above     Azra Townsend MD  Orthopaedic Surgery,  PGY-1    Attestation:  I (Dr. Mahendra Armstrong - Spine Surgeon) have personally evaluated patient with PGY-1 Townsend, and agree with findings and plan outlined in the note.  I discussed at length with the patient/family, explained the nature of spinal condition, and formulated workup and/or treatment plan together.  All questions were answered to the best of my ability and to patient's apparent satisfaction.    TT > 25 mins, > 50% CC.    Respectfully,    Mahendra Armstrong MD    Orthopaedic Spine Surgery  Dept Orthopaedic Surgery, [facility]  [phone] office, [phone] pager  [email or web address]      Answers for HPI/ROS submitted by the patient on 2/20/2019   General Symptoms: No  Skin Symptoms: No  HENT Symptoms: No  EYE SYMPTOMS: Yes  HEART SYMPTOMS: No  LUNG SYMPTOMS: No  INTESTINAL SYMPTOMS: Yes  URINARY SYMPTOMS: No  GYNECOLOGIC SYMPTOMS: No  BREAST SYMPTOMS: No  SKELETAL SYMPTOMS: Yes  BLOOD SYMPTOMS: No  NERVOUS SYSTEM SYMPTOMS: Yes  MENTAL HEALTH SYMPTOMS: Yes  Dry eyes: Yes  Constipation: Yes  Back pain: Yes  Muscle aches: Yes  Neck pain: No  Swollen joints: Yes  Joint pain: Yes  Bone pain: Yes  Muscle cramps: No  Muscle weakness: No  Joint stiffness: Yes  Bone fracture: No  Trouble with coordination: No  Dizziness or trouble with balance: No  Fainting or black-out spells: No  Memory loss: No  Headache: No  Seizures: No  Speech problems: No  Tingling: Yes  Tremor: No  Weakness: No  Difficulty walking: Yes  Nervous or Anxious: Yes  Depression: No  Trouble sleeping: Yes  Trouble thinking or concentrating: No  Mood changes: Yes  Panic attacks: No

## 2019-02-20 DIAGNOSIS — M54.9 BACK PAIN: Primary | ICD-10-CM

## 2019-02-20 ASSESSMENT — ENCOUNTER SYMPTOMS
MYALGIAS: 1
ARTHRALGIAS: 1
TREMORS: 0
STIFFNESS: 1
DISTURBANCES IN COORDINATION: 0
MUSCLE CRAMPS: 0
DIZZINESS: 0
SEIZURES: 0
SPEECH CHANGE: 0
MEMORY LOSS: 0
LOSS OF CONSCIOUSNESS: 0
HEADACHES: 0
DECREASED CONCENTRATION: 0
NERVOUS/ANXIOUS: 1
CONSTIPATION: 1
MUSCLE WEAKNESS: 0
JOINT SWELLING: 1
PANIC: 0
WEAKNESS: 0
NECK PAIN: 0
INSOMNIA: 1
DEPRESSION: 0
BACK PAIN: 1
TINGLING: 1

## 2019-02-21 ENCOUNTER — PRE VISIT (OUTPATIENT)
Dept: ORTHOPEDICS | Facility: CLINIC | Age: 64
End: 2019-02-21

## 2019-02-21 ENCOUNTER — ANCILLARY PROCEDURE (OUTPATIENT)
Dept: GENERAL RADIOLOGY | Facility: CLINIC | Age: 64
End: 2019-02-21
Attending: ORTHOPAEDIC SURGERY
Payer: COMMERCIAL

## 2019-02-21 ENCOUNTER — OFFICE VISIT (OUTPATIENT)
Dept: ORTHOPEDICS | Facility: CLINIC | Age: 64
End: 2019-02-21
Payer: COMMERCIAL

## 2019-02-21 VITALS — BODY MASS INDEX: 22.88 KG/M2 | WEIGHT: 134 LBS | HEIGHT: 64 IN

## 2019-02-21 DIAGNOSIS — M54.41 ACUTE RIGHT-SIDED LOW BACK PAIN WITH RIGHT-SIDED SCIATICA: Primary | ICD-10-CM

## 2019-02-21 DIAGNOSIS — M54.9 BACK PAIN: ICD-10-CM

## 2019-02-21 RX ORDER — ALBUTEROL SULFATE 90 UG/1
2 AEROSOL, METERED RESPIRATORY (INHALATION)
COMMUNITY
Start: 2018-05-25 | End: 2019-10-24

## 2019-02-21 RX ORDER — AMLODIPINE BESYLATE 5 MG/1
5 TABLET ORAL EVERY MORNING
COMMUNITY
Start: 2018-05-07

## 2019-02-21 RX ORDER — LANOLIN ALCOHOL/MO/W.PET/CERES
1000 CREAM (GRAM) TOPICAL
COMMUNITY
Start: 2019-01-01 | End: 2019-10-24

## 2019-02-21 ASSESSMENT — MIFFLIN-ST. JEOR: SCORE: 1152.82

## 2019-02-21 NOTE — LETTER
2/21/2019       RE: Inge Son  [address] [place] [address]     Dear Colleague,    Thank you for referring your patient, Inge Son, to the [facility]. Please see a copy of my visit note below.    Spine Surgical Hx:  3/12/2013 - L4-5 TLIF by Dr. ePnnington at [facility] - very pleased with results  12/12/2017 - Right SI joint fusion by Dr. Will Neal. [Implants: SI-Bone iFuse].    REASON FOR CONSULTATION: Surgical Followup (Dr. Neal pt. s/p right SI joint fusion 12/12/17) and RECHECK (Having pain in right buttock and down to foot on Right side. )     REFERRING PHYSICIAN: Referred Self   PCP:Era Tobias    History of Present Illness:  Inge Son is a 63 year old female with a PMH significant for osteoporosis, and a h/o CVA (residual Left hearing loss) who is s/p 12/12/2017 Right SI joint fusion by Dr. Neal.  She was last evaluated by Dr. Neal on 3/1/2018.  At that point in time, she reported that she was doing very well; approximately 85% improvement from her prior to surgery status.  She did have some ongoing soreness and tightness involving the posterior right thigh however, denied numbness/tingling.  She was referred for continued physical therapy in order to improve her gait.  Unfortunately, following her surgical intervention she began to develop left hip pain.  Denied any inciting event. She was last seen by Dr. Cordoba at [facility] on 10/16/2018 for her left hip pain. Her pain was localized to the anterior left groin; denied radiation. At that visit, Dr. Cordoba discussed that osteoarthritis is a progressive condition therefore, they can continue to continue utilizing conservative methods to treat her symptoms however, ultimately a left hip arthroplasty may be indicated.    Today, reports that her Right SI joint pain is resolved. Her symptoms today are distinct from her pre-operative symptoms. She is very happy with her SI  joint fusion. However, now, she complains of low back pain with Right sided radicular symptoms extending to the medial instep of her foot which started in Summer 2018 and began worsening in October.  Now, she is bothered daily by constant Right LBP and worsening intermittent Right L5 radiculopathy. She reports specific activities that worsen her radiculopathy including bending down and squatting. She describes her radicular pain as shooting, stabbing sensation. Denies numbness/tingling. Denies issues with bowel/bladder incontinence. Today, she would like to start the process to determine the cause of her Right LBP and associated L5 radicular symptoms.     Back 60%, Leg 40%,  Right Only  Worse: Weightbearing, squatting  Better: Rest, and sitting    Conservative treatment for LBP and radicular symptoms:  1. Physical therapy  2. Medications: Tylenol    Conservative treatment for her Left hip:   1. Physical therapy  2.  Activity modification  3.  Fluoroscopic guided left intra-articular hip injection with Kenalog -improvement in pain for 3 days    Oswestry (CICI) Questionnaire    OSWESTRY DISABILITY INDEX 2/20/2019   Count 10   Sum 24   Oswestry Score (%) 48        ROS:  A 12-point review of systems was completed and is negative except for otherwise noted above in the history of present illness.    Med Hx:  Past Medical History:   Diagnosis Date     Chronic pain      Decreased hearing of left ear      GERD (gastroesophageal reflux disease)     Occasional symptoms, uses TUMS PRN.     H/O CVA (cerebral vascular accident) (H)     Residual hearing loss on the left.     History of skin cancer      HTN (hypertension)      Osteoporosis      PONV (postoperative nausea and vomiting)         Surg Hx:  Past Surgical History:   Procedure Laterality Date     BUNIONECTOMY Bilateral      HYSTERECTOMY TOTAL ABDOMINAL, BILATERAL SALPINGO-OOPHORECTOMY, COMBINED       OPTICAL TRACKING SYSTEM FUSION SACRAL ILIAC Right 12/12/2017     Procedure: OPTICAL TRACKING SYSTEM FUSION SACRAL ILIAC;  Right Minimally Invasive Sacral Iliac Joint Fusion ;  Surgeon: Will Neal MD;  Location: [facility] OR     ORTHOPEDIC SURGERY      Cyst removed from right knee.     RELEASE TRIGGER FINGER Right      SPINE SURGERY  2013    L 4-5 TLIF       Allergies:  No Known Allergies    Meds:  Current Outpatient Medications   Medication     acetaminophen (TYLENOL) 325 MG tablet     acetaminophen-codeine (TYLENOL #3) 300-30 MG per tablet     albuterol (VENTOLIN HFA) 108 (90 Base) MCG/ACT inhaler     alendronate (FOSAMAX) 70 MG tablet     amLODIPine (NORVASC) 5 MG tablet     fluocin-hydroquinone-tretinoin (TRI-ISABELLA) 0.01-4-0.05 % CREA     hydrochlorothiazide (HYDRODIURIL) 25 MG tablet     Inulin-Cholecalciferol 2.5-500 GM-UNIT CHEW     Multiple Vitamins-Minerals (CENTRUM SILVER ADULT 50+ PO)     potassium chloride (KLOR-CON) 8 MEQ CR tablet     Probiotic Product (PROBIOTIC DAILY) CAPS     senna-docusate (SENOKOT-S;PERICOLACE) 8.6-50 MG per tablet     vitamin B-12 (CYANOCOBALAMIN) 1000 MCG tablet     zinc 50 MG TABS     zolpidem (AMBIEN) 5 MG tablet     No current facility-administered medications for this visit.        Fam Hx:  Family History   Problem Relation Age of Onset     Lung Cancer Mother      Coronary Artery Disease Mother      Heart Disease Father      Stomach Cancer Father         Survived     Cerebrovascular Disease Father      Hypertension Father      Kidney Disease Father         Only one functioning kidney.     Polymyositis Sister      Lymphoma Brother         Mantle     Hypertension Brother      Crohn's Disease Sister      Coronary Artery Disease Brother        P/S Hx:  Social History     Tobacco Use     Smoking status: Former Smoker     Packs/day: 0.50     Years: 30.00     Pack years: 15.00     Types: Cigarettes     Last attempt to quit:      Years since quittin.1     Smokeless tobacco: Never Used   Substance Use Topics     Alcohol use: Yes      "Comment: 2-3 drinks in a week.         Physical Exam:  Very pleasant, healthy appearing, alert, oriented x 3, cooperative.  Normal mood and affect.  Not in cardiorespiratory distress.  Ht 1.626 m (5' 4\")   Wt 60.8 kg (134 lb)   BMI 23.00 kg/m     Normal upright posture.    Normal gait without assistive device.  No antalgia / imbalance.  Able to walk on toes and on heels with ease.  Back: no deformity, no skin lesions or surgical scars.  Localizes pain at Right low back (not PSIS)  Tenderness: (-) midline, (+) Right paraspinal, (-) R and L PSIS.  ROM:   Full painless extension.   Full painless flexion, able to reach down to toes.     Neuro Exam:  Motor: 5/5 strength for all muscle groups in both LE's.  Sensory:  Intact to light touch in both LE's.   Reflexes:  Knee 2+ bilat.  Ankle 2+ bilat.  (-) Babinski, (-) clonus.    Lower Extremity:  Equal leg lengths, full pulses, (-) atrophy / asymmetry.  Full painless passive knee and ankle motion.  Straight leg raise: (-) right, (-) left.  Hip impingement: (-) right, (+) left.  SARAH/Santos's: (+) right, (+) left.        Imaging:  EOS full spine AP lateral standing x-rays taken today show s/p TLIF with bilateral pedicle screw fixation L4-5 that is likely already solid.  There are also 3 fusion rods across the right sacroiliac joint without evidence of loosening or migration.  Overall sagittal balance appears acceptable although the L4-5 fusion level appears to have been fused in a somewhat flat position.    Lumbar MRI from 10/29/2015 or more than 3 years ago show postsurgical changes with posterior instrumentation L4-5.  The subjacent L5-S1 level shows mild degenerative changes but no significant stenosis or evidence of nerve compression.  No other lumbar MRI since that time.    Impression:   - Right-sided low back and radicular leg pain suggestive of L5 pattern, etiol(?)  - 14 mos s/p MIS R SIJ fusion, with improved R SIJ pain.  - 6 yrs s/p open TLIF L4-5, with probable " solid arthrodesis.     Plan:   Order MRI Lumbar spine - to eval nerve pinching, jj at L5-S1.  Order Right L5-S1 transforaminal DEJA - dxtic/txtic  Order pelvic CT - to eval for sean loosening.  Follow up following completion of the above     Azra Townsend MD  Orthopaedic Surgery, PGY-1    All questions and concerns were answered to the patient's apparent satisfaction before leaving the clinic.     TT > 25 mins, > 50% CC.    Respectfully,  Mahendra Armstrong MD    Orthopaedic Spine Surgery  Dept Orthopaedic Surgery, [facility]  [phone] office, [phone] pager  [email or web address]

## 2019-02-21 NOTE — NURSING NOTE
"Reason For Visit:   Chief Complaint   Patient presents with     Surgical Followup     Dr. Neal pt. s/p right SI joint fusion 12/12/17     RECHECK     Having pain in right buttock and down to foot on Right side.      Primary MD: Era Tobias  Ref. MD: Era Tobias     ?  No     Occupation: Retired .  Currently working? No.  Work status?  Retired.     Date of injury: None  Type of injury: NA.     Date of surgery: 12/12/17  Type of surgery:   1.  Minimally invasive right SI joint fusion.   2.  Image-guided surgery.      Previous Surgery: 3/2013  lumbar fusion at [facility], Dr. Pennington     Smoker: No    Ht 1.626 m (5' 4\")   Wt 60.8 kg (134 lb)   BMI 23.00 kg/m      Pain Assessment  Patient Currently in Pain: Yes  0-10 Pain Scale: 4  Primary Pain Location: Back  Pain Descriptors: Discomfort, Aching, Sharp    Oswestry (CICI) Questionnaire    OSWESTRY DISABILITY INDEX 2/20/2019   Count 10   Sum 24   Oswestry Score (%) 48        Visual Analog Pain Scale  Back Pain Scale 0-10: 6  Right leg pain: 3.5  Left leg pain: 0    Promis 10 Assessment    PROMIS 10 2/20/2019   In general, would you say your health is: Fair   In general, would you say your quality of life is: Fair   In general, how would you rate your physical health? Poor   In general, how would you rate your mental health, including your mood and your ability to think? Good   In general, how would you rate your satisfaction with your social activities and relationships? Fair   In general, please rate how well you carry out your usual social activities and roles Poor   To what extent are you able to carry out your everyday physical activities such as walking, climbing stairs, carrying groceries, or moving a chair? A little   How often have you been bothered by emotional problems such as feeling anxious, depressed or irritable? Often   How would you rate your fatigue on average? Mild   How would you rate your pain on average?   0 = No Pain  to  " 10 = Worst Imaginable Pain 6   Global Physical Health Score : Raw Score -   Global Mental Health Score : Raw Score -   Total (Physical + Mental Health Score) -   In general, would you say your health is: 2   In general, would you say your quality of life is: 2   In general, how would you rate your physical health? 1   In general, how would you rate your mental health, including your mood and your ability to think? 3   In general, how would you rate your satisfaction with your social activities and relationships? 2   In general, please rate how well you carry out your usual social activities and roles. (This includes activities at home, at work and in your community, and responsibilities as a parent, child, spouse, employee, friend, etc.) 1   To what extent are you able to carry out your everyday physical activities such as walking, climbing stairs, carrying groceries, or moving a chair? 2   In the past 7 days, how often have you been bothered by emotional problems such as feeling anxious, depressed, or irritable? 4   In the past 7 days, how would you rate your fatigue on average? 2   In the past 7 days, how would you rate your pain on average, where 0 means no pain, and 10 means worst imaginable pain? 6   Global Mental Health Score 9   Global Physical Health Score 10   PROMIS TOTAL - SUBSCORES 19   Some recent data might be hidden                Narda Hernandez LPN

## 2019-03-01 ENCOUNTER — TRANSFERRED RECORDS (OUTPATIENT)
Dept: HEALTH INFORMATION MANAGEMENT | Facility: CLINIC | Age: 64
End: 2019-03-01

## 2019-03-09 ASSESSMENT — ENCOUNTER SYMPTOMS
HYPERTENSION: 0
BACK PAIN: 1
ORTHOPNEA: 0
MUSCLE WEAKNESS: 0
LIGHT-HEADEDNESS: 0
EXERCISE INTOLERANCE: 0
PALPITATIONS: 0
NECK PAIN: 0
STIFFNESS: 1
JOINT SWELLING: 1
SLEEP DISTURBANCES DUE TO BREATHING: 0
MUSCLE CRAMPS: 0
SYNCOPE: 0
LEG PAIN: 1
ARTHRALGIAS: 1
MYALGIAS: 1
HYPOTENSION: 0

## 2019-03-12 ENCOUNTER — OFFICE VISIT (OUTPATIENT)
Dept: ORTHOPEDICS | Facility: CLINIC | Age: 64
End: 2019-03-12
Payer: COMMERCIAL

## 2019-03-12 VITALS — HEIGHT: 64 IN | BODY MASS INDEX: 22.88 KG/M2 | WEIGHT: 134 LBS

## 2019-03-12 DIAGNOSIS — M47.27 LUMBOSACRAL SPONDYLOSIS WITH RADICULOPATHY: Primary | ICD-10-CM

## 2019-03-12 DIAGNOSIS — M40.209 ACQUIRED KYPHOSIS: ICD-10-CM

## 2019-03-12 ASSESSMENT — MIFFLIN-ST. JEOR: SCORE: 1152.82

## 2019-03-12 NOTE — NURSING NOTE
Teaching Flowsheet   Relevant Diagnosis: spondy  Teaching Topic: preop ALIF     Person(s) involved in teaching:   Patient     Motivation Level:  Asks Questions: Yes  Eager to Learn: Yes  Cooperative: Yes  Receptive (willing/able to accept information): Yes  Any cultural factors/Worship beliefs that may influence understanding or compliance? No       Patient demonstrates understanding of the following:  Reason for the appointment, diagnosis and treatment plan: Yes  Knowledge of proper use of medications and conditions for which they are ordered (with special attention to potential side effects or drug interactions): Yes  Which situations necessitate calling provider and whom to contact: Yes       Teaching Concerns Addressed:        Proper use and care of meds. (medical equip, care aids, etc.): Yes  Nutritional needs and diet plan: Yes  Pain management techniques: Yes  Wound Care: Yes  How and/when to access community resources: Yes     Instructional Materials Used/Given: preop pkt     Time spent with patient: 15 minutes.

## 2019-03-12 NOTE — PROGRESS NOTES
"Spine Surgical Hx:  3/12/2013 - Open TLIF L4-5 (Dr. Pennington, [facility]).  [Implants: Rosa Trio screws, AVS-PL PEEK cage].  Very pleased with results  12/12/2017 - Right MIS SIJ fusion (Dr. Will Neal). [Implants: SI-Bone iFuse].      Reason for Visit: review results    Last Visit: 2/21/19    Previous Impression:  - Right-sided low back and radicular leg pain suggestive of L5 pattern, etiol(?)  - 14 mos s/p MIS R SIJ fusion, with improved R SIJ pain.  - 6 yrs s/p open TLIF L4-5, with probable solid arthrodesis.     Previous Plan:  Order MRI Lumbar spine - to eval nerve pinching, jj at L5-S1.  Order Right L5-S1 transforaminal DEJA - dxtic/txtic  Order pelvic CT - to eval for sean loosening.      S>  63/f, here to review and discuss.  Accompanied by .    3/1/19 - R L5-S1 TFESI ([facility], [place] c/o Dr. Neil Frank).  Per report, 90% initial pain relief.  Per patient, 95% relief x few days.  At present, mostly worn off, but the zinging sensation she gets in her R foot is still gone.    40% back, 60% R leg pain - lateral thigh, lateral calf, medial foot.  NO L leg pain.      Oswestry (CICI) Questionnaire    OSWESTRY DISABILITY INDEX 3/12/2019   Count 10   Sum 25   Oswestry Score (%) 50        Visual Analog Pain Scale  Back Pain Scale 0-10: 3.5  Right leg pain: 3.5  Left leg pain: 0    PROMIS-10 Scores  Global Mental Health Score: (P) 9  Global Physical Health Score: (P) 11  PROMIS TOTAL - SUBSCORES: (P) 20    O>   Alert, oriented x 3, cooperative.  Not in CP distress.  Ht 1.626 m (5' 4\")   Wt 60.8 kg (134 lb)   BMI 23.00 kg/m    Ambulates independently.   Grossly neurologically intact.  Detailed exam not performed today; please see previous note.    Imaging:   No new x-rays today.    Pelvic CT from 3/1/2019 performed at [facility] and scanned into our system shows stable 3 sean fixation across the right sacroiliac joint.  No evidence of loosening.    Lumbar MRI from 3/1/2019 performed at [facility] and uploaded to our system shows " postsurgical change L4-5.  There is spondylosis with bilateral facet arthropathy L5-S1.  There is some L5-S1 stenosis with swelling of the traversing right S1 nerve root.  It is harder to tell whether there is also compression of the L5 nerve root.     Previous EOS full spine x-rays show L4-5 fusion to be relatively flat.  However, has low PI (37 degrees), and L4-S1 lordosis (23 degrees) is actually at the ballpark of ideal (24 degrees).    A>  1.  Subjacent level spondylosis L5-S1.  2.  Right S1 radiculopathy, with positive response to R L5-S1 TFESI.  3.  No significant sagittal malalignment.    P>   Had good long discussion with patient and .  Discussed her injection response.  Reviewed and discussed her CT and MRI results.    I believe her L5-S1 level is primarily responsible for ongoing symptoms, with irritation of traversing S1 nerve roots; also with significant facet arthropathy / spondylosis at this level.  At this point, has exhausted nonop treatment, including PT, injections, medicatinos, activity modification.  Continues to have significant disabling pain.   Reasonable to consider surgery at this point.  Discussed surg options: fusion vs decompression.  Discussed pros and cons; discussed postop restrictions and course.  Patient would mainly like to be able to go back to playing golf this summer if possible.  Elects to proceed with fusion.  Discussed approaches; recommend anterior-posterior approach with Smith-Carroll osteotomy; discussed rationale; disucssed alternative appraoch (posterior only).  Agrees to AP approach.  Note: had previous vaginal hysterectomy; no other abdominal surgeries.  Discussed bone graft options; agreed to on-label use of Infuse BMP (Small kit).    Surg request placed: ALIF L5-S1; use of BMP; PISF-SPO L5-S1, poss pelvic fixation.  PAC.  Questions answered.  TT > 40 mins, > 50% CC.       Mahendra Armstrong MD    Orthopaedic Spine Surgery  Dept  Orthopaedic Surgery, [facility]  [phone] office, [phone] pager  [email or web address]

## 2019-03-12 NOTE — LETTER
"3/12/2019       RE: Inge Son  [address] [place] [address]     Dear Colleague,    Thank you for referring your patient, Inge Son, to the [facility] Please see a copy of my visit note below.    Spine Surgical Hx:  3/12/2013 -  Open TLIF L4-5 (Dr. Pennington, [facility]).  [Implants: Rosa Trio screws, AVS-PL PEEK cage].  Very pleased with results  12/12/2017 - Right  MIS SIJ fusion (Dr. Will Neal). [Implants: SI-Bone iFuse].    Reason for Visit: review results    Last Visit: 2/21/19    Previous Impression:  - Right-sided low back and radicular leg pain suggestive of L5 pattern, etiol(?)  - 14 mos s/p MIS R SIJ fusion, with improved R SIJ pain.  - 6 yrs s/p open TLIF L4-5, with probable solid arthrodesis.     Previous Plan:  Order MRI Lumbar spine - to eval nerve pinching, jj at L5-S1.  Order Right L5-S1 transforaminal DEJA - dxtic/txtic  Order pelvic CT - to eval for sean loosening.    S>  63/f, here to review and discuss.  Accompanied by .    3/1/19 - R L5-S1 TFESI ([facility] c/o Dr. Neil Frank).  Per report, 90% initial pain relief.  Per patient, 95% relief x few days.  At present, mostly worn off, but the zinging sensation she gets in her R foot is still gone.    40% back, 60% R leg pain - lateral thigh, lateral calf, medial foot.  NO L leg pain.    Oswestry (CICI) Questionnaire    OSWESTRY DISABILITY INDEX 3/12/2019   Count 10   Sum 25   Oswestry Score (%) 50        Visual Analog Pain Scale  Back Pain Scale 0-10: 3.5  Right leg pain: 3.5  Left leg pain: 0    PROMIS-10 Scores  Global Mental Health Score: (P) 9  Global Physical Health Score: (P) 11  PROMIS TOTAL - SUBSCORES: (P) 20    O>   Alert, oriented x 3, cooperative.  Not in CP distress.  Ht 1.626 m (5' 4\")   Wt 60.8 kg (134 lb)   BMI 23.00 kg/m     Ambulates independently.   Grossly neurologically intact.  Detailed exam not performed today; please see previous " note.    Imaging:   No new x-rays today.    Pelvic CT from 3/1/2019 performed at [facility] and scanned into our system shows stable 3 sean fixation across the right sacroiliac joint.  No evidence of loosening.    Lumbar MRI from 3/1/2019 performed at [facility] and uploaded to our system shows postsurgical change L4-5.  There is spondylosis with bilateral facet arthropathy L5-S1.  There is some L5-S1 stenosis with swelling of the traversing right S1 nerve root.  It is harder to tell whether there is also compression of the L5 nerve root.     Previous EOS full spine x-rays show L4-5 fusion to be relatively flat.  However, has low PI (37 degrees), and L4-S1 lordosis (23 degrees) is actually at the ballpark of ideal (24 degrees).    A>  1.  Subjacent level spondylosis L5-S1.  2.  Right S1 radiculopathy, with positive response to R L5-S1 TFESI.  3.  No significant sagittal malalignment.    P>   Had good long discussion with patient and .  Discussed her injection response.  Reviewed and discussed her CT and MRI results.    I believe her L5-S1 level is primarily responsible for ongoing symptoms, with irritation of traversing S1 nerve roots; also with significant facet arthropathy / spondylosis at this level.  At this point, has exhausted nonop treatment, including PT, injections, medicatinos, activity modification.  Continues to have significant disabling pain.   Reasonable to consider surgery at this point.  Discussed surg options: fusion vs decompression.  Discussed pros and cons; discussed postop restrictions and course.  Patient would mainly like to be able to go back to playing golf this summer if possible.  Elects to proceed with fusion.  Discussed approaches; recommend anterior-posterior approach with Smith-Carroll osteotomy; discussed rationale; disucssed alternative appraoch (posterior only).  Agrees to AP approach.  Note: had previous vaginal hysterectomy; no other abdominal surgeries.  Discussed bone graft options;  agreed to on-label use of Infuse BMP (Small kit).    Surg request placed: ALIF L5-S1; use of BMP; PISF-SPO L5-S1, poss pelvic fixation.  PAC.  Questions answered.  TT > 40 mins, > 50% CC.     Mahendra Armstrong MD    Orthopaedic Spine Surgery  Dept Orthopaedic Surgery, [facility]  [phone] office, [phone] pager  [email or web address]

## 2019-03-12 NOTE — NURSING NOTE
"Reason For Visit:   Chief Complaint   Patient presents with     RECHECK     F/U MRI, CT, and Injection       Primary MD: Era Tobias  Ref. MD: Era Tobias     ?  No     Occupation: Retired .  Currently working? No.  Work status?  Retired.     Date of injury: None  Type of injury: NA.     Date of surgery: 12/12/17  Type of surgery:   1.  Minimally invasive right SI joint fusion.   2.  Image-guided surgery.      Previous Surgery: 3/2013  lumbar fusion at [facility], Dr. Pennington     Smoker: No       Ht 1.626 m (5' 4\")   Wt 60.8 kg (134 lb)   BMI 23.00 kg/m      Pain Assessment  Patient Currently in Pain: Yes  0-10 Pain Scale: 4  Primary Pain Location: Leg  Pain Descriptors: Constant    Oswestry (CICI) Questionnaire    OSWESTRY DISABILITY INDEX 3/12/2019   Count 10   Sum 25   Oswestry Score (%) 50          Visual Analog Pain Scale  Back Pain Scale 0-10: 3.5  Right leg pain: 3.5  Left leg pain: 0    Promis 10 Assessment    PROMIS 10 3/9/2019   In general, would you say your health is: Fair   In general, would you say your quality of life is: Fair   In general, how would you rate your physical health? Fair   In general, how would you rate your mental health, including your mood and your ability to think? Good   In general, how would you rate your satisfaction with your social activities and relationships? Poor   In general, please rate how well you carry out your usual social activities and roles Poor   To what extent are you able to carry out your everyday physical activities such as walking, climbing stairs, carrying groceries, or moving a chair? A little   How often have you been bothered by emotional problems such as feeling anxious, depressed or irritable? Sometimes   How would you rate your fatigue on average? Mild   How would you rate your pain on average?   0 = No Pain  to  10 = Worst Imaginable Pain 6   Global Physical Health Score : Raw Score -   Global Mental Health Score : Raw Score - "   Total (Physical + Mental Health Score) -   In general, would you say your health is: 2   In general, would you say your quality of life is: 2   In general, how would you rate your physical health? 2   In general, how would you rate your mental health, including your mood and your ability to think? 3   In general, how would you rate your satisfaction with your social activities and relationships? 1   In general, please rate how well you carry out your usual social activities and roles. (This includes activities at home, at work and in your community, and responsibilities as a parent, child, spouse, employee, friend, etc.) 1   To what extent are you able to carry out your everyday physical activities such as walking, climbing stairs, carrying groceries, or moving a chair? 2   In the past 7 days, how often have you been bothered by emotional problems such as feeling anxious, depressed, or irritable? 3   In the past 7 days, how would you rate your fatigue on average? 2   In the past 7 days, how would you rate your pain on average, where 0 means no pain, and 10 means worst imaginable pain? 6   Global Mental Health Score 9   Global Physical Health Score 11   PROMIS TOTAL - SUBSCORES 20   Some recent data might be hidden                Narda Hernandez LPN

## 2019-03-28 ENCOUNTER — TELEPHONE (OUTPATIENT)
Dept: ORTHOPEDICS | Facility: CLINIC | Age: 64
End: 2019-03-28

## 2019-03-28 NOTE — TELEPHONE ENCOUNTER
Patient is scheduled for surgery with Dr. Armstrong      Spoke or left message with: Spoke with Inge    Date of Surgery: 4/22/19    Location: [place]    Informed patient they will need an adult  Yes    H&P: Scheduled with PAC    Additional imaging/appointments: N/A    Surgery packet: Given in clinic    Additional comments: Patient will await pre admission phone call 1-2 days prior to surgery for arrival time

## 2019-04-08 ENCOUNTER — PRE VISIT (OUTPATIENT)
Dept: SURGERY | Facility: CLINIC | Age: 64
End: 2019-04-08

## 2019-04-08 NOTE — TELEPHONE ENCOUNTER
FUTURE VISIT INFORMATION      SURGERY INFORMATION:    Date: 19    Location: [facility]    Surgeon:  Mahendra Armstrong    Anesthesia Type:  General    RECORDS REQUESTED FROM:       Primary Care Provider: Era Green    Pertinent Medical History:    Most recent EKG+ Tracin17- Norm[facility]    Most recent ECHO: 2008- [facility]

## 2019-04-10 ENCOUNTER — OFFICE VISIT (OUTPATIENT)
Dept: SURGERY | Facility: CLINIC | Age: 64
End: 2019-04-10
Payer: COMMERCIAL

## 2019-04-10 ENCOUNTER — ANESTHESIA EVENT (OUTPATIENT)
Dept: SURGERY | Facility: CLINIC | Age: 64
DRG: 454 | End: 2019-04-10
Payer: COMMERCIAL

## 2019-04-10 VITALS
TEMPERATURE: 97.9 F | SYSTOLIC BLOOD PRESSURE: 149 MMHG | WEIGHT: 136.3 LBS | HEART RATE: 63 BPM | DIASTOLIC BLOOD PRESSURE: 81 MMHG | HEIGHT: 64 IN | BODY MASS INDEX: 23.27 KG/M2 | OXYGEN SATURATION: 98 % | RESPIRATION RATE: 16 BRPM

## 2019-04-10 DIAGNOSIS — M47.27 LUMBOSACRAL SPONDYLOSIS WITH RADICULOPATHY: ICD-10-CM

## 2019-04-10 DIAGNOSIS — Z01.818 PRE-OP EXAMINATION: Primary | ICD-10-CM

## 2019-04-10 LAB
ANION GAP SERPL CALCULATED.3IONS-SCNC: 6 MMOL/L (ref 3–14)
BUN SERPL-MCNC: 14 MG/DL (ref 7–30)
CALCIUM SERPL-MCNC: 9.7 MG/DL (ref 8.5–10.1)
CHLORIDE SERPL-SCNC: 100 MMOL/L (ref 94–109)
CO2 SERPL-SCNC: 31 MMOL/L (ref 20–32)
CREAT SERPL-MCNC: 0.73 MG/DL (ref 0.52–1.04)
ERYTHROCYTE [DISTWIDTH] IN BLOOD BY AUTOMATED COUNT: 12.8 % (ref 10–15)
GFR SERPL CREATININE-BSD FRML MDRD: 88 ML/MIN/{1.73_M2}
GLUCOSE SERPL-MCNC: 91 MG/DL (ref 70–99)
HCT VFR BLD AUTO: 45.8 % (ref 35–47)
HGB BLD-MCNC: 14.6 G/DL (ref 11.7–15.7)
INR PPP: 1 (ref 0.86–1.14)
MCH RBC QN AUTO: 29 PG (ref 26.5–33)
MCHC RBC AUTO-ENTMCNC: 31.9 G/DL (ref 31.5–36.5)
MCV RBC AUTO: 91 FL (ref 78–100)
PLATELET # BLD AUTO: 202 10E9/L (ref 150–450)
POTASSIUM SERPL-SCNC: 3.3 MMOL/L (ref 3.4–5.3)
RBC # BLD AUTO: 5.03 10E12/L (ref 3.8–5.2)
SODIUM SERPL-SCNC: 137 MMOL/L (ref 133–144)
WBC # BLD AUTO: 6.8 10E9/L (ref 4–11)

## 2019-04-10 RX ORDER — IBUPROFEN 200 MG
600 TABLET ORAL PRN
Status: ON HOLD | COMMUNITY
End: 2019-04-26

## 2019-04-10 RX ORDER — OMEGA-3S/DHA/EPA/FISH OIL 1000-1400
2 CAPSULE,DELAYED RELEASE (ENTERIC COATED) ORAL EVERY MORNING
COMMUNITY
End: 2019-10-24

## 2019-04-10 RX ORDER — SCOLOPAMINE TRANSDERMAL SYSTEM 1 MG/1
1 PATCH, EXTENDED RELEASE TRANSDERMAL ONCE
Status: CANCELLED | OUTPATIENT
Start: 2019-04-10 | End: 2019-04-10

## 2019-04-10 RX ORDER — ACETAMINOPHEN 500 MG
1000 TABLET ORAL PRN
Status: ON HOLD | COMMUNITY
End: 2019-04-26

## 2019-04-10 ASSESSMENT — PATIENT HEALTH QUESTIONNAIRE - PHQ9
SUM OF ALL RESPONSES TO PHQ QUESTIONS 1-9: 8
SUM OF ALL RESPONSES TO PHQ QUESTIONS 1-9: 8

## 2019-04-10 ASSESSMENT — MIFFLIN-ST. JEOR: SCORE: 1163.25

## 2019-04-10 ASSESSMENT — ENCOUNTER SYMPTOMS: ORTHOPNEA: 0

## 2019-04-10 ASSESSMENT — LIFESTYLE VARIABLES: TOBACCO_USE: 1

## 2019-04-10 NOTE — H&P
Pre-Operative H & P     CC:  Preoperative exam to assess for increased cardiopulmonary risk while undergoing surgery and anesthesia.    Date of Encounter: 4/10/2019  Primary Care Physician:  Era Tobias     Reason for visit: pre operative examination, lumbosacral spondylosis with radiculopathy    HPI  Inge Son is a 63 year old female who presents for pre-operative H & P in preparation for Part 1 (supine): ALIF L5-S1, Part 2 (prone): PISF-SPO L5-S1; possible pelvic fixation with Dr. Armstrong and Dr. Bowling on 4/22/19 at [facility].     The patient is a 63 year old woman who has a history of back pain. She is s/p L4-5 TLIF by Dr. Pennington at [facility] on 3/12/13. She then had a right SI joint fusion by Dr. Neal on 12/12/17. She was doing well but then continued to have right thigh soreness and tightness. She continued with PT for the right hip pain but then noticed development of left hip pain as well. She was seen by Dr. Cordoba at [facility] and recommended to continue with conservative management. She then developed worsening back pain and saw seen by Dr. Armstrong on 2/21/19. She underwent a DEJA on 3/1/19 and had improvement in her pain for a few days but then the pain returned to her back and right leg. She followed up with Dr. Armstrong on 3/12/19 and is scheduled for the procedure as above.     History is obtained from the patient and chart review    Past Medical History  Past Medical History:   Diagnosis Date     Chronic pain      Decreased hearing of left ear      GERD (gastroesophageal reflux disease)     Occasional symptoms, uses TUMS PRN.     H/O CVA (cerebral vascular accident) (H)     Residual hearing loss on the left.     History of skin cancer      HTN (hypertension)      Osteoarthritis      Osteopenia      PONV (postoperative nausea and vomiting)        Past Surgical History  Past Surgical History:   Procedure Laterality Date     BUNIONECTOMY Bilateral       HYSTERECTOMY TOTAL ABDOMINAL, BILATERAL SALPINGO-OOPHORECTOMY, COMBINED       OPTICAL TRACKING SYSTEM FUSION SACRAL ILIAC Right 12/12/2017    Procedure: OPTICAL TRACKING SYSTEM FUSION SACRAL ILIAC;  Right Minimally Invasive Sacral Iliac Joint Fusion ;  Surgeon: Will Neal MD;  Location: [facility] OR     ORTHOPEDIC SURGERY      Cyst removed from right knee.     RELEASE TRIGGER FINGER Right      SPINE SURGERY  2013    L 4-5 TLIF       Hx of Blood transfusions/reactions: denies     Hx of abnormal bleeding or anti-platelet use: none    Menstrual history: No LMP recorded. Patient has had a hysterectomy.:     Steroid use in the last year: denies    Personal or FH with difficulty with Anesthesia:  PONV    Prior to Admission Medications  Current Outpatient Medications   Medication Sig Dispense Refill     acetaminophen (TYLENOL) 500 MG tablet Take 1,000 mg by mouth as needed for mild pain       alendronate (FOSAMAX) 70 MG tablet Take 70 mg by mouth once a week Sunday       amLODIPine (NORVASC) 5 MG tablet Take 5 mg by mouth every morning        Cholecalciferol (VITAMIN D PO) Take 1 tablet by mouth daily (with dinner)       FIBER ADULT GUMMIES 2 g CHEW Take 2 tablets by mouth every morning       hydrochlorothiazide (HYDRODIURIL) 25 MG tablet Take 25 mg by mouth every morning        ibuprofen (ADVIL/MOTRIN) 200 MG tablet Take 600 mg by mouth as needed for mild pain       Multiple Vitamins-Minerals (CENTRUM SILVER ADULT 50+ PO) Take 1 tablet by mouth daily (with dinner)        Omega-3 Fatty Acids (FISH OIL PO) Take 1 tablet by mouth daily (with dinner)       potassium chloride (KLOR-CON) 8 MEQ CR tablet Take 8 mEq by mouth daily (with dinner)        Probiotic Product (PROBIOTIC DAILY) CAPS Take 1 capsule by mouth daily (with dinner)        psyllium (METAMUCIL) 28.3 % packet Take 1 packet by mouth every morning       senna-docusate (SENOKOT-S;PERICOLACE) 8.6-50 MG per tablet Take 1-2 tablets by mouth 2 times daily Take while  on oral narcotics to prevent or treat constipation. (Patient taking differently: Take 1 tablet by mouth daily (with dinner) Take while on oral narcotics to prevent or treat constipation.) 30 tablet 0     vitamin B-12 (CYANOCOBALAMIN) 1000 MCG tablet Take 1,000 mcg by mouth daily (with dinner)        zinc 50 MG TABS Take 50 mg by mouth daily (with dinner)        zolpidem (AMBIEN) 5 MG tablet Take 5 mg by mouth nightly as needed        acetaminophen-codeine (TYLENOL #3) 300-30 MG per tablet Take 1 tablet by mouth as needed        albuterol (VENTOLIN HFA) 108 (90 Base) MCG/ACT inhaler Inhale 2 puffs into the lungs         Allergies  No Known Allergies    Social History  Social History     Socioeconomic History     Marital status:      Spouse name: Not on file     Number of children: Not on file     Years of education: Not on file     Highest education level: Not on file   Occupational History     Not on file   Social Needs     Financial resource strain: Not on file     Food insecurity:     Worry: Not on file     Inability: Not on file     Transportation needs:     Medical: Not on file     Non-medical: Not on file   Tobacco Use     Smoking status: Former Smoker     Packs/day: 0.50     Years: 30.00     Pack years: 15.00     Types: Cigarettes     Last attempt to quit: 2013     Years since quittin.2     Smokeless tobacco: Never Used   Substance and Sexual Activity     Alcohol use: Yes     Comment: 2-3 drinks in a week.       Drug use: No     Sexual activity: Yes     Partners: Male     Birth control/protection: Female Surgical   Lifestyle     Physical activity:     Days per week: Not on file     Minutes per session: Not on file     Stress: Not on file   Relationships     Social connections:     Talks on phone: Not on file     Gets together: Not on file     Attends Yarsani service: Not on file     Active member of club or organization: Not on file     Attends meetings of clubs or organizations: Not on file      Relationship status: Not on file     Intimate partner violence:     Fear of current or ex partner: Not on file     Emotionally abused: Not on file     Physically abused: Not on file     Forced sexual activity: Not on file   Other Topics Concern     Not on file   Social History Narrative     Not on file       Family History  Family History   Problem Relation Age of Onset     Lung Cancer Mother      Coronary Artery Disease Mother      Heart Disease Father      Stomach Cancer Father         Survived     Cerebrovascular Disease Father      Hypertension Father      Kidney Disease Father         Only one functioning kidney.     Polymyositis Sister      Lymphoma Brother         [place]     Hypertension Brother      Crohn's Disease Sister      Coronary Artery Disease Brother        Review of Systems    ROS/MED HX    ENT/Pulmonary:     (+)tobacco use, Past use , . .   (-) recent URI   Neurologic: Comment: CVA, woke up during the night unable to move her right arm, couldn't hear in the left ear.  Leaning when got up to go to the bathroom.  Went back to sleep.  When she got up the following AM, symptoms had resolved, with the exception of the hearing loss.  Evaluated and it was eventually discovered that she had a small stroke.  Continues to have complete hearing in the left ear.    (+)CVA date: 2007 with deficits- Hearing loss in the left ear.  ,    (-) TIA   Cardiovascular:     (+) hypertension-range: 120's/70's, ---. : . . . :. . Previous cardiac testing date:results:Stress Testdate:7/16/17 results:ECG reviewed date:6/23/17 results:SR date: results:         (-) taking anticoagulants/antiplatelets, DENNIS and orthopnea/PND   METS/Exercise Tolerance: Comment: Activity is limited by chronic back pain.   3 - Able to walk 1-2 blocks without stopping   Hematologic:  - neg hematologic  ROS      (-) history of blood clots and History of Transfusion   Musculoskeletal:  - neg musculoskeletal ROS       GI/Hepatic: Comment: Occasional GERD  "symptoms, takes TUMS 1-2 times a week.      (+) GERD Other,       Renal/Genitourinary:  - ROS Renal section negative       Endo:  - neg endo ROS       Psychiatric: Comment: Anxiety regarding chronic pain and inability to do activities.      (+) psychiatric history anxiety      Infectious Disease:  - neg infectious disease ROS       Malignancy:   (+) Malignancy History of Skin  Skin CA Remission status post Surgery,         Other:    (+) H/O Chronic Pain,H/O chronic opiod use , no other significant disability          The complete review of systems is negative other than noted in the HPI or here.   Temp: 97.9  F (36.6  C) Temp src: Oral BP: 149/81 Pulse: 63   Resp: 16 SpO2: 98 %         136 lbs 4.8 oz  5' 4\"   Body mass index is 23.4 kg/m .       Physical Exam  Constitutional: Awake, alert, cooperative, no apparent distress, and appears stated age.  Eyes: Pupils equal, round and reactive to light, extra ocular muscles intact, sclera clear, conjunctiva normal.  HENT: Normocephalic, oral pharynx with moist mucus membranes, good dentition. No goiter appreciated.   Respiratory: Clear to auscultation bilaterally, no crackles or wheezing.  Cardiovascular: Regular rate and rhythm, normal S1 and S2, and no murmur noted.  Carotids +2, no bruits. No edema. Palpable pulses to radial  DP and PT arteries.   GI: Normal bowel sounds, soft, non-distended, non-tender, no masses palpated, no hepatosplenomegaly.  Surgical scars: two small scars in the suprapubic area  Lymph/Hematologic: No cervical lymphadenopathy and no supraclavicular lymphadenopathy.  Genitourinary:  defer  Skin: Warm and dry.  No rashes at anticipated surgical site.   Musculoskeletal: Full ROM of neck. There is no redness, warmth, or swelling of the joints. Gross motor strength is normal.    Neurologic: Awake, alert, oriented to name, place and time. Cranial nerves II-XII are grossly intact. Gait is normal.   Neuropsychiatric: Calm, cooperative. Normal affect. "     Labs: (personally reviewed)  Results for BRISEYDA MERCHANT (MRN [id number]) as of 4/10/2019 16:39   Ref. Range 4/10/2019 15:00   Sodium Latest Ref Range: 133 - 144 mmol/L 137   Potassium Latest Ref Range: 3.4 - 5.3 mmol/L 3.3 (L)   Chloride Latest Ref Range: 94 - 109 mmol/L 100   Carbon Dioxide Latest Ref Range: 20 - 32 mmol/L 31   Urea Nitrogen Latest Ref Range: 7 - 30 mg/dL 14   Creatinine Latest Ref Range: 0.52 - 1.04 mg/dL 0.73   GFR Estimate Latest Ref Range: >60 mL/min/1.73_m2 88   GFR Estimate If Black Latest Ref Range: >60 mL/min/1.73_m2 >90   Calcium Latest Ref Range: 8.5 - 10.1 mg/dL 9.7   Anion Gap Latest Ref Range: 3 - 14 mmol/L 6   Glucose Latest Ref Range: 70 - 99 mg/dL 91   WBC Latest Ref Range: 4.0 - 11.0 10e9/L 6.8   Hemoglobin Latest Ref Range: 11.7 - 15.7 g/dL 14.6   Hematocrit Latest Ref Range: 35.0 - 47.0 % 45.8   Platelet Count Latest Ref Range: 150 - 450 10e9/L 202   RBC Count Latest Ref Range: 3.8 - 5.2 10e12/L 5.03   MCV Latest Ref Range: 78 - 100 fl 91   MCH Latest Ref Range: 26.5 - 33.0 pg 29.0   MCHC Latest Ref Range: 31.5 - 36.5 g/dL 31.9   RDW Latest Ref Range: 10.0 - 15.0 % 12.8   INR Latest Ref Range: 0.86 - 1.14  1.00   Potassium value noted. Will advised patient to eat potassium rich foods and put in recheck for DOS.     EK17  Sinus rhythm    Stress test 17  1. There is no evidence for significant myocardial ischemia or   infarction.  2.  Normal left ventricular ejection fraction of greater than 70 percent.    CT pelvis 3/1/19  Conclusion: status post instrumented right sacroiliac joint fusion:  1. No evidence of hardware complication.   2. Possible early osseous bridging across the righ sacroiliac joint.     MRI 3/1/19  Conclusion: status post L4-5 instrumented fusion with possible conjoined right L4-L5 nerve roots and following findings compared to 2017:  1. L5-S1 bilateral annular fissuring, slightly more prominent on the right. No focal disc herniation.    2. Slightly increased L1-2 4mm extruded disc herniation, without neural contact or critical stenosis.   Slight progression of mild/moderate bilateral facet degeneration L3-4 and L5-S1    The patient's records and results personally reviewed by this provider.     Outside records reviewed from: care everywhere    ASSESSMENT and PLAN  Inge is a 63 year old woman who is scheduled for Part 1 (supine): ALIF L5-S1, Part 2 (prone): PISF-SPO L5-S1; possible pelvic fixation on 4/22/19 by Dr. Armstrong and Dr. Bowling in treatment of lumbosacral spondylosis with radiculopathy and acquired kyphosis.  PAC referral for risk assessment and optimization for anesthesia with comorbid conditions of PONV, HTN, former smoker, history of CVA with residual hearing loss, GERD, anxiety and osteoporosis:    Pre-operative considerations:  1.  Cardiac:  Functional status- METS 3, secondary to back pain.  Intermediate risk surgery with 0.9% (RCRI #) risk of major adverse cardiac event. Previous EKG 6/23/17 with sinus rhythm and stress echo 7/16/17 with EF >70% and no ischemia. Followed by Dr. Gibson. No further cardiac testing indicated.   2.  Pulm:  Airway feasible.  TOD risk: Low  3. Neuro: History of CVA in 2007. Residual complete left sided hearing loss   4. ENT: Patient with Invisalign braces. Will plan to wear the DOS.   5. GI:    ~ GERD - intermittent symptoms. Will take TUMs as needed. Last time she had symptoms was a week ago. Continue on bowel regimen of senna and probiotic. Risk of PONV score = 4.  If > 2, anti-emetic intervention recommended. Will order scopolamine patch for DOS.   6. Psych: Anxiety with chronic pain and inability to exercise.   7. Musculoskeletal: Osteopenia and osteoarthritis. Consideration for careful positioning and padding given chronic pain. Continue tylenol. Hold ibuprofen 24 hours prior to surgery. She doesn't take Tylenol #3 often but has if needed. Morphine Eq= 4.5mg.     VTE risk:0.5%    Patient was  discussed with Dr Sanchez.    The patient is optimized for their procedure. AVS with information on surgery time/arrival time, meds and NPO status given by nursing staff.        Nichole Wild PA-C  Preoperative Assessment Center  [facility]  Phone: [phone]  Fax: [phone]

## 2019-04-10 NOTE — ANESTHESIA PREPROCEDURE EVALUATION
Anesthesia Pre-Procedure Evaluation    Patient: Inge Son   MRN:     [id number] Gender:   female   Age:    63 year old :      1955        Preoperative Diagnosis: Spondylosis, Radiculopathy, Kyphosis   Procedure(s):  Part 1: (supine) Anterior Lumbar Interbody Fusion Lumbar 5-Sacral 1 and Use Of BMP Small Kit  Part 2 (prone) Posterior Intrumented Spinal Fusion-Escobar Quiroz Osteotomy Lumbar 5-Sacral 1 and Possible Pelvic Fixation     Past Medical History:   Diagnosis Date     Chronic pain      Decreased hearing of left ear      GERD (gastroesophageal reflux disease)     Occasional symptoms, uses TUMS PRN.     H/O CVA (cerebral vascular accident) (H)     Residual hearing loss on the left.     History of skin cancer      HTN (hypertension)      Osteoporosis      PONV (postoperative nausea and vomiting)       Past Surgical History:   Procedure Laterality Date     BUNIONECTOMY Bilateral      HYSTERECTOMY TOTAL ABDOMINAL, BILATERAL SALPINGO-OOPHORECTOMY, COMBINED       OPTICAL TRACKING SYSTEM FUSION SACRAL ILIAC Right 2017    Procedure: OPTICAL TRACKING SYSTEM FUSION SACRAL ILIAC;  Right Minimally Invasive Sacral Iliac Joint Fusion ;  Surgeon: Will Neal MD;  Location: [facility]     ORTHOPEDIC SURGERY      Cyst removed from right knee.     RELEASE TRIGGER FINGER Right      SPINE SURGERY      L 4-5 TLIF          Anesthesia Evaluation     . Pt has had prior anesthetic. Type: General and MAC    History of anesthetic complications   - PONV        ROS/MED HX    ENT/Pulmonary: Comment: Patient with Invisalign braces      (+)tobacco use, Past use , . .   (-) recent URI   Neurologic: Comment: CVA, woke up during the night unable to move her right arm, couldn't hear in the left ear.  Leaning when got up to go to the bathroom.  Went back to sleep.  When she got up the following AM, symptoms had resolved, with the exception of the hearing loss.  Evaluated and it was eventually discovered that she had a  small stroke.  Continues to have complete hearing in the left ear.    (+)CVA date: 2007 with deficits- Hearing loss in the left ear.  ,    (-) TIA   Cardiovascular:     (+) hypertension-range: 120's/70's, ---. : . . . :. . Previous cardiac testing date:results:Stress Testdate:7/16/17 results:ECG reviewed date:6/23/17 results:SR date: results:         (-) taking anticoagulants/antiplatelets, DENNIS and orthopnea/PND   METS/Exercise Tolerance: Comment: Activity is limited by chronic back pain.   3 - Able to walk 1-2 blocks without stopping   Hematologic:  - neg hematologic  ROS      (-) history of blood clots and History of Transfusion   Musculoskeletal:  - neg musculoskeletal ROS       GI/Hepatic: Comment: Occasional GERD symptoms, takes TUMS 1-2 times a week.      (+) GERD Other,       Renal/Genitourinary:  - ROS Renal section negative       Endo:  - neg endo ROS       Psychiatric: Comment: Anxiety regarding chronic pain and inability to do activities.      (+) psychiatric history anxiety      Infectious Disease:  - neg infectious disease ROS       Malignancy:   (+) Malignancy History of Skin  Skin CA Remission status post Surgery,         Other:    (+) H/O Chronic Pain,H/O chronic opiod use , no other significant disability                        PHYSICAL EXAM:   Mental Status/Neuro: A/A/O   Airway: Facies: Feasible  Mallampati: I  Mouth/Opening: Full  TM distance: > 6 cm  Neck ROM: Full   Respiratory: Auscultation: CTAB     Resp. Rate: Normal     Resp. Effort: Normal      CV: Rhythm: Regular  Rate: Age appropriate  Heart: Normal Sounds   Comments:      Dental: Normal                  Results for BRISEYDA MERCHANT (MRN [id number]) as of 4/10/2019 16:39   Ref. Range 4/10/2019 15:00   Sodium Latest Ref Range: 133 - 144 mmol/L 137   Potassium Latest Ref Range: 3.4 - 5.3 mmol/L 3.3 (L)   Chloride Latest Ref Range: 94 - 109 mmol/L 100   Carbon Dioxide Latest Ref Range: 20 - 32 mmol/L 31   Urea Nitrogen Latest Ref Range: 7 -  "30 mg/dL 14   Creatinine Latest Ref Range: 0.52 - 1.04 mg/dL 0.73   GFR Estimate Latest Ref Range: >60 mL/min/1.73_m2 88   GFR Estimate If Black Latest Ref Range: >60 mL/min/1.73_m2 >90   Calcium Latest Ref Range: 8.5 - 10.1 mg/dL 9.7   Anion Gap Latest Ref Range: 3 - 14 mmol/L 6   Glucose Latest Ref Range: 70 - 99 mg/dL 91   WBC Latest Ref Range: 4.0 - 11.0 10e9/L 6.8   Hemoglobin Latest Ref Range: 11.7 - 15.7 g/dL 14.6   Hematocrit Latest Ref Range: 35.0 - 47.0 % 45.8   Platelet Count Latest Ref Range: 150 - 450 10e9/L 202   RBC Count Latest Ref Range: 3.8 - 5.2 10e12/L 5.03   MCV Latest Ref Range: 78 - 100 fl 91   MCH Latest Ref Range: 26.5 - 33.0 pg 29.0   MCHC Latest Ref Range: 31.5 - 36.5 g/dL 31.9   RDW Latest Ref Range: 10.0 - 15.0 % 12.8   INR Latest Ref Range: 0.86 - 1.14  1.00     Potassium value noted. Will advised patient to eat potassium rich foods and put in recheck for DOS.     Preop Vitals  BP Readings from Last 3 Encounters:   12/12/17 117/68   11/22/17 157/78    Pulse Readings from Last 3 Encounters:   12/12/17 90   11/22/17 61      Resp Readings from Last 3 Encounters:   12/12/17 18   11/22/17 16    SpO2 Readings from Last 3 Encounters:   12/12/17 96%   11/22/17 96%      Temp Readings from Last 1 Encounters:   12/12/17 96.8  F (36  C) (Oral)    Ht Readings from Last 1 Encounters:   03/12/19 1.626 m (5' 4\")      Wt Readings from Last 1 Encounters:   03/12/19 60.8 kg (134 lb)    Estimated body mass index is 23 kg/m  as calculated from the following:    Height as of 3/12/19: 1.626 m (5' 4\").    Weight as of 3/12/19: 60.8 kg (134 lb).     LDA:            Assessment:   ASA SCORE: 3       Documentation: H&P complete; Preop Testing complete; Consents complete   Proceeding: Proceed without further delay  Tobacco Use:  NO Active use of Tobacco/UNKNOWN Tobacco use status     Plan:   Anes. Type:  General   Pre-Induction: Midazolam IV; Acetaminophen PO; Gabapentin PO   Induction:  IV (Standard)   Airway: " Oral ETT; CMAC/VL   Access/Monitoring: PIV; A-Line   Maintenance: Balanced   Emergence: Procedure Site   Logistics: Observation/Admission     Postop Pain/Sedation Strategy:  Standard-Options: Opioids PRN     PONV Management:  Adult Risk Factors: Female, H/o PONV or Motion Sickness, Non-Smoker, Postop Opioids  Prevention: Ondansetron; Dexamethasone     CONSENT: Direct conversation   Plan and risks discussed with: Patient   Blood Products: Consented (ALL Blood Products)                  PAC Discussion and Assessment    ASA Classification: 2  Case is suitable for: [place]  Anesthetic techniques and relevant risks discussed: GA  Invasive monitoring and risk discussed:   Types:   Possibility and Risk of blood transfusion discussed:   NPO instructions given:   Additional anesthetic preparation and risks discussed:   Needs early admission to pre-op area:   Other:     PAC Resident/NP Anesthesia Assessment:  Inge is a 63 year old woman who is scheduled for Part 1 (supine): ALIF L5-S1, Part 2 (prone): PISF-SPO L5-S1; possible pelvic fixation on 4/22/19 by Dr. Armstrong and Dr. Bowling in treatment of lumbosacral spondylosis with radiculopathy and acquired kyphosis.  PAC referral for risk assessment and optimization for anesthesia with comorbid conditions of PONV, HTN, former smoker, history of CVA with residual hearing loss, GERD, anxiety and osteoporosis:    Pre-operative considerations:  1.  Cardiac:  Functional status- METS 3, secondary to back pain.  Intermediate risk surgery with 0.9% (RCRI #) risk of major adverse cardiac event. Previous EKG 6/23/17 with sinus rhythm and stress echo 7/16/17 with EF >70% and no ischemia. Followed by Dr. Gibson. No further cardiac testing indicated.   2.  Pulm:  Airway feasible.  TOD risk: Low  3. Neuro: History of CVA in 2007. Residual complete left sided hearing loss   4. ENT: Patient with Invisalign braces. Will plan to wear the DOS.   5. GI:    ~ GERD - intermittent symptoms. Will  take TUMs as needed. Last time she had symptoms was a week ago. Continue on bowel regimen of senna and probiotic. Risk of PONV score = 4.  If > 2, anti-emetic intervention recommended. Will order scopolamine patch for DOS.   6. Psych: Anxiety with chronic pain and inability to exercise.   7. Musculoskeletal: Osteopenia and osteoarthritis. Consideration for careful positioning and padding given chronic pain. Continue tylenol. Hold ibuprofen 24 hours prior to surgery. She doesn't take Tylenol #3 often but has if needed. Morphine Eq= 4.5mg.     VTE risk:0.5%    Patient is optimized and is acceptable candidate for the proposed procedure.  No further diagnostic evaluation is needed.     Patient also evaluated by Dr. Sanchez . See recommendations below.     For further details of assessment, testing, and physical exam please see H and P completed on same date.    Nichole Wild PA-C        Mid-Level Provider/Resident: Nichole Wild PA-C  Date: 4/10/19  Time:     Attending Anesthesiologist Anesthesia Assessment:        Anesthesiologist:   Date:   Time:   Pass/Fail:   Disposition:     PAC Pharmacist Assessment:        Pharmacist:   Date:   Time:        Nichole Wild PA-C

## 2019-04-10 NOTE — ADDENDUM NOTE
Addendum  created 04/10/19 1643 by Nichole Wild PA-C    Order list changed, Order sets accessed, Sign clinical note

## 2019-04-10 NOTE — PATIENT INSTRUCTIONS
Preparing for Your Surgery      Name:  Inge Son   MRN:  [id number]   :  1955   Today's Date:  4/10/2019     Arriving for surgery:  Surgery date:  19  Arrival time:  09:30 am  Please come to:     [facility] [address]  [address] [place]  [address]    - parking is available in front of [facility] from 5:15AM to 8:00PM. If you prefer, park your car in the Green Lot.    -Proceed to the 3rd floor, check in at the Adult Surgery Waiting Lounge. [phone]    If an escort is needed stop at the Information Desk in the lobby. Inform the information person that you are here for surgery. An escort to the Adult Surgery Waiting Lounge will be provided.        What can I eat or drink?  -  You may have solid food or milk products until 8 hours prior to your surgery.  -  You may have water, apple juice or 7up/Sprite until 2 hours prior to your surgery.    Which medicines can I take?  Stop Aspirin, vitamins and supplements (fish oil, multivitamin) one week prior to surgery.  Hold Ibuprofen for 24 hours and/or Naproxen for 48 hours prior to surgery.   -  Do NOT take these medications in the morning, the day of surgery:  Hydrodiuril + potassium if normally taken in the morning.    -  Please take these medications the day of surgery:  Tylenol products if needed; take all other medications normally taken in the morning.    How do I prepare myself?  -  Take two showers: one the night before surgery; and one the morning of surgery.         Use Scrubcare or Hibiclens to wash from neck down.  You may use your own     shampoo and conditioner. No other hair products.   -  Do NOT use lotion, powder, deodorant, or antiperspirant the day of your surgery.  -  Do NOT wear any makeup, fingernail polish or jewelry.  - Do not bring your own medications to the hospital, except for inhalers and eye   drops.  -  Bring your ID and insurance card.    Questions or  Concerns:  -If you have questions or concerns regarding the day of surgery, please call [phone].     -For questions after surgery please call your surgeons office.

## 2019-04-11 ASSESSMENT — PATIENT HEALTH QUESTIONNAIRE - PHQ9: SUM OF ALL RESPONSES TO PHQ QUESTIONS 1-9: 8

## 2019-04-16 PROBLEM — Z12.12 SCREENING FOR COLORECTAL CANCER: Status: ACTIVE | Noted: 2019-04-16

## 2019-04-16 PROBLEM — K57.30 DIVERTICULOSIS OF COLON: Status: ACTIVE | Noted: 2019-04-16

## 2019-04-16 PROBLEM — I16.0 HYPERTENSIVE URGENCY: Status: ACTIVE | Noted: 2017-06-21

## 2019-04-16 PROBLEM — Z12.11 SCREENING FOR COLORECTAL CANCER: Status: ACTIVE | Noted: 2019-04-16

## 2019-04-22 ENCOUNTER — HOSPITAL ENCOUNTER (INPATIENT)
Facility: CLINIC | Age: 64
LOS: 4 days | Discharge: HOME | DRG: 454 | End: 2019-04-26
Attending: ORTHOPAEDIC SURGERY | Admitting: ORTHOPAEDIC SURGERY
Payer: COMMERCIAL

## 2019-04-22 ENCOUNTER — ANESTHESIA (OUTPATIENT)
Dept: SURGERY | Facility: CLINIC | Age: 64
DRG: 454 | End: 2019-04-22
Payer: COMMERCIAL

## 2019-04-22 ENCOUNTER — APPOINTMENT (OUTPATIENT)
Dept: GENERAL RADIOLOGY | Facility: CLINIC | Age: 64
DRG: 454 | End: 2019-04-22
Attending: ORTHOPAEDIC SURGERY
Payer: COMMERCIAL

## 2019-04-22 DIAGNOSIS — Z98.890 HISTORY OF LUMBOSACRAL SPINE SURGERY: Primary | ICD-10-CM

## 2019-04-22 LAB
ABO + RH BLD: NORMAL
ABO + RH BLD: NORMAL
BASE DEFICIT BLDA-SCNC: 1.8 MMOL/L
BLD GP AB SCN SERPL QL: NORMAL
BLOOD BANK CMNT PATIENT-IMP: NORMAL
BLOOD BANK CMNT PATIENT-IMP: NORMAL
CA-I BLD-MCNC: 4.4 MG/DL (ref 4.4–5.2)
GLUCOSE BLD-MCNC: 138 MG/DL (ref 70–99)
GLUCOSE SERPL-MCNC: 101 MG/DL (ref 70–99)
HCO3 BLD-SCNC: 24 MMOL/L (ref 21–28)
HGB BLD-MCNC: 12.2 G/DL (ref 11.7–15.7)
O2/TOTAL GAS SETTING VFR VENT: 40 %
PCO2 BLD: 44 MM HG (ref 35–45)
PH BLD: 7.35 PH (ref 7.35–7.45)
PO2 BLD: 168 MM HG (ref 80–105)
POTASSIUM BLD-SCNC: 3 MMOL/L (ref 3.4–5.3)
POTASSIUM SERPL-SCNC: 3.4 MMOL/L (ref 3.4–5.3)
POTASSIUM SERPL-SCNC: 4.8 MMOL/L (ref 3.4–5.3)
SODIUM BLD-SCNC: 139 MMOL/L (ref 133–144)
SPECIMEN EXP DATE BLD: NORMAL

## 2019-04-22 PROCEDURE — P9041 ALBUMIN (HUMAN),5%, 50ML: HCPCS | Performed by: NURSE ANESTHETIST, CERTIFIED REGISTERED

## 2019-04-22 PROCEDURE — 25000132 ZZH RX MED GY IP 250 OP 250 PS 637: Performed by: PHYSICIAN ASSISTANT

## 2019-04-22 PROCEDURE — 71000015 ZZH RECOVERY PHASE 1 LEVEL 2 EA ADDTL HR: Performed by: ORTHOPAEDIC SURGERY

## 2019-04-22 PROCEDURE — 25800030 ZZH RX IP 258 OP 636: Performed by: NURSE ANESTHETIST, CERTIFIED REGISTERED

## 2019-04-22 PROCEDURE — 25000128 H RX IP 250 OP 636: Performed by: STUDENT IN AN ORGANIZED HEALTH CARE EDUCATION/TRAINING PROGRAM

## 2019-04-22 PROCEDURE — 8E0WXBF COMPUTER ASSISTED PROCEDURE OF TRUNK REGION, WITH FLUOROSCOPY: ICD-10-PCS | Performed by: ORTHOPAEDIC SURGERY

## 2019-04-22 PROCEDURE — 40000275 ZZH STATISTIC RCP TIME EA 10 MIN

## 2019-04-22 PROCEDURE — 82803 BLOOD GASES ANY COMBINATION: CPT | Performed by: ORTHOPAEDIC SURGERY

## 2019-04-22 PROCEDURE — 0SB20ZZ EXCISION OF LUMBAR VERTEBRAL DISC, OPEN APPROACH: ICD-10-PCS | Performed by: ORTHOPAEDIC SURGERY

## 2019-04-22 PROCEDURE — 84295 ASSAY OF SERUM SODIUM: CPT | Performed by: ORTHOPAEDIC SURGERY

## 2019-04-22 PROCEDURE — 25000128 H RX IP 250 OP 636: Performed by: NURSE ANESTHETIST, CERTIFIED REGISTERED

## 2019-04-22 PROCEDURE — 84132 ASSAY OF SERUM POTASSIUM: CPT | Performed by: ANESTHESIOLOGY

## 2019-04-22 PROCEDURE — 37000008 ZZH ANESTHESIA TECHNICAL FEE, 1ST 30 MIN: Performed by: ORTHOPAEDIC SURGERY

## 2019-04-22 PROCEDURE — 40000170 ZZH STATISTIC PRE-PROCEDURE ASSESSMENT II: Performed by: ORTHOPAEDIC SURGERY

## 2019-04-22 PROCEDURE — 25000125 ZZHC RX 250: Performed by: NURSE ANESTHETIST, CERTIFIED REGISTERED

## 2019-04-22 PROCEDURE — 25800030 ZZH RX IP 258 OP 636: Performed by: PHYSICIAN ASSISTANT

## 2019-04-22 PROCEDURE — 25800030 ZZH RX IP 258 OP 636: Performed by: ANESTHESIOLOGY

## 2019-04-22 PROCEDURE — 25000125 ZZHC RX 250: Performed by: PHYSICIAN ASSISTANT

## 2019-04-22 PROCEDURE — 0SB40ZZ EXCISION OF LUMBOSACRAL DISC, OPEN APPROACH: ICD-10-PCS | Performed by: ORTHOPAEDIC SURGERY

## 2019-04-22 PROCEDURE — 82330 ASSAY OF CALCIUM: CPT | Performed by: ORTHOPAEDIC SURGERY

## 2019-04-22 PROCEDURE — 25000128 H RX IP 250 OP 636: Performed by: ANESTHESIOLOGY

## 2019-04-22 PROCEDURE — 25000128 H RX IP 250 OP 636: Performed by: PHYSICIAN ASSISTANT

## 2019-04-22 PROCEDURE — 71000014 ZZH RECOVERY PHASE 1 LEVEL 2 FIRST HR: Performed by: ORTHOPAEDIC SURGERY

## 2019-04-22 PROCEDURE — 40000277 XR SURGERY CARM FLUORO LESS THAN 5 MIN W STILLS

## 2019-04-22 PROCEDURE — C1713 ANCHOR/SCREW BN/BN,TIS/BN: HCPCS | Performed by: ORTHOPAEDIC SURGERY

## 2019-04-22 PROCEDURE — 0QH204Z INSERTION OF INTERNAL FIXATION DEVICE INTO RIGHT PELVIC BONE, OPEN APPROACH: ICD-10-PCS | Performed by: ORTHOPAEDIC SURGERY

## 2019-04-22 PROCEDURE — 27210794 ZZH OR GENERAL SUPPLY STERILE: Performed by: ORTHOPAEDIC SURGERY

## 2019-04-22 PROCEDURE — 25000125 ZZHC RX 250: Performed by: ORTHOPAEDIC SURGERY

## 2019-04-22 PROCEDURE — 25000125 ZZHC RX 250: Performed by: ANESTHESIOLOGY

## 2019-04-22 PROCEDURE — 82947 ASSAY GLUCOSE BLOOD QUANT: CPT | Performed by: ORTHOPAEDIC SURGERY

## 2019-04-22 PROCEDURE — 27211024 ZZHC OR SUPPLY OTHER OPNP: Performed by: ORTHOPAEDIC SURGERY

## 2019-04-22 PROCEDURE — C1763 CONN TISS, NON-HUMAN: HCPCS | Performed by: ORTHOPAEDIC SURGERY

## 2019-04-22 PROCEDURE — 84132 ASSAY OF SERUM POTASSIUM: CPT | Performed by: PHYSICIAN ASSISTANT

## 2019-04-22 PROCEDURE — 0SG0071 FUSION OF LUMBAR VERTEBRAL JOINT WITH AUTOLOGOUS TISSUE SUBSTITUTE, POSTERIOR APPROACH, POSTERIOR COLUMN, OPEN APPROACH: ICD-10-PCS | Performed by: ORTHOPAEDIC SURGERY

## 2019-04-22 PROCEDURE — 00UT07Z SUPPLEMENT SPINAL MENINGES WITH AUTOLOGOUS TISSUE SUBSTITUTE, OPEN APPROACH: ICD-10-PCS | Performed by: ORTHOPAEDIC SURGERY

## 2019-04-22 PROCEDURE — L8699 PROSTHETIC IMPLANT NOS: HCPCS | Performed by: ORTHOPAEDIC SURGERY

## 2019-04-22 PROCEDURE — 0SG30A0 FUSION OF LUMBOSACRAL JOINT WITH INTERBODY FUSION DEVICE, ANTERIOR APPROACH, ANTERIOR COLUMN, OPEN APPROACH: ICD-10-PCS | Performed by: ORTHOPAEDIC SURGERY

## 2019-04-22 PROCEDURE — 25000566 ZZH SEVOFLURANE, EA 15 MIN: Performed by: ORTHOPAEDIC SURGERY

## 2019-04-22 PROCEDURE — 27210995 ZZH RX 272: Performed by: ORTHOPAEDIC SURGERY

## 2019-04-22 PROCEDURE — 12000001 ZZH R&B MED SURG/OB UMMC

## 2019-04-22 PROCEDURE — 84132 ASSAY OF SERUM POTASSIUM: CPT | Performed by: ORTHOPAEDIC SURGERY

## 2019-04-22 PROCEDURE — 37000009 ZZH ANESTHESIA TECHNICAL FEE, EACH ADDTL 15 MIN: Performed by: ORTHOPAEDIC SURGERY

## 2019-04-22 PROCEDURE — 36000078 ZZH SURGERY LEVEL 6 W FLUORO 1ST 30 MIN - UMMC: Performed by: ORTHOPAEDIC SURGERY

## 2019-04-22 PROCEDURE — C1762 CONN TISS, HUMAN(INC FASCIA): HCPCS | Performed by: ORTHOPAEDIC SURGERY

## 2019-04-22 PROCEDURE — 25000128 H RX IP 250 OP 636: Performed by: ORTHOPAEDIC SURGERY

## 2019-04-22 PROCEDURE — 40000014 ZZH STATISTIC ARTERIAL MONITORING DAILY

## 2019-04-22 PROCEDURE — 40000278 XR SURGERY CARM FLUORO LESS THAN 5 MIN: Mod: TC

## 2019-04-22 PROCEDURE — 0SG3071 FUSION OF LUMBOSACRAL JOINT WITH AUTOLOGOUS TISSUE SUBSTITUTE, POSTERIOR APPROACH, POSTERIOR COLUMN, OPEN APPROACH: ICD-10-PCS | Performed by: ORTHOPAEDIC SURGERY

## 2019-04-22 PROCEDURE — 0SP004Z REMOVAL OF INTERNAL FIXATION DEVICE FROM LUMBAR VERTEBRAL JOINT, OPEN APPROACH: ICD-10-PCS | Performed by: ORTHOPAEDIC SURGERY

## 2019-04-22 PROCEDURE — 36000076 ZZH SURGERY LEVEL 6 EA 15 ADDTL MIN - UMMC: Performed by: ORTHOPAEDIC SURGERY

## 2019-04-22 PROCEDURE — 82947 ASSAY GLUCOSE BLOOD QUANT: CPT | Performed by: PHYSICIAN ASSISTANT

## 2019-04-22 DEVICE — IMP SCR NUVASIVE RELINE-O 7.5X45MM S2 POLYAXIAL 13017545: Type: IMPLANTABLE DEVICE | Site: SPINE LUMBAR | Status: FUNCTIONAL

## 2019-04-22 DEVICE — GRAFT BONE MAGNIFUSE 1CMX5CM 7509115: Type: IMPLANTABLE DEVICE | Site: SPINE LUMBAR | Status: FUNCTIONAL

## 2019-04-22 DEVICE — IMPLANTABLE DEVICE: Type: IMPLANTABLE DEVICE | Site: SPINE LUMBAR | Status: FUNCTIONAL

## 2019-04-22 DEVICE — GRAFT DURAGEN 1X1" ID-110: Type: IMPLANTABLE DEVICE | Site: SPINE LUMBAR | Status: FUNCTIONAL

## 2019-04-22 DEVICE — GRAFT BONE CRUSH CANC 30ML 400080: Type: IMPLANTABLE DEVICE | Site: SPINE LUMBAR | Status: FUNCTIONAL

## 2019-04-22 DEVICE — GRAFT BONE INFUSE BMP SM 7510200: Type: IMPLANTABLE DEVICE | Site: SPINE LUMBAR | Status: FUNCTIONAL

## 2019-04-22 DEVICE — GRAFT BONE MAGNIFUSE 1CMX5CM 7509215: Type: IMPLANTABLE DEVICE | Site: SPINE LUMBAR | Status: FUNCTIONAL

## 2019-04-22 RX ORDER — VANCOMYCIN HYDROCHLORIDE 1 G/20ML
INJECTION, POWDER, LYOPHILIZED, FOR SOLUTION INTRAVENOUS PRN
Status: DISCONTINUED | OUTPATIENT
Start: 2019-04-22 | End: 2019-04-22 | Stop reason: HOSPADM

## 2019-04-22 RX ORDER — SODIUM CHLORIDE, SODIUM LACTATE, POTASSIUM CHLORIDE, CALCIUM CHLORIDE 600; 310; 30; 20 MG/100ML; MG/100ML; MG/100ML; MG/100ML
INJECTION, SOLUTION INTRAVENOUS CONTINUOUS PRN
Status: DISCONTINUED | OUTPATIENT
Start: 2019-04-22 | End: 2019-04-22

## 2019-04-22 RX ORDER — ONDANSETRON 2 MG/ML
4 INJECTION INTRAMUSCULAR; INTRAVENOUS EVERY 30 MIN PRN
Status: DISCONTINUED | OUTPATIENT
Start: 2019-04-22 | End: 2019-04-22 | Stop reason: HOSPADM

## 2019-04-22 RX ORDER — ACETAMINOPHEN 325 MG/1
975 TABLET ORAL ONCE
Status: COMPLETED | OUTPATIENT
Start: 2019-04-22 | End: 2019-04-22

## 2019-04-22 RX ORDER — ACETAMINOPHEN 325 MG/1
975 TABLET ORAL EVERY 8 HOURS
Status: DISPENSED | OUTPATIENT
Start: 2019-04-22 | End: 2019-04-25

## 2019-04-22 RX ORDER — POLYETHYLENE GLYCOL 3350 17 G/17G
17 POWDER, FOR SOLUTION ORAL DAILY
Status: DISCONTINUED | OUTPATIENT
Start: 2019-04-23 | End: 2019-04-26 | Stop reason: HOSPADM

## 2019-04-22 RX ORDER — LIDOCAINE 40 MG/G
CREAM TOPICAL
Status: DISCONTINUED | OUTPATIENT
Start: 2019-04-22 | End: 2019-04-26 | Stop reason: HOSPADM

## 2019-04-22 RX ORDER — ACETAMINOPHEN 325 MG/1
650 TABLET ORAL EVERY 4 HOURS PRN
Status: DISCONTINUED | OUTPATIENT
Start: 2019-04-25 | End: 2019-04-26 | Stop reason: HOSPADM

## 2019-04-22 RX ORDER — DEXTROSE MONOHYDRATE, SODIUM CHLORIDE, AND POTASSIUM CHLORIDE 50; 1.49; 4.5 G/1000ML; G/1000ML; G/1000ML
INJECTION, SOLUTION INTRAVENOUS CONTINUOUS
Status: DISCONTINUED | OUTPATIENT
Start: 2019-04-22 | End: 2019-04-24

## 2019-04-22 RX ORDER — SCOLOPAMINE TRANSDERMAL SYSTEM 1 MG/1
1 PATCH, EXTENDED RELEASE TRANSDERMAL ONCE
Status: COMPLETED | OUTPATIENT
Start: 2019-04-22 | End: 2019-04-22

## 2019-04-22 RX ORDER — AMOXICILLIN 250 MG
1-2 CAPSULE ORAL 2 TIMES DAILY
Status: DISCONTINUED | OUTPATIENT
Start: 2019-04-22 | End: 2019-04-26 | Stop reason: HOSPADM

## 2019-04-22 RX ORDER — ONDANSETRON 2 MG/ML
INJECTION INTRAMUSCULAR; INTRAVENOUS PRN
Status: DISCONTINUED | OUTPATIENT
Start: 2019-04-22 | End: 2019-04-22

## 2019-04-22 RX ORDER — DIPHENHYDRAMINE HCL 25 MG
25 CAPSULE ORAL EVERY 6 HOURS PRN
Status: DISCONTINUED | OUTPATIENT
Start: 2019-04-22 | End: 2019-04-26 | Stop reason: HOSPADM

## 2019-04-22 RX ORDER — GABAPENTIN 100 MG/1
300 CAPSULE ORAL
Status: COMPLETED | OUTPATIENT
Start: 2019-04-22 | End: 2019-04-22

## 2019-04-22 RX ORDER — CEFAZOLIN SODIUM 2 G/100ML
2 INJECTION, SOLUTION INTRAVENOUS
Status: COMPLETED | OUTPATIENT
Start: 2019-04-22 | End: 2019-04-22

## 2019-04-22 RX ORDER — MAGNESIUM HYDROXIDE 1200 MG/15ML
LIQUID ORAL PRN
Status: DISCONTINUED | OUTPATIENT
Start: 2019-04-22 | End: 2019-04-22 | Stop reason: HOSPADM

## 2019-04-22 RX ORDER — FENTANYL CITRATE 50 UG/ML
25-50 INJECTION, SOLUTION INTRAMUSCULAR; INTRAVENOUS EVERY 5 MIN PRN
Status: DISCONTINUED | OUTPATIENT
Start: 2019-04-22 | End: 2019-04-22 | Stop reason: HOSPADM

## 2019-04-22 RX ORDER — CEFAZOLIN SODIUM 1 G/3ML
1 INJECTION, POWDER, FOR SOLUTION INTRAMUSCULAR; INTRAVENOUS EVERY 8 HOURS
Status: COMPLETED | OUTPATIENT
Start: 2019-04-23 | End: 2019-04-23

## 2019-04-22 RX ORDER — POTASSIUM CHLORIDE 750 MG/1
20-40 TABLET, EXTENDED RELEASE ORAL
Status: DISCONTINUED | OUTPATIENT
Start: 2019-04-22 | End: 2019-04-26 | Stop reason: HOSPADM

## 2019-04-22 RX ORDER — BISACODYL 10 MG
10 SUPPOSITORY, RECTAL RECTAL DAILY PRN
Status: DISCONTINUED | OUTPATIENT
Start: 2019-04-22 | End: 2019-04-26 | Stop reason: HOSPADM

## 2019-04-22 RX ORDER — PROPOFOL 10 MG/ML
INJECTION, EMULSION INTRAVENOUS PRN
Status: DISCONTINUED | OUTPATIENT
Start: 2019-04-22 | End: 2019-04-22

## 2019-04-22 RX ORDER — OXYCODONE HYDROCHLORIDE 5 MG/1
5-10 TABLET ORAL
Status: DISCONTINUED | OUTPATIENT
Start: 2019-04-22 | End: 2019-04-26 | Stop reason: HOSPADM

## 2019-04-22 RX ORDER — DIAZEPAM 5 MG
5 TABLET ORAL EVERY 6 HOURS PRN
Status: DISCONTINUED | OUTPATIENT
Start: 2019-04-22 | End: 2019-04-26 | Stop reason: HOSPADM

## 2019-04-22 RX ORDER — OMEGA-3S/DHA/EPA/FISH OIL 1000-1400
2 CAPSULE,DELAYED RELEASE (ENTERIC COATED) ORAL EVERY MORNING
Status: DISCONTINUED | OUTPATIENT
Start: 2019-04-23 | End: 2019-04-22 | Stop reason: CLARIF

## 2019-04-22 RX ORDER — ONDANSETRON 4 MG/1
4 TABLET, ORALLY DISINTEGRATING ORAL EVERY 30 MIN PRN
Status: DISCONTINUED | OUTPATIENT
Start: 2019-04-22 | End: 2019-04-22 | Stop reason: HOSPADM

## 2019-04-22 RX ORDER — ALBUTEROL SULFATE 90 UG/1
2 AEROSOL, METERED RESPIRATORY (INHALATION) EVERY 4 HOURS PRN
Status: DISCONTINUED | OUTPATIENT
Start: 2019-04-22 | End: 2019-04-26 | Stop reason: HOSPADM

## 2019-04-22 RX ORDER — NALOXONE HYDROCHLORIDE 0.4 MG/ML
.1-.4 INJECTION, SOLUTION INTRAMUSCULAR; INTRAVENOUS; SUBCUTANEOUS
Status: DISCONTINUED | OUTPATIENT
Start: 2019-04-22 | End: 2019-04-26 | Stop reason: HOSPADM

## 2019-04-22 RX ORDER — DIPHENHYDRAMINE HYDROCHLORIDE 50 MG/ML
25 INJECTION INTRAMUSCULAR; INTRAVENOUS EVERY 6 HOURS PRN
Status: DISCONTINUED | OUTPATIENT
Start: 2019-04-22 | End: 2019-04-26 | Stop reason: HOSPADM

## 2019-04-22 RX ORDER — ONDANSETRON 2 MG/ML
4 INJECTION INTRAMUSCULAR; INTRAVENOUS EVERY 6 HOURS PRN
Status: DISCONTINUED | OUTPATIENT
Start: 2019-04-22 | End: 2019-04-26 | Stop reason: HOSPADM

## 2019-04-22 RX ORDER — CEFAZOLIN SODIUM 1 G/3ML
1 INJECTION, POWDER, FOR SOLUTION INTRAMUSCULAR; INTRAVENOUS SEE ADMIN INSTRUCTIONS
Status: DISCONTINUED | OUTPATIENT
Start: 2019-04-22 | End: 2019-04-22 | Stop reason: HOSPADM

## 2019-04-22 RX ORDER — POTASSIUM CHLORIDE 1.5 G/1.58G
20-40 POWDER, FOR SOLUTION ORAL
Status: DISCONTINUED | OUTPATIENT
Start: 2019-04-22 | End: 2019-04-26 | Stop reason: HOSPADM

## 2019-04-22 RX ORDER — PROPOFOL 10 MG/ML
INJECTION, EMULSION INTRAVENOUS CONTINUOUS PRN
Status: DISCONTINUED | OUTPATIENT
Start: 2019-04-22 | End: 2019-04-22

## 2019-04-22 RX ORDER — NALOXONE HYDROCHLORIDE 0.4 MG/ML
.1-.4 INJECTION, SOLUTION INTRAMUSCULAR; INTRAVENOUS; SUBCUTANEOUS
Status: DISCONTINUED | OUTPATIENT
Start: 2019-04-22 | End: 2019-04-22 | Stop reason: HOSPADM

## 2019-04-22 RX ORDER — GABAPENTIN 100 MG/1
300 CAPSULE ORAL 2 TIMES DAILY
Status: DISPENSED | OUTPATIENT
Start: 2019-04-23 | End: 2019-04-25

## 2019-04-22 RX ORDER — DEXAMETHASONE SODIUM PHOSPHATE 4 MG/ML
INJECTION, SOLUTION INTRA-ARTICULAR; INTRALESIONAL; INTRAMUSCULAR; INTRAVENOUS; SOFT TISSUE PRN
Status: DISCONTINUED | OUTPATIENT
Start: 2019-04-22 | End: 2019-04-22

## 2019-04-22 RX ORDER — MEPERIDINE HYDROCHLORIDE 25 MG/ML
12.5 INJECTION INTRAMUSCULAR; INTRAVENOUS; SUBCUTANEOUS
Status: DISCONTINUED | OUTPATIENT
Start: 2019-04-22 | End: 2019-04-22 | Stop reason: HOSPADM

## 2019-04-22 RX ORDER — CALCIUM CARBONATE 500 MG/1
1000 TABLET, CHEWABLE ORAL 4 TIMES DAILY PRN
Status: DISCONTINUED | OUTPATIENT
Start: 2019-04-22 | End: 2019-04-26 | Stop reason: HOSPADM

## 2019-04-22 RX ORDER — LANOLIN ALCOHOL/MO/W.PET/CERES
1000 CREAM (GRAM) TOPICAL
Status: DISCONTINUED | OUTPATIENT
Start: 2019-04-22 | End: 2019-04-26 | Stop reason: HOSPADM

## 2019-04-22 RX ORDER — BUPIVACAINE HYDROCHLORIDE AND EPINEPHRINE 2.5; 5 MG/ML; UG/ML
INJECTION, SOLUTION INFILTRATION; PERINEURAL PRN
Status: DISCONTINUED | OUTPATIENT
Start: 2019-04-22 | End: 2019-04-22 | Stop reason: HOSPADM

## 2019-04-22 RX ORDER — CALCIUM CHLORIDE 100 MG/ML
INJECTION INTRAVENOUS; INTRAVENTRICULAR PRN
Status: DISCONTINUED | OUTPATIENT
Start: 2019-04-22 | End: 2019-04-22

## 2019-04-22 RX ORDER — FENTANYL CITRATE 50 UG/ML
INJECTION, SOLUTION INTRAMUSCULAR; INTRAVENOUS PRN
Status: DISCONTINUED | OUTPATIENT
Start: 2019-04-22 | End: 2019-04-22

## 2019-04-22 RX ORDER — HYDROMORPHONE HYDROCHLORIDE 1 MG/ML
.3-.5 INJECTION, SOLUTION INTRAMUSCULAR; INTRAVENOUS; SUBCUTANEOUS EVERY 10 MIN PRN
Status: DISCONTINUED | OUTPATIENT
Start: 2019-04-22 | End: 2019-04-22 | Stop reason: HOSPADM

## 2019-04-22 RX ORDER — HYDROMORPHONE HYDROCHLORIDE 1 MG/ML
.3-.5 INJECTION, SOLUTION INTRAMUSCULAR; INTRAVENOUS; SUBCUTANEOUS
Status: DISCONTINUED | OUTPATIENT
Start: 2019-04-22 | End: 2019-04-26 | Stop reason: HOSPADM

## 2019-04-22 RX ORDER — CALCIUM CARBONATE 500 MG/1
500 TABLET, CHEWABLE ORAL 3 TIMES DAILY PRN
Status: DISCONTINUED | OUTPATIENT
Start: 2019-04-22 | End: 2019-04-22 | Stop reason: ALTCHOICE

## 2019-04-22 RX ORDER — SODIUM CHLORIDE 9 MG/ML
INJECTION, SOLUTION INTRAVENOUS CONTINUOUS PRN
Status: DISCONTINUED | OUTPATIENT
Start: 2019-04-22 | End: 2019-04-22

## 2019-04-22 RX ORDER — ONDANSETRON 4 MG/1
4 TABLET, ORALLY DISINTEGRATING ORAL EVERY 6 HOURS PRN
Status: DISCONTINUED | OUTPATIENT
Start: 2019-04-22 | End: 2019-04-26 | Stop reason: HOSPADM

## 2019-04-22 RX ORDER — EPHEDRINE SULFATE 50 MG/ML
INJECTION, SOLUTION INTRAMUSCULAR; INTRAVENOUS; SUBCUTANEOUS PRN
Status: DISCONTINUED | OUTPATIENT
Start: 2019-04-22 | End: 2019-04-22

## 2019-04-22 RX ORDER — LIDOCAINE HYDROCHLORIDE 20 MG/ML
INJECTION, SOLUTION INFILTRATION; PERINEURAL PRN
Status: DISCONTINUED | OUTPATIENT
Start: 2019-04-22 | End: 2019-04-22

## 2019-04-22 RX ORDER — ALENDRONATE SODIUM 70 MG/1
70 TABLET ORAL WEEKLY
Status: DISCONTINUED | OUTPATIENT
Start: 2019-04-22 | End: 2019-04-23 | Stop reason: CLARIF

## 2019-04-22 RX ORDER — SODIUM CHLORIDE, SODIUM LACTATE, POTASSIUM CHLORIDE, CALCIUM CHLORIDE 600; 310; 30; 20 MG/100ML; MG/100ML; MG/100ML; MG/100ML
INJECTION, SOLUTION INTRAVENOUS CONTINUOUS
Status: DISCONTINUED | OUTPATIENT
Start: 2019-04-22 | End: 2019-04-22 | Stop reason: HOSPADM

## 2019-04-22 RX ORDER — ALBUMIN, HUMAN INJ 5% 5 %
SOLUTION INTRAVENOUS CONTINUOUS PRN
Status: DISCONTINUED | OUTPATIENT
Start: 2019-04-22 | End: 2019-04-22

## 2019-04-22 RX ADMIN — FENTANYL CITRATE 50 MCG: 50 INJECTION, SOLUTION INTRAMUSCULAR; INTRAVENOUS at 12:12

## 2019-04-22 RX ADMIN — SODIUM CHLORIDE, POTASSIUM CHLORIDE, SODIUM LACTATE AND CALCIUM CHLORIDE: 600; 310; 30; 20 INJECTION, SOLUTION INTRAVENOUS at 12:02

## 2019-04-22 RX ADMIN — PROPOFOL 25 MCG/KG/MIN: 10 INJECTION, EMULSION INTRAVENOUS at 14:02

## 2019-04-22 RX ADMIN — Medication 10 MG: at 18:43

## 2019-04-22 RX ADMIN — CEFAZOLIN SODIUM 2 G: 2 INJECTION, SOLUTION INTRAVENOUS at 13:13

## 2019-04-22 RX ADMIN — FENTANYL CITRATE 50 MCG: 50 INJECTION INTRAMUSCULAR; INTRAVENOUS at 20:59

## 2019-04-22 RX ADMIN — ROCURONIUM BROMIDE 20 MG: 10 INJECTION INTRAVENOUS at 17:10

## 2019-04-22 RX ADMIN — FENTANYL CITRATE 50 MCG: 50 INJECTION INTRAMUSCULAR; INTRAVENOUS at 21:13

## 2019-04-22 RX ADMIN — FENTANYL CITRATE 50 MCG: 50 INJECTION, SOLUTION INTRAMUSCULAR; INTRAVENOUS at 13:40

## 2019-04-22 RX ADMIN — PHENYLEPHRINE HYDROCHLORIDE 100 MCG: 10 INJECTION, SOLUTION INTRAMUSCULAR; INTRAVENOUS; SUBCUTANEOUS at 19:58

## 2019-04-22 RX ADMIN — PROPOFOL 50 MG: 10 INJECTION, EMULSION INTRAVENOUS at 12:12

## 2019-04-22 RX ADMIN — LIDOCAINE HYDROCHLORIDE 100 MG: 20 INJECTION, SOLUTION INFILTRATION; PERINEURAL at 12:12

## 2019-04-22 RX ADMIN — PHENYLEPHRINE HYDROCHLORIDE 100 MCG: 10 INJECTION, SOLUTION INTRAMUSCULAR; INTRAVENOUS; SUBCUTANEOUS at 14:50

## 2019-04-22 RX ADMIN — ROCURONIUM BROMIDE 20 MG: 10 INJECTION INTRAVENOUS at 13:52

## 2019-04-22 RX ADMIN — ROCURONIUM BROMIDE 20 MG: 10 INJECTION INTRAVENOUS at 18:19

## 2019-04-22 RX ADMIN — ACETAMINOPHEN 975 MG: 325 TABLET, FILM COATED ORAL at 10:44

## 2019-04-22 RX ADMIN — ONDANSETRON 4 MG: 2 INJECTION INTRAMUSCULAR; INTRAVENOUS at 19:37

## 2019-04-22 RX ADMIN — HYDROMORPHONE HYDROCHLORIDE 0.4 MG: 1 INJECTION, SOLUTION INTRAMUSCULAR; INTRAVENOUS; SUBCUTANEOUS at 22:15

## 2019-04-22 RX ADMIN — CEFAZOLIN SODIUM 1 G: 2 INJECTION, SOLUTION INTRAVENOUS at 17:25

## 2019-04-22 RX ADMIN — ALBUMIN HUMAN: 0.05 INJECTION, SOLUTION INTRAVENOUS at 16:32

## 2019-04-22 RX ADMIN — ROCURONIUM BROMIDE 20 MG: 10 INJECTION INTRAVENOUS at 15:48

## 2019-04-22 RX ADMIN — FENTANYL CITRATE 50 MCG: 50 INJECTION, SOLUTION INTRAMUSCULAR; INTRAVENOUS at 12:55

## 2019-04-22 RX ADMIN — ROCURONIUM BROMIDE 20 MG: 10 INJECTION INTRAVENOUS at 14:54

## 2019-04-22 RX ADMIN — DEXAMETHASONE SODIUM PHOSPHATE 8 MG: 4 INJECTION, SOLUTION INTRAMUSCULAR; INTRAVENOUS at 12:26

## 2019-04-22 RX ADMIN — Medication 5 MG: at 18:14

## 2019-04-22 RX ADMIN — GABAPENTIN 300 MG: 300 CAPSULE ORAL at 10:45

## 2019-04-22 RX ADMIN — PHENYLEPHRINE HYDROCHLORIDE 100 MCG: 10 INJECTION, SOLUTION INTRAMUSCULAR; INTRAVENOUS; SUBCUTANEOUS at 17:24

## 2019-04-22 RX ADMIN — CEFAZOLIN SODIUM 1 G: 2 INJECTION, SOLUTION INTRAVENOUS at 15:25

## 2019-04-22 RX ADMIN — SODIUM CHLORIDE 10 MG/KG/HR: 0.9 INJECTION, SOLUTION INTRAVENOUS at 13:30

## 2019-04-22 RX ADMIN — PHENYLEPHRINE HYDROCHLORIDE 100 MCG: 10 INJECTION, SOLUTION INTRAMUSCULAR; INTRAVENOUS; SUBCUTANEOUS at 19:48

## 2019-04-22 RX ADMIN — SODIUM CHLORIDE: 9 INJECTION, SOLUTION INTRAVENOUS at 12:29

## 2019-04-22 RX ADMIN — PHENYLEPHRINE HYDROCHLORIDE 100 MCG: 10 INJECTION, SOLUTION INTRAMUSCULAR; INTRAVENOUS; SUBCUTANEOUS at 16:48

## 2019-04-22 RX ADMIN — FENTANYL CITRATE 25 MCG: 50 INJECTION INTRAMUSCULAR; INTRAVENOUS at 20:49

## 2019-04-22 RX ADMIN — LIDOCAINE HYDROCHLORIDE 1 MG/KG/HR: 20 INJECTION, SOLUTION INTRAVENOUS at 13:25

## 2019-04-22 RX ADMIN — FENTANYL CITRATE 50 MCG: 50 INJECTION, SOLUTION INTRAMUSCULAR; INTRAVENOUS at 15:48

## 2019-04-22 RX ADMIN — PHENYLEPHRINE HYDROCHLORIDE 0.5 MCG/KG/MIN: 10 INJECTION, SOLUTION INTRAMUSCULAR; INTRAVENOUS; SUBCUTANEOUS at 17:24

## 2019-04-22 RX ADMIN — PHENYLEPHRINE HYDROCHLORIDE 100 MCG: 10 INJECTION, SOLUTION INTRAMUSCULAR; INTRAVENOUS; SUBCUTANEOUS at 17:04

## 2019-04-22 RX ADMIN — SODIUM CHLORIDE 1870 MG: 9 INJECTION, SOLUTION INTRAVENOUS at 13:00

## 2019-04-22 RX ADMIN — CALCIUM CHLORIDE 1 G: 100 INJECTION, SOLUTION INTRAVENOUS at 16:59

## 2019-04-22 RX ADMIN — ROCURONIUM BROMIDE 20 MG: 10 INJECTION INTRAVENOUS at 13:28

## 2019-04-22 RX ADMIN — ROCURONIUM BROMIDE 50 MG: 10 INJECTION INTRAVENOUS at 12:15

## 2019-04-22 RX ADMIN — SCOPALAMINE 1 PATCH: 1 PATCH, EXTENDED RELEASE TRANSDERMAL at 10:45

## 2019-04-22 RX ADMIN — MIDAZOLAM 2 MG: 1 INJECTION INTRAMUSCULAR; INTRAVENOUS at 12:02

## 2019-04-22 RX ADMIN — Medication: at 21:06

## 2019-04-22 RX ADMIN — Medication 5 MG: at 19:10

## 2019-04-22 RX ADMIN — Medication 5 MG: at 14:58

## 2019-04-22 RX ADMIN — KETAMINE HYDROCHLORIDE 5 MG/HR: 100 INJECTION, SOLUTION, CONCENTRATE INTRAMUSCULAR; INTRAVENOUS at 13:00

## 2019-04-22 RX ADMIN — ALBUMIN HUMAN: 0.05 INJECTION, SOLUTION INTRAVENOUS at 17:22

## 2019-04-22 RX ADMIN — CEFAZOLIN SODIUM 1 G: 2 INJECTION, SOLUTION INTRAVENOUS at 19:14

## 2019-04-22 RX ADMIN — FENTANYL CITRATE 50 MCG: 50 INJECTION, SOLUTION INTRAMUSCULAR; INTRAVENOUS at 13:31

## 2019-04-22 RX ADMIN — PHENYLEPHRINE HYDROCHLORIDE 50 MCG: 10 INJECTION, SOLUTION INTRAMUSCULAR; INTRAVENOUS; SUBCUTANEOUS at 14:43

## 2019-04-22 RX ADMIN — SUGAMMADEX 150 MG: 100 INJECTION, SOLUTION INTRAVENOUS at 19:41

## 2019-04-22 RX ADMIN — HYDROMORPHONE HYDROCHLORIDE 0.3 MG: 1 INJECTION, SOLUTION INTRAMUSCULAR; INTRAVENOUS; SUBCUTANEOUS at 21:35

## 2019-04-22 ASSESSMENT — MIFFLIN-ST. JEOR: SCORE: 1158.51

## 2019-04-22 NOTE — OP NOTE
Date of Service:  4/22/2019       Surgeon:  Mahendra Armstrong MD   Co-surgeon:  Pati Bowling MD, vascular surgeon.  Dr. Bowling's expertise as a vascular surgeon was necessary for safe anterior retroperitoneal approach to the lumbosacral spine.  Assistants:  (1) Lidia Paz MD PGY-4; (2) Michael Quiroz MS4.      Preoperative Diagnosis:     1.  Subjacent level spondylosis L5-S1.  2.  Right S1 radiculopathy, with positive response to R L5-S1 TFESI.  3.  S/p TLIF L4-5 with retained bilateral pedicle screw fixation (3/12/2013 Dr. Pennington, [facility]); s/p MIS R SIJ Fusion (12/12/2017 Dr. Neal).      Postoperative Diagnosis:     Same      Procedures:   Part 1 - anterior procedure:  1.  Anterior interbody fusion via oblique anterior approach (OLIF or lateral ALIF) L5-S1, using Infuse BMP (Small kit) and allograft.  2.  Placement of interbody titanium cage L5-S1.  3.  Placement of integrated buttress screw thru L5-S1 cage into S1 vertebral body.      Part 2 - posterior procedure:  1.  Smith-Carroll osteotomy L5-S1.  2.  Posterior spinal fusion (bilateral intertransverse and interlaminar) L4-S1 (fusion augmentation at L4-5), using Magnifuse allograft (bilateral intertransverse); local autograft and crushed cancellous allograft (interlaminar).  3.  Bilateral posterior segmental pedicle screw fixation L4-sacrum (revision screws at bilateral L4 and L5).  4.  Unilateral (right-sided) pelvic (S2AI) screw fixation.  5.  Computer navigation using O-arm and Stealth.      A -22 modifier is justified for use in this case given the previous surgeries (L4-5 fusion and R SIJ fusion), altered anatomy with significant postsurgical scarring, which necessitated significant extra time and effort > 25% more than usual to perform exposure, removal of existing instrumentation, placement of new fixation, osteotomy and fusion.  Anesthesia:  General endotracheal.   Local anesthetic:  0.25% marcaine + epinephrine = 50 mL (part 1 = 20 mL, part 2 =  30).  EBL:  Part 1 = 100 mL; Part 2 = 400 mL; TOTAL = 500 mL.  Table Adjustment prior to sean placement:  From 10 degrees flexion to 5 degrees extension (15 degree arc change).  Complications:  Small probable dural tear sustained at sacral level, while decorticating the dorsal sacral lamina with a lor.  Matchstick size defect noted,with collapsed dura, but no fluid leak.  The rest of the dural sac at L5-S1 level did not lose turgor, thus I could not really tear if this was a real dural tear.  Also deemed inaccessible to primary repair without completely unroofing sacral canal.  I covered the small defect with muscle graft reinforced with synthetic (Duragen patch), the edges of which were sewn to surrounding soft tissues.  No leak on Valsalva maneuver.  Further augmented with Duraseal fibrin glue.   Implants Removed:  Jesse Ju screw system (even though previous surgeon's operative report stated Rosa Trio): Two screws each at L4 and L5; two rods and four set plugs.  Implants / Equipment used:   1.  NuVasive Base titanium cage: L5-S1 = 8 mm post ht x 16 mm ant ht x 42 mm width x 30 mm depth, 15 degrees lordotic; One 5.0 x 22.5 mm screw, inserted thru cage into S1.    2.  NuVasive Reline screw system: L4 = 7.5 x 45 mm bilateral; L5 = 7.5 x 45 mm bilateral; S1 = none on right, 7.5 x 45 mm left; S2AI = 9.5 x 90 mm right, none on left.  Two 5.5 mm titanium rods: right 120 mm, left 80 mm.  3.  Infuse BMP Small kit.  4.  Crushed cancellous allograft 30 mL.  5Magnifuse allograft 5 cm strips x 2.  6.  TXA 30 mg/kg LD then 10 mg/kg/hr.  7.  Vancomycin powder 1 gm (80% deep. 20% subcutaneous).        Indications:  64 year old female with chronic low back and right leg radicular pain; had previous L4-5 fusion in 2013 and R SIJ fusion 2017.  These surgeries helped with symptoms at the time.  Imaging revealed subjacent level spondylosis and stenosis L5-S1.  No significant sagittal malalignment.  Tried nonoperative  treatment, continues to have significant disabling symptoms.  I thus offered surgery in form of L5-S1 fusion via anterior and posterior approach, possible pelvic fixation.  Consented after thorough discussion of the rationale, risks, benefits and alternatives.  Discussed bone graft options; consented to on-label use of Infuse BMP.        Details:  Part 1 - anterior procedure:    Properly identified in preop area, site marked, informed consent signed.  Wheeled to OR.  Brief earlier performed.  General anesthesia administered.  Givens inserted.  Antibiotic given: Cefazolin 2 gm IV then 1 gm q2h intraop.  Positioned right lateral decubitus (left side up) on Flat Trios table.  Positioned secured using tape; L arm placed on overhead arm board.  Lower abdominal region squared off, prepped with Chlorhexidine scrub, ChloraPrep and draped in sterile fashion. Surgical timeout performed.  Both Dr. Bowling and myself were present for timeout.      Dr. Bowling performed left anterior oblique retroperitoneal approach to lumbosacral spine.  C-arm lateral imaging used to select skin incision site.  Please refer to her report for details.  NuVasive lateral ALIF retractor system used.  Once exposed, G18 needle inserted into presumed L5-S1 disc; lateral image showed correct level; verified by Radiologist.  Confirmatory timeout performed.  Rectangular annulectomy using long knife.  Cartilaginous endplate inferior L5 and superior S1 released using Edwards elevator.  Disc material removed using pituitary and kerrison rongeurs.  Endplates prepped using straight and angled curettes, and 5mm ball-tipped lor.  Trial spacers placed 42 x 30 mm footprint; started with 10 degrees - 6, 8, 10 and 12 mm.  Went up to 15 degree trials - 6 and 8 mm posterior heights.  Tried 20 degree trials, but I deemed that these were too big, so went back down to 15 degrees.  Elected to use 8 mm posterior height cage.  Titanium Base cage packed with BMP and allograft.  A  Small kit Infuse was earlier prepared; this contained 2 sponges.  Both sponges were placed inside the cage, plus allograft.  Cage impacted into disc space.  A 22.5 mm screw was inserted thru the cage into S1 vertebral body, for buttressing purposes; no anterior fixation crossing the motion segment was performed.  Final AP-lateral images showed acceptable cage and screw placement, with good restoration of disc height and segmental lordosis.  Irrigation.  Hemostasis observed.  Closure performed by Dr. Bowling.  Anesthetic injected.  Sterile dressings applied.      Part 2 - posterior procedure:  Patient turned prone on Pro-Axis table.  Table flexed 10 degrees to facilitate exposure.  Posterior hair clipped, lumbosacral region squared off, prepped with chlorhexidine scrub, chloraprep and draped in sterile fashion.  Surgical timeout performed.     Midline skin incision made utilizing previous surgical scar, extended distally to cover ~ L4-pelvis; bilateral revision subperiosteal exposure out to previous instrumentation and transverse processes.  Used previous instrumentation at known levels for localization; thus radiologist confirmation not necessary.  Posterior fixation removed; noted that the Styker Trio system which we had prepared, did not fit the set screws (nor the pedicle screws).  Turned out the system that was in the patient were not Rosa Trio (despite being stated such in the previous surgeon's operative report).  We opened up and tried various different kinds of drivers.  We eventually figured that the system that was present was JesseMedmindera.  We were able to remove the set screws using hex drivers.  The pedicle screws were tougher to remove because they required a different .  We were eventually able to remove them using a 'helicopter method' where we placed a short sean onto the tulip head, then a set screw which was tightened, then used a counter-torque to twist the screw out.  Noted good purchase  of all screws indicating solid fusion.  Visual inspection of the dorsal fusion mass also revealed solid dorsal arthrodesis L4-5.  Spinous process clamp placed at L3; attached vito frame.  O-arm 3D scan obtained: L4-pelvis.     Screws placed bilateral L4 to pelvis.  Used previous track right L4.  At left L4, used same starting point, but redirected the track laterally as there was a mild medial breach.  At L5, the right screw was noted to be laterally malpositioned; I decided to use same starting point, but redirected medially.  Previous left L5 screw was medially malpositioned; I created a more lateral starting point.  Placed de jojo screw left S1.  At right S1, I noted that there isn't much space above the cephalad SIJ fusion sean to place an S1 pedicle screw.  I thus decided to place pelvic fixation (S2AI screw) on the right side.  Although the caudal SIJ fusion sean was somewhat in the way, there was space below it, right above the sciatic notch that we were able to use to place a screw.  Lor used to decorticate L5 transverse processes and sacral ala bilaterally, and to create  hole using landmarks.  Track created using vito awl or drill with 30 mm stop.  Stealth used to select screw size.  Undertapped by 1 size, except at right L5, where we tapped line to line; used navigated tap.  Screw inserted using vito .  At right pelvis, used S2AI trajectory; used vito awl to create initial track down to level of sacroiliac joint; then drilled using vito drill with 90 mm stop.  Tapped to depth of screw using 6.5 mm and 9.5 mm vito taps.  Screw placed using vito .  After all screws in place, check scan showed good placement of all screws.       Escobar-Carroll oteotomy performed L5-S1.  I used rongeur to resect additional bone; angled curettes to identify the laminar edges.  Resected bilateral descending processes using lor and osteotome.  Bone saved for grafting purposes.  Bilateral ascending processes resected  using same, flush to pedicle.  Ligamentum flavum completely resected; thus achieved central and bilateral complete decompression.  Bleeders controlled using bipolar, Floseal and cottonoids.  I inspected the L5-S1 disc; I noted disc protrusion on the left; this may be pressing on the traversing S1 nerve, so I decided to remove this.  Rectangular annulectomy performed using #15 blade.  I noted the posterior surface of the titanium cage we earlier inserted from the front; this was almost flush to the posterior vertebral body borders, and thus may have pushed the posterior annulus posteriorly against the nerve.  After removal of the annulus, noted good decompression of the nerve.     Table gradually extended in 5 degree increments to 5 degrees extension (15 degree arc).  Noted posterior column shortening, indicating increased lordosis.  Stitched lateral image taken; L4-S1 measured at 28 degrees, which was my ideal target value, and well above 2/3 of PI (37 degrees).  Rg lengths measured; used precut prebent titanium rods.  Contoured further using Abakuser.  Left rg placed; used Eliana rg reducer.  Set plugs placed.  Right rg placed; rg reduced to heads; set plugs placed.  AP and lateral stitched images taken.  Noted that the right rg was a little short at the bottom; confirmed with visual inspection.  We removed this rg.  I contoured a longer rg (120 mm), reduced this to the screwheads and placed set screws.  Visually noted good rg lengths on both ends.  Set plugs final tightened.  Of note, the company's torque-limited screw  was not working properly and was acting as a fixed  and one of the tips broke; fortunately we were able to remove it from the set screw using a magnet.  We thus hand-tightened to what I felt was acceptable torque.     Posterior fusion performed L4-sacrum.  Magnifuse bone graft placed at bilateral intertransverse pockets over decorticated transverse processes and sacral ala.   Posterior elements (spinous process, lamina, dorsal fusion mass) L4-sacrum decorticated using lor.  As I was decorticating the dorsal sacral lamina, the matchstick lor tip plunged thru the thin lamina.  I noted collapsed dura thru the small bony defect; probably but not certain dural tear; no spinal fluid leak.  Rather than unroof the sacral canal, I elected to simply place a muscle graft and synthetic patch.  Square gelfoam placed over laminar defect L5-S1.  Local autograft placed over decorticated laminae.  There was small amount of leftover allograft from anterior part, ~ 10 mL; also used as bone graft.     Irrigated.  Superficial (suprafascial) medium Hemovac drain placed.  Vancomycin powder 1 gm applied (80% deep, 20% subq).  Layered closure: #1 vicryl popoffs for deep fascia, running 0 vicryl for deep subq, 2-0 Vicryl for subq and 3-0 Monocryl.  Prineo dermabond and sterile dressings applied.  Injected anesthetic 60 mL.  Turned supine, taken off general anesthetic, transferred to PACU in satisfactory condition.       Postop:  Admit to surgical floor  Antibiotics x 24 hrs.  Mechanical DVT prophylaxis.  Hospitalist medical co-management.  PT and OT consult.  NPO except for ice chips until with good bowel sounds and flatus; then advance as tolerated.  Because of probable dural tear, will keep flat in bed x 36 hours then mobilize on Wednesday morning.  No lifting more than 10 pounds, no excessive bending or twisting.  Lumbar AP-lat standing x-rays (incl femoral heads) prior to discharge.  Anticipate discharge in 3-5 days.  RTC 6 weeks with EOS full-spine AP-lat x-rays.

## 2019-04-22 NOTE — ANESTHESIA PROCEDURE NOTES
Arterial Line Procedure Note  Staff:     Anesthesiologist:  Ludivina Cordoba MD  Location: In OR After Induction  Procedure Start/Stop Times:     patient identified, IV checked, site marked, risks and benefits discussed, informed consent, monitors and equipment checked, pre-op evaluation and at physician/surgeon's request      Correct Patient: Yes      Correct Position: Yes      Correct Site: Yes      Correct Procedure: Yes      Correct Laterality:  Yes    Site Marked:  Yes  Line Placement:     Procedure:  Arterial Line    Insertion Site:  Radial    Insertion laterality:  Left    Skin Prep: Chloraprep      Patient Prep: patient draped, sterile gloves and hat      Local skin infiltration:  None    Ultrasound Guided?: Yes      Artery evaluated via ultrasound confirming patency.   Using realtime imaging, the artery was punctured and the needle was observed entering the artery.      A permanent image is NOT entered into the patient's record.      Catheter size:  20 gauge, Quick cath    Cath secured with: other (comment)      Dressing:  Tegaderm    Complications:  None obvious    Arterial waveform: Yes      IBP within 10% of NIBP: Yes

## 2019-04-22 NOTE — OR NURSING
Patient's BP started going up over time. She was in the process of  Increasing meds and her BP spiked, 212/97, headaches. Inge had an appointment with a Cardiologist and he sent her to an ER. She was admitted and nothing was found with the  Heart. Medications were changed at that time. June 2017 this occurred.

## 2019-04-23 LAB
ANION GAP SERPL CALCULATED.3IONS-SCNC: 6 MMOL/L (ref 3–14)
BASOPHILS # BLD AUTO: 0 10E9/L (ref 0–0.2)
BASOPHILS NFR BLD AUTO: 0 %
BUN SERPL-MCNC: 12 MG/DL (ref 7–30)
CALCIUM SERPL-MCNC: 8.1 MG/DL (ref 8.5–10.1)
CHLORIDE SERPL-SCNC: 106 MMOL/L (ref 94–109)
CO2 SERPL-SCNC: 30 MMOL/L (ref 20–32)
CREAT SERPL-MCNC: 0.77 MG/DL (ref 0.52–1.04)
DIFFERENTIAL METHOD BLD: ABNORMAL
EOSINOPHIL # BLD AUTO: 0 10E9/L (ref 0–0.7)
EOSINOPHIL NFR BLD AUTO: 0 %
ERYTHROCYTE [DISTWIDTH] IN BLOOD BY AUTOMATED COUNT: 12.9 % (ref 10–15)
GFR SERPL CREATININE-BSD FRML MDRD: 82 ML/MIN/{1.73_M2}
GLUCOSE SERPL-MCNC: 142 MG/DL (ref 70–99)
HCT VFR BLD AUTO: 31 % (ref 35–47)
HGB BLD-MCNC: 10.2 G/DL (ref 11.7–15.7)
IMM GRANULOCYTES # BLD: 0.1 10E9/L (ref 0–0.4)
IMM GRANULOCYTES NFR BLD: 0.4 %
LYMPHOCYTES # BLD AUTO: 0.8 10E9/L (ref 0.8–5.3)
LYMPHOCYTES NFR BLD AUTO: 6.6 %
MCH RBC QN AUTO: 29.6 PG (ref 26.5–33)
MCHC RBC AUTO-ENTMCNC: 32.9 G/DL (ref 31.5–36.5)
MCV RBC AUTO: 90 FL (ref 78–100)
MONOCYTES # BLD AUTO: 0.8 10E9/L (ref 0–1.3)
MONOCYTES NFR BLD AUTO: 6.5 %
NEUTROPHILS # BLD AUTO: 10.6 10E9/L (ref 1.6–8.3)
NEUTROPHILS NFR BLD AUTO: 86.5 %
NRBC # BLD AUTO: 0 10*3/UL
NRBC BLD AUTO-RTO: 0 /100
PLATELET # BLD AUTO: 144 10E9/L (ref 150–450)
POTASSIUM SERPL-SCNC: 4.3 MMOL/L (ref 3.4–5.3)
RBC # BLD AUTO: 3.45 10E12/L (ref 3.8–5.2)
SODIUM SERPL-SCNC: 142 MMOL/L (ref 133–144)
WBC # BLD AUTO: 12.2 10E9/L (ref 4–11)

## 2019-04-23 PROCEDURE — 25800030 ZZH RX IP 258 OP 636: Performed by: STUDENT IN AN ORGANIZED HEALTH CARE EDUCATION/TRAINING PROGRAM

## 2019-04-23 PROCEDURE — 36415 COLL VENOUS BLD VENIPUNCTURE: CPT | Performed by: STUDENT IN AN ORGANIZED HEALTH CARE EDUCATION/TRAINING PROGRAM

## 2019-04-23 PROCEDURE — 85025 COMPLETE CBC W/AUTO DIFF WBC: CPT | Performed by: STUDENT IN AN ORGANIZED HEALTH CARE EDUCATION/TRAINING PROGRAM

## 2019-04-23 PROCEDURE — 99232 SBSQ HOSP IP/OBS MODERATE 35: CPT | Performed by: INTERNAL MEDICINE

## 2019-04-23 PROCEDURE — 80048 BASIC METABOLIC PNL TOTAL CA: CPT | Performed by: STUDENT IN AN ORGANIZED HEALTH CARE EDUCATION/TRAINING PROGRAM

## 2019-04-23 PROCEDURE — 25000128 H RX IP 250 OP 636: Performed by: STUDENT IN AN ORGANIZED HEALTH CARE EDUCATION/TRAINING PROGRAM

## 2019-04-23 PROCEDURE — 12000001 ZZH R&B MED SURG/OB UMMC

## 2019-04-23 PROCEDURE — 25000132 ZZH RX MED GY IP 250 OP 250 PS 637: Performed by: STUDENT IN AN ORGANIZED HEALTH CARE EDUCATION/TRAINING PROGRAM

## 2019-04-23 RX ADMIN — OXYCODONE HYDROCHLORIDE 5 MG: 5 TABLET ORAL at 22:24

## 2019-04-23 RX ADMIN — ACETAMINOPHEN 975 MG: 325 TABLET, FILM COATED ORAL at 20:05

## 2019-04-23 RX ADMIN — POTASSIUM CHLORIDE, DEXTROSE MONOHYDRATE AND SODIUM CHLORIDE: 150; 5; 450 INJECTION, SOLUTION INTRAVENOUS at 01:31

## 2019-04-23 RX ADMIN — GABAPENTIN 300 MG: 100 CAPSULE ORAL at 08:25

## 2019-04-23 RX ADMIN — ACETAMINOPHEN 975 MG: 325 TABLET, FILM COATED ORAL at 12:30

## 2019-04-23 RX ADMIN — GABAPENTIN 300 MG: 100 CAPSULE ORAL at 20:06

## 2019-04-23 RX ADMIN — POLYETHYLENE GLYCOL 3350 17 G: 17 POWDER, FOR SOLUTION ORAL at 08:25

## 2019-04-23 RX ADMIN — RANITIDINE 150 MG: 150 TABLET ORAL at 08:25

## 2019-04-23 RX ADMIN — CEFAZOLIN 1 G: 1 INJECTION, POWDER, FOR SOLUTION INTRAMUSCULAR; INTRAVENOUS at 03:37

## 2019-04-23 RX ADMIN — SENNOSIDES AND DOCUSATE SODIUM 2 TABLET: 8.6; 5 TABLET ORAL at 20:06

## 2019-04-23 RX ADMIN — POTASSIUM CHLORIDE, DEXTROSE MONOHYDRATE AND SODIUM CHLORIDE: 150; 5; 450 INJECTION, SOLUTION INTRAVENOUS at 14:46

## 2019-04-23 RX ADMIN — CEFAZOLIN 1 G: 1 INJECTION, POWDER, FOR SOLUTION INTRAMUSCULAR; INTRAVENOUS at 11:16

## 2019-04-23 RX ADMIN — RANITIDINE 150 MG: 150 TABLET ORAL at 20:05

## 2019-04-23 RX ADMIN — OXYCODONE HYDROCHLORIDE 5 MG: 5 TABLET ORAL at 17:00

## 2019-04-23 RX ADMIN — PSYLLIUM HUSK 1 PACKET: 3.4 POWDER ORAL at 10:11

## 2019-04-23 RX ADMIN — SENNOSIDES AND DOCUSATE SODIUM 2 TABLET: 8.6; 5 TABLET ORAL at 08:25

## 2019-04-23 RX ADMIN — CYANOCOBALAMIN TAB 1000 MCG 1000 MCG: 1000 TAB at 16:59

## 2019-04-23 ASSESSMENT — ACTIVITIES OF DAILY LIVING (ADL)
ADLS_ACUITY_SCORE: 15
BATHING: 0-->INDEPENDENT
ADLS_ACUITY_SCORE: 15
TRANSFERRING: 0-->INDEPENDENT
RETIRED_EATING: 0-->INDEPENDENT
RETIRED_COMMUNICATION: 0-->UNDERSTANDS/COMMUNICATES WITHOUT DIFFICULTY
ADLS_ACUITY_SCORE: 15
FALL_HISTORY_WITHIN_LAST_SIX_MONTHS: NO
ADLS_ACUITY_SCORE: 15
ADLS_ACUITY_SCORE: 15
COGNITION: 0 - NO COGNITION ISSUES REPORTED
AMBULATION: 0-->INDEPENDENT
DRESS: 0-->INDEPENDENT
ADLS_ACUITY_SCORE: 15
PRIOR_FUNCTIONAL_LEVEL_COMMENT: INDEPENDENT
SWALLOWING: 0-->SWALLOWS FOODS/LIQUIDS WITHOUT DIFFICULTY
TOILETING: 0-->INDEPENDENT

## 2019-04-23 NOTE — CONSULTS
Internal Medicine Consult Note    Reason for consult: Co-management following spinal surgery    History of present illness:     Ms. Son is a 63 y/o female with history of lumbosacral spondylosis with radiculopathy, hypertension, s/p TLIF by Dr. Pennington at [facility] on 3/12/13, SI joint fusion by Dr. Neal on 12/12/17, and more recently an anterior Lumbar Interbody Fusion Lumbar 5-Sacral 1 Posterior Intrumented Spinal Fusion and Escobar Quiroz Osteotomy Lumbar 5-Sacral 1, and Right Pelvic Fixation with Dr. Armstrong and Dr. Bowling.  Additionally, she has a history of GERD, CVA, HTN, osteopenia and post-op nausea and emesis.      Internal medicine is consulted for co-management.  At the time of the consult Ms. Son was in the PACU with no acute complaints.  Her pain was being management with PRN fentanyl and she was started on a dilaudid PCA.  An arterial line was placed and she was normotensive. Estimated blood loss was 500mL.      Review Of Systems  Skin: negative  Eyes: negative  Ears/Nose/Throat: negative  Respiratory: No shortness of breath, dyspnea on exertion, cough, or hemoptysis  Cardiovascular: negative  Gastrointestinal: negative  Genitourinary: negative  Musculoskeletal: post operative from spinal surgery  Neurologic:  Mild paraesthesias in feet bilaterally  Psychiatric: negative  Hematologic/Lymphatic/Immunologic: negative    Current Facility-Administered Medications   Medication     [START ON 4/25/2019] acetaminophen (TYLENOL) tablet 650 mg     acetaminophen (TYLENOL) tablet 975 mg     bupivacaine 0.25 % - EPINEPHrine 1:200,000 injection     diazepam (VALIUM) tablet 5 mg     dural sealant (DURASEAL) kit     fentaNYL (PF) (SUBLIMAZE) injection 25-50 mcg     gabapentin (NEURONTIN) capsule 300 mg     hemostatic matrix (FLOSEAL) kit     HYDROmorphone (DILAUDID) PCA 1 mg/mL OPIOID NAIVE     HYDROmorphone (PF) (DILAUDID) injection 0.3-0.5 mg     HYDROmorphone (PF) (DILAUDID) injection 0.3-0.5 mg     ketamine  "(KETALAR) 100 mg in sodium chloride 0.9 % 50 mL infusion     lactated ringers infusion     lidocaine 400 mg in D5W 50 ml (ADULT STD) mixture - for ANALGESIA     meperidine (DEMEROL) injection 12.5 mg     naloxone (NARCAN) injection 0.1-0.4 mg     ondansetron (ZOFRAN-ODT) ODT tab 4 mg    Or     ondansetron (ZOFRAN) injection 4 mg     oxyCODONE (ROXICODONE) tablet 5-10 mg     prochlorperazine (COMPAZINE) injection 10 mg     scopolamine (TRANSDERM-SCOP) Patch in Place     [START ON 4/23/2019] scopolamine (TRANSDERM-SCOP) patch REMOVAL     sodium chloride 0.9% (bottle) irrigation     tranexamic acid (CYKLOKAPRON) 5 g in sodium chloride 0.9 % 250 mL infusion     vancomycin (VANCOCIN) topical powder     Past Medical History:   Diagnosis Date     Chronic pain      Decreased hearing of left ear      GERD (gastroesophageal reflux disease)     Occasional symptoms, uses TUMS PRN.     H/O CVA (cerebral vascular accident) (H)     Residual hearing loss on the left.     History of skin cancer      HTN (hypertension)      Osteoarthritis      Osteopenia      PONV (postoperative nausea and vomiting)      Family History   Problem Relation Age of Onset     Lung Cancer Mother      Coronary Artery Disease Mother      Heart Disease Father      Stomach Cancer Father         Survived     Cerebrovascular Disease Father      Hypertension Father      Kidney Disease Father         Only one functioning kidney.     Polymyositis Sister      Lymphoma Brother         [place]     Hypertension Brother      Crohn's Disease Sister      Coronary Artery Disease Brother        /58   Pulse 74   Temp 98.6  F (37  C) (Oral)   Resp (!) 6   Ht 1.62 m (5' 3.78\")   Wt 62.2 kg (137 lb 2 oz)   SpO2 97%   BMI 23.70 kg/m      Exam:  Constitutional: healthy, alert and no distress  Head: negative, Normocephalic. No masses, lesions, tenderness or abnormalities  Neck: Neck supple. No adenopathy. Thyroid symmetric, normal size,, Carotids without " bruits.  Cardiovascular: negative, PMI normal. No lifts, heaves, or thrills. RRR. No murmurs, clicks gallops or rub  Respiratory: negative, Percussion normal. Good diaphragmatic excursion. Lungs clear  Gastrointestinal: negative, Abdomen soft, non-tender. BS normal. No masses, organomegaly  : Deferred  Musculoskeletal:post-op changes from spinal surgery, moving all extremities purposefully  Skin: no suspicious lesions or rashes  Neurologic: Bilateral paraesthesias in feet, sensation in-tact throughout  Hematologic/Lymphatic/Immunologic: Normal cervical lymph nodes  Negative      Clinical Impression: Ms. Son is a 65 y/o female with history of HTN now s/p spinal surgery for spondylosis and radiculopathy.    Assesement and Plan:    #1  Lumbosacral spondylosis with radiculopathy, s/pTLIF by Dr. Pennington at [facility] on 3/12/13, SI joint fusion by Dr. Neal on 12/12/17, and anterior Lumbar Interbody Fusion Lumbar 5-Sacral 1 Posterior Intrumented Spinal Fusion and Escobar Quiroz Osteotomy Lumbar 5-Sacral 1, and Right Pelvic Fixation with Dr. Armstrong and Dr. Bowling today  --Pain is currently controlled with a fentanyl bolus and she will be on a Dilaudid PCA on the floor  --PCA order set with PRN narcan   --Nausea control with PRN zofran   --gabapentin 300mg BID  --tylenol PRN  --valium PRN    #2 HTN  --Hold home hydrochlorothiazide and amlodipine for now and assess response given that she is on a PCA with normal blood pressure, would restart amlodipine first if needed     #3 Hypokalemia  --Potassium replacement orderset placed    #4 GERD  --TUMS PRN    #5 Ostenopenia   --Hold home alendronate

## 2019-04-23 NOTE — ANESTHESIA CARE TRANSFER NOTE
Patient: Inge Son    Procedure(s):  Anterior Lumbar Interbody Fusion Lumbar 5-Sacral 1  Posterior Intrumented Spinal Fusion and Escobar Quiroz Osteotomy Lumbar 5-Sacral 1, and Right Pelvic Fixation    Diagnosis: Spondylosis, Radiculopathy, Kyphosis  Diagnosis Additional Information: No value filed.    Anesthesia Type:   No value filed.     Note:  Airway :Face Mask  Patient transferred to:PACU  Handoff Report: Identifed the Patient, Identified the Reponsible Provider, Reviewed the pertinent medical history, Discussed the surgical course, Reviewed Intra-OP anesthesia mangement and issues during anesthesia, Set expectations for post-procedure period and Allowed opportunity for questions and acknowledgement of understanding      Vitals: (Last set prior to Anesthesia Care Transfer)    CRNA VITALS  4/22/2019 1948 - 4/22/2019 2032 4/22/2019             Pulse:  108    SpO2:  100 %    Resp Rate (observed):  20                Electronically Signed By: JORDI Buenrostro CRNA  April 22, 2019  8:32 PM

## 2019-04-23 NOTE — PHARMACY
Patient PTA med Fosamax 70 mg q weekly (take on Sundays) is categorized as a non essential med for a hospital stay and was discontinued per hospital policy as an inpatient medication.

## 2019-04-23 NOTE — OR NURSING
K was drawn during surgery, result was 3.0. Called Dr. Tavarez to verify that he knew about it. Orders received to redraw K and update with the results. Eliezer lab from art line, and sent to lab. Art line now doesn't correlate and site is slightly swollen. Attempted to troubleshoot, reposition arm, re-zero with no change. Updated Dr. Tavarez, and d/c'd art line.

## 2019-04-23 NOTE — PROGRESS NOTES
"VASCULAR SURGERY PROGRESS NOTE    Subjective:  Pt found resting in bed. Pt reports that she thinks she's doing well today all things considered. Pt is on bedrest until tomorrow, but rolled in bed to take meds and that went well. Her pain is well controlled. She has not passed gas yet, but feels she will soon as abdomen is gurgling. She has no concerns and all questions were answered.    Objective:    Intake/Output Summary (Last 24 hours) at 4/23/2019 1049  Last data filed at 4/23/2019 1002  Gross per 24 hour   Intake 2956 ml   Output 3400 ml   Net -444 ml     Labs:  ROUTINE IP LABS (Last four results)  BMP  Recent Labs   Lab 04/23/19  0720 04/22/19  2207 04/22/19  1623 04/22/19  1000     --  139  --    POTASSIUM 4.3 3.4 3.0* 4.8   CHLORIDE 106  --   --   --    COMPA 8.1*  --   --   --    CO2 30  --   --   --    BUN 12  --   --   --    CR 0.77  --   --   --    *  --  138* 101*     CBC  Recent Labs   Lab 04/23/19  0720 04/22/19  1623   WBC 12.2*  --    RBC 3.45*  --    HGB 10.2* 12.2   HCT 31.0*  --    MCV 90  --    MCH 29.6  --    MCHC 32.9  --    RDW 12.9  --    *  --      INRNo lab results found in last 7 days.  PHYSICAL EXAM:  /47 (BP Location: Right arm)   Pulse 67   Temp 97.9  F (36.6  C) (Oral)   Resp 15   Ht 1.62 m (5' 3.78\")   Wt 62.2 kg (137 lb 2 oz)   SpO2 99%   BMI 23.70 kg/m    General: The patient is alert and oriented. Appropriate. No acute distress  Psych: pleasant affect, answers questions appropriately  Skin: Color appropriate for race, warm, dry.  Respiratory: The patient does not require supplemental oxygen. Breathing unlabored  GI:  Abdomen soft, nontender to light palpation. LLQ incision C/D/I closed with dermabond without erythema, edema, drainage, or discharge. Dressing removed and to be left off anterior incision   Extremities: bilateral DP pulses palpable,  no edema noted.      LLQ incision:        ASSESSMENT:  64 year old s/p ALIF L5-S1 with Dr. Bowling, revision " PISF L3-4 with extension to pelvis, SPO at L5-S1 with Dr. Armstrong. Complicated by intraoperative dural tear.      PLAN:  -Leave dressing off anterior incision  -Continue cares per ortho, medicine  -Vascular will follow peripherally, call if questions      Nadine Mcfadden PA-C   Division of Vascular Surgery   [facility]   Pager: [phone]

## 2019-04-23 NOTE — OR NURSING
Pt c/o numbness and tingling in all of her toes. Able to wiggle toes, move feet, able to feel touch. Strength is equal and strong. Updated Dr. Paz. No new orders received, will continue to monitor.

## 2019-04-23 NOTE — PLAN OF CARE
Pt. admitted from PACU at 2300 . Pt. arrived with personal belongings. Report was taken from Olivia ANNE RN on 10A.     Pt. is A&Ox4. Capnography activated. VSS. 02 sats are 98% on 2L. Lung sounds are clear bilaterally with both anterior and posterior. Bowel sounds are active in all 4 quadrants. CMS and Neuros are intact. Denies numbness and tingling in all extremities. Has pain at incisional site and given PCA dilaudid, ICE applied. Pt state pain is controlled. Pt. denies nausea, CP, SOB, lightheadedness, and dizziness. Pt is on a regular diet - pt just had sips of water overnight. BS hypoactive. Not passing flatus.     Back incisional dressing is C/D/I.  Hemovac drain is patent and has 0mL output. Givens Catheter is patent and had normal yellow colored urine output. (L) hand PIV is patent and infusing D5 with K+, (R) hand PIV patent and infusing PCA. PCDs are in place to BLEs. Pt. educated on use and purpose of the incentive spirometer. Pt. is oriented to the room and call light system and the call light is within reach. Continue to monitor.

## 2019-04-23 NOTE — OR NURSING
Dr Paz to bedside to assess pt.  Informed her that pt has both a ketamine gtt order and a dilaudid PCA order for the floor.  Dr Paz stated that Ketamine gtt is NOT going to be used on the floor and instead the PCA.

## 2019-04-23 NOTE — PROGRESS NOTES
"Orthopaedic Surgery Progress Note     Subjective: No acute events overnight. Pain well controlled-  Better than she expected it would be. Mild nausea last night; resolved without intervention. Givens in place. In PACU endorses some tingling in toes but today denies any numbness. Denies motor dysfunction or weakness.     Objective: /42 (BP Location: Right arm)   Pulse 67   Temp 98.2  F (36.8  C) (Oral)   Resp (!) 6   Ht 1.62 m (5' 3.78\")   Wt 62.2 kg (137 lb 2 oz)   SpO2 93%   BMI 23.70 kg/m      General: NAD, alert and oriented, cooperative with exam.   Cardio: RRR, extremities wwp.   Respiratory: Non-labored breathing.  MSK: Dressing c/d/i. DP 2+. SILT L3-S1 bilaterally.   L2-3: Hip flexion L and R 5/5 strength    L4:  Knee extension L and R 5/5 strength   L5:  Foot / EHL dorsiflexion L and R 5/5 strength   S1:  Plantarflexion  L and R 5/5 strength    Drain: 0 ml since surgery    Labs:   Recent Labs   Lab 04/22/19  1623   HGB 12.2     No results found for: SED    Assessment and Plan: Inge Son is a 64 year old s/p ALIF L5-S1, revision PISF L3-4 with extension to pelvis, SPO at L5-S1 with Dr. Armstrong. Complicated by intraoperative dural tear.     Ortho Primary  Activity: 36 hours flat bedrest (may turn to side) due to dural tear. After 36 hours can begin advancing per usual protocol. However if headache, return to flatbed rest. Once able to tolerate: Up with assist until independent. No excessive bending or twisting. No lifting >10 lbs x 6 weeks. No Karla lift for transfers.   Weight bearing status: WBAT.  Pain management: Transition from IV to PO as tolerated. No NSAIDs   Antibiotics: Ancef x 24 hours.  Diet: Begin with clear fluids and progress diet as tolerated.   DVT prophylaxis: SCDs only. No chemical DVT ppx needed.  Imaging: XR Upright lumbosacral spine PTDC - ordered.  Labs: Hgb POD#1-3.  Bracing/Splinting: None.  Dressings: Keep Aquacel c/d/i x 7 days.  Drains: Document output per shift, " will be discontinued at Orthopedic Surgery discretion.  Givens catheter: Remove POD#1 (today)  Physical Therapy/Occupational Therapy: Eval and treat.  Cultures: none  Consults: Hospitalist.  Follow-up: Clinic with Dr. Armstrong in 6 weeks with repeat x-rays.   Disposition: Pending progress with therapies, pain control on orals, and medical stability, anticipate discharge to home vs. TCU in 4-5 days    Patient discussed with Dr. Patti Paz, PGY4  [phone]

## 2019-04-23 NOTE — PLAN OF CARE
Pt on flat bedrest until tomorrow. CMS intact. PIV's infusing. PCA for pain control. Pt on 2lpm NC. Encouraged IS. Tolerating regular diet. Givens patent and draining. No output in drain. Dressing C/D/I. Pt rolls independently. Pt able to make needs known.

## 2019-04-23 NOTE — ANESTHESIA POSTPROCEDURE EVALUATION
Anesthesia POST Procedure Evaluation    Patient: Inge Son   MRN:     [id number] Gender:   female   Age:    64 year old :      1955        Preoperative Diagnosis: Spondylosis, Radiculopathy, Kyphosis   Procedure(s):  Anterior Lumbar Interbody Fusion Lumbar 5-Sacral 1  Posterior Intrumented Spinal Fusion and Escobar Quiroz Osteotomy Lumbar 5-Sacral 1, and Right Pelvic Fixation   Postop Comments: No value filed.       Anesthesia Type:  General  No value filed.    Reportable Event: NO     PAIN: Uncomplicated   Sign Out status: Comfortable, Well controlled pain     PONV: No PONV   Sign Out status:  No Nausea or Vomiting     Neuro/Psych: Uneventful perioperative course   Sign Out Status: Preoperative baseline; Age appropriate mentation     Airway/Resp.: Uneventful perioperative course   Sign Out Status: Non labored breathing, age appropriate RR; Resp. Status within EXPECTED Parameters     CV: Uneventful perioperative course   Sign Out status: Appropriate BP and perfusion indices; Appropriate HR/Rhythm     Disposition:   Sign Out in:  PACU  Disposition:  Phase II; Home  Recovery Course: Uneventful  Follow-Up: Not required           Last Anesthesia Record Vitals:  CRNA VITALS  2019 1948 - 20198      2019             Pulse:  108    SpO2:  100 %    Resp Rate (observed):  20          Last PACU Vitals:  Vitals Value Taken Time   /56 2019  9:15 PM   Temp     Pulse 75 2019  9:15 PM   Resp 14 2019  9:18 PM   SpO2 96 % 2019  9:18 PM   Temp src     NIBP     Pulse     SpO2     Resp     Temp     Ht Rate     Temp 2     Vitals shown include unvalidated device data.      Electronically Signed By: Rishi Tavarez DO, 2019, 9:19 PM

## 2019-04-23 NOTE — PROGRESS NOTES
[facility]    Medicine Progress Note - Hospitalist Service       Date of Admission:  4/22/2019  Assessment & Plan   Inge Son is a 64 year old s/p ALIF L5-S1, revision PISF L3-4 with extension to pelvis, SPO at L5-S1 with Dr. Armstrong. Complicated by intraoperative dural tear.  Patient admitted with 36 hrs of strict bed rest     #1  Lumbosacral spondylosis with radiculopathy, s/p and anterior Lumbar Interbody Fusion Lumbar 5-Sacral 1 Posterior Intrumented Spinal Fusion and Escobar Quiroz Osteotomy Lumbar 5-Sacral 1, and Right Pelvic Fixation with Dr. Armstrong and Dr. Bowling on 4/22  - Continue IV Dilaudid PCA for pain control ( May transition this to int remittent IV/ Oral pain medications)   --Nausea control with PRN zofran   --gabapentin 300mg BID  --tylenol PRN  --valium PRN     #2 HTN    Relative hypotension this morning. Asymptomatic   --Hold home hydrochlorothiazide and amlodipine for now and assess response given that she is on a PCA with normal blood pressure, would restart amlodipine first if needed      #3 Hypokalemia  --Potassium replacement orderset placed     #4 GERD  --TUMS PRN     #5 Ostenopenia   --Hold home alendronate           Diet: Advance Diet as Tolerated: Regular Diet Adult    DVT Prophylaxis: Pneumatic Compression Devices  Givens Catheter: in place, indication: Anesthesia  Code Status:     Disposition Plan   Expected discharge: 2 - 3 days, recommended to prior living arrangement once adequate pain management/ tolerating PO medications.  Entered: Huseyin Hernandez MD 04/23/2019, 9:44 AM       The patient's care was discussed with the Bedside Nurse, Care Coordinator/ and Patient.    Huseyin Hernandez MD  Hospitalist Service  [facility]    ______________________________________________________________________    Interval History   Inge feels well this morning. Lying flat in the bed.  Denies chest pain or SOB   has mild lower back pain, which is well controlled   no fever or chills  No diarrhea  No headache     Data reviewed today: I reviewed all medications, new labs and imaging results over the last 24 hours. I personally reviewed no images or EKG's today.    Physical Exam   Vital Signs: Temp: 96.6  F (35.9  C) Temp src: Oral BP: 103/44 Pulse: 67 Heart Rate: 68 Resp: 16 SpO2: 97 % O2 Device: Nasal cannula Oxygen Delivery: 2 LPM  Weight: 137 lbs 2.02 oz  General Appearance: Awake, alert. Not in distress   Respiratory: Clear breath sounds bilaterally  Cardiovascular: Normal heart sounds. No murmurs   GI:  Soft, non tender. Normal bowel sounds   Skin: No bruising or bleeding noted   Neuro:Alert and orientated X 3. No deficits     Data   Recent Labs   Lab 04/23/19  0720 04/22/19  2207 04/22/19  1623 04/22/19  1000   WBC 12.2*  --   --   --    HGB 10.2*  --  12.2  --    MCV 90  --   --   --    *  --   --   --      --  139  --    POTASSIUM 4.3 3.4 3.0* 4.8   CHLORIDE 106  --   --   --    CO2 30  --   --   --    BUN 12  --   --   --    CR 0.77  --   --   --    ANIONGAP 6  --   --   --    COMPA 8.1*  --   --   --    *  --  138* 101*

## 2019-04-23 NOTE — OR NURSING
PACU to Inpatient Nursing Handoff    Patient Inge Son is a 64 year old female who speaks English.   Procedure Procedure(s):  Anterior Lumbar Interbody Fusion Lumbar 5-Sacral 1  Posterior Intrumented Spinal Fusion and Escobar Quiroz Osteotomy Lumbar 5-Sacral 1, and Right Pelvic Fixation   Surgeon(s) Primary: Mahendra Armstrong MD  Assisting: Pati Bowling MD  Resident - Assisting: Lidia Paz MD     No Known Allergies    Isolation  [unfilled]     Past Medical History   has a past medical history of Chronic pain, Decreased hearing of left ear, GERD (gastroesophageal reflux disease), H/O CVA (cerebral vascular accident) (H), History of skin cancer, HTN (hypertension), Osteoarthritis, Osteopenia, and PONV (postoperative nausea and vomiting).    Anesthesia General   Dermatome Level     Preop Meds acetaminophen (Tylenol) - time given: 1044  gabapentin (Neurontin) - time given: 1045  scopolamine patch   Nerve block Not applicable   Intraop Meds dexamethasone (Decadron)  fentanyl (Sublimaze): 250 mcg total  ketamine (Ketalar): 37.67 mg given  ondansetron (Zofran): last given at 1937  Calcium Chloride 1gm   Local Meds Yes   Antibiotics cefazolin (Ancef) - last given at 1914     Pain Patient Currently in Pain: yes  Comfort: comfortably manageable  Pain Control: partially effective   PACU meds  fentanyl (Sublimaze): 125 mcg (total dose) last given at 2113   hydromorphone (Dilaudid): 0.7 mg (total dose) last given at 2215    PCA / epidural Yes. PCA - hydromorphone (Dilaudid)   Capnography Respiratory Monitoring (EtCO2): 44 mmHg   Telemetry ECG Rhythm: Sinus rhythm   Inpatient Telemetry Monitor Ordered? No        Labs Glucose Lab Results   Component Value Date     04/22/2019       Hgb Lab Results   Component Value Date    HGB 12.2 04/22/2019       INR Lab Results   Component Value Date    INR 1.00 04/10/2019      PACU Imaging Not applicable     Wound/Incision Incision/Surgical Site  12/12/17 Lower;Bilateral Back (Active)   Number of days: 496       Incision/Surgical Site 04/22/19 Left Abdomen (Active)   Incision Assessment [facility] 4/22/2019  8:50 PM   Shelly-Incision Assessment [facility] 4/22/2019  8:50 PM   Closure Approximated;Liquid bandage;Sutures 4/22/2019  3:21 PM   Incision Drainage Amount None 4/22/2019  8:50 PM   Dressing Intervention Clean, dry, intact 4/22/2019  8:50 PM   Number of days: 0       Incision/Surgical Site 04/22/19 Back (Active)   Incision Assessment [facility] 4/22/2019  8:50 PM   Shelly-Incision Assessment [facility] 4/22/2019  8:50 PM   Closure Approximated;Sutures;Liquid bandage 4/22/2019  7:56 PM   Incision Drainage Amount None 4/22/2019  8:50 PM   Dressing Intervention Clean, dry, intact 4/22/2019  8:50 PM   Number of days: 0      CMS        Equipment Not applicable   Other LDA       IV Access Peripheral IV 04/22/19 Posterior;Left Hand (Active)   Site Assessment [facility] 4/22/2019  8:20 PM   Line Status Infusing 4/22/2019  8:20 PM   Phlebitis Scale 0-->no symptoms 4/22/2019  8:20 PM   Infiltration Scale 0 4/22/2019  8:20 PM   Extravasation? No 4/22/2019  8:20 PM   Number of days: 0       Peripheral IV Right Hand (Active)   Site Assessment [facility] 4/22/2019  8:20 PM   Line Status Saline locked 4/22/2019  8:20 PM   Phlebitis Scale 0-->no symptoms 4/22/2019  8:20 PM   Infiltration Scale 0 4/22/2019  8:20 PM   Extravasation? No 4/22/2019  8:20 PM   Number of days:       Blood Products Albumin  mL   Intake/Output Date 04/22/19 0700 - 04/23/19 0659   Shift 0047-7478 7769-8670 6299-9051 24 Hour Total   INTAKE   I.V.  2100  2100   Colloid  500  500   Shift Total(mL/kg)  2600(41.8)  2600(41.8)   OUTPUT   Urine  950  950   Drains  0  0   Blood  500  500   Shift Total(mL/kg)  1450(23.31)  1450(23.31)   Weight (kg) 62.2 62.2 62.2 62.2      Drains / Givens Closed/Suction Drain 1 Right Back Accordion 10 Khmer (Active)   Site Description [facility] 4/22/2019  8:20 PM   Dressing Status Normal: Clean, Dry & Intact  4/22/2019  8:20 PM   Status To bulb suction 4/22/2019  8:20 PM   Output (ml) 0 ml 4/22/2019  9:45 PM   Number of days: 0       Urethral Catheter Latex 16 fr (Active)   Collection Container Standard 4/22/2019  8:20 PM   Securement Method Securing device (Describe) 4/22/2019  8:20 PM   Rationale for Continued Use Anesthesia 4/22/2019  8:20 PM   Urine Output 100 mL 4/22/2019  9:45 PM   Number of days: 0      Time of void PreOp Void Prior to Procedure: 1029 (04/22/19 1029)    PostOp      Diapered? No   Bladder Scan     PO    tolerating sips and ice chips     Vitals    B/P: 113/58  T: 98.6  F (37  C)    Temp src: Oral  P:  Pulse: 74 (04/22/19 2130)    Heart Rate: 90 (04/22/19 2145)     R: (!) 6  O2:  SpO2: 97 %    O2 Device: Nasal cannula (04/22/19 2115)    Oxygen Delivery: 4 LPM (04/22/19 2115)         Family/support present -not here   Patient belongings     Patient transported on bed   DC meds/scripts (obs/outpt) Not applicable   Inpatient Pain Meds Released? Yes       Special needs/considerations orders for flat bedrest (ok to turn side to side) until wednesday morning (36hrs).  Potassium lab in OR was 3.0. Recheck at 2207 was 3.4, updated MD, no orders received.    Tasks needing completion None       Dania Colbert RN  ASCOM [id number]

## 2019-04-23 NOTE — BRIEF OP NOTE
[facility], [place]    Brief Operative Note    Pre-operative diagnosis: Spondylosis, Radiculopathy, Kyphosis  Post-operative diagnosis * No post-op diagnosis entered *  Procedure: Procedure(s):  Part 1: (supine) Anterior Lumbar Interbody Fusion Lumbar 5-Sacral 1 and Use Of BMP Small Kit  Part 2 (prone) Posterior Intrumented Spinal Fusion-Escobar Quiroz Osteotomy Lumbar 5-Sacral 1 and Possible Pelvic Fixation  Surgeon: Surgeon(s) and Role:  Panel 1:     * Mahendra Armstrong MD - Primary     * Pati Bowling MD - Assisting     * Lidia Paz MD - Resident - Assisting  Panel 2:     * Mahendra Armstrong MD - Primary     * Lidia Paz MD - Resident - Assisting  Anesthesia: General   Estimated blood loss: 500 ml  Drains: Hemovac  Specimens: * No specimens in log *  Findings:   See dictated op note.  Complications: Dural tear at level of sacrum.  Implants:    Implant Name Type Inv. Item Serial No.  Lot No. LRB No. Used   GRAFT BONE INFUSE BMP  [id number] Bone/Tissue/Biologic GRAFT BONE INFUSE BMP  [id number]  [employer] [id number] N/A 1   GRAFT BONE CRUSH CANC 30ML [id number] Bone/Tissue/Biologic GRAFT BONE CRUSH CANC 30ML [id number] MUSCULOSKELETAL MEJÍA  N/A 1   IMP BASE Ti 8 X 42 X 30, 15DEG Metallic Hardware/[place]     N/A 1   IMP SCR NUVASIVE RELINE-O 7.5X45MM S2 POLYAXIAL [id number] Metallic Hardware/[place] IMP SCR NUVASIVE RELINE-O 7.5X45MM S2 POLYAXIAL [id number]  NUVASIVE  N/A 5   RELINE LOCK SCREW 5.5 MM    NUVASIVE  N/A 6   RELINE SCREW 9.5 X 90 MM S2 ILLIAC    NUVASIVE  N/A 1   GRAFT DURAGEN 1X1&quot; ID-110 Bone/Tissue/Biologic GRAFT DURAGEN 1X1&quot; ID-110  [employer] [id number] N/A 1   GRAFT BONE MAGNIFUSE [id number] Bone/Tissue/Biologic GRAFT BONE MAGNIFUSE [id number] [employer]  N/A 1   GRAFT BONE MAGNIFUSE [id number] Bone/Tissue/Biologic  GRAFT BONE MAGNIFUSE [id number] [employer]  N/A 1        Assessment and Plan: Inge Son is a 64 year old s/p ALIF L5-S1, revision PISF L3-4 with extension to pelvis, SPO at L5-S1 with Dr. Armstrong.     Ortho Primary  Activity: 36 hours flat bedrest (may turn to side) due to dural tear. After 36 hours can begin advancing per usual protocol. However if headache, return to flatbed rest. Once able to tolerate: Up with assist until independent. No excessive bending or twisting. No lifting >10 lbs x 6 weeks. No Karla lift for transfers.   Weight bearing status: WBAT.  Pain management: Transition from IV to PO as tolerated. No NSAIDs   Antibiotics: Ancef x 24 hours.  Diet: Begin with clear fluids and progress diet as tolerated.   DVT prophylaxis: SCDs only. No chemical DVT ppx needed.  Imaging: XR Upright lumbosacral spine PTDC - ordered.  Labs: Hgb POD#1-3.  Bracing/Splinting: None.  Dressings: Keep Aquacel c/d/i x 7 days.  Drains: Document output per shift, will be discontinued at Orthopedic Surgery discretion.  Givens catheter: Remove POD#1.   Physical Therapy/Occupational Therapy: Eval and treat.  Cultures: none  Consults: Hospitalist.  Follow-up: Clinic with Dr. Armstrong in 6 weeks with repeat x-rays.   Disposition: Pending progress with therapies, pain control on orals, and medical stability, anticipate discharge to home vs. TCU in 4-5 days

## 2019-04-23 NOTE — PLAN OF CARE
PT10A: PT orders received and acknowledged, per chart review current activity orders for bedrest until Wednesday 4/24 am. Will re-schedule PT eval for 4/24.

## 2019-04-23 NOTE — OP NOTE
DATE OF OPERATION: April 22, 2019      CO-SURGEONS:  Mahendra Armstrong MD; Pati Bowling MD.       ASSISTANT: Lidia Paz MD and Michael Quiroz MS4      ANESTHESIA:  General endotracheal anesthesia.       PREOPERATIVE DIAGNOSIS:  L5-S1 spondylosis      POSTOPERATIVE DIAGNOSIS: L5-S1 spondylosis      PROCEDURE:     1.  L5-S1 oblique lumbar intradiscal fusion (OLIF)  2.  Retroperitoneal exposure.       ESTIMATED BLOOD LOSS:  100 mL.       COMPLICATIONS:  None apparent.       INDICATIONS:  This 64 year old female saw Dr. Armstrong of Spine Surgery for evaluation.  Treatment was recommended, which included anterior exposure for the  L5-S1 disk spaces.  I was consulted for exposure via retroperitoneal exposure.      DESCRIPTION OF PROCEDURE:  The patient was brought to the operating room, placed in the supine position.  After monitors were placed general anesthesia was achieved.  The patient was placed in a right lateral decubitus position. The L5-S1 disc space was identified under fluoroscopy. A left paramedian incision was created and the anterior rectus sheath incised along with the external oblique fascia. The retroperitoneal space was entered by going directly through the fibers of the external oblique muscle. The peritoneum was reflected off of the pelvic side wall and reflected toward the right.  A self-retaining radiolucent retractor was then placed.  The L5-S1 disk space was then encountered and the middle sacral artery divided with the cautery.  The disk space between L5 and S1, the L5 vertebral body, and S1 vertebral body were then cleared cephalad and caudally as well as laterally.  The disk and fusion portion as well as the instrumentation for the L5-S1 disk space will then be dictated by Dr. Armstrong.   The retractors were released and hemostasis appeared adequate. The area was again inspected for hemostasis and when this was assured, the external oblique and anterior rectus fascia were was closed  with looped #0 PDS.  Interrupted 3-0 Vicryl sutures were then used for deep dermal followed by 4-0 subcuticular Monocryl followed by skin glue.  Prior to closure, 20 mL of 0.25% Marcaine was injected into the incision.  At this point, the remainder of the operation and fusion will be dictated by Dr. Armstrnog.  When I left the operating room, the patient was hemodynamically stable.          Pati Bowling MD, FACS, RPVI  Professor and Chief, Vascular and Endovascular Surgery  [facility]  Cell: [phone]  [email or web address]

## 2019-04-24 ENCOUNTER — APPOINTMENT (OUTPATIENT)
Dept: OCCUPATIONAL THERAPY | Facility: CLINIC | Age: 64
DRG: 454 | End: 2019-04-24
Attending: STUDENT IN AN ORGANIZED HEALTH CARE EDUCATION/TRAINING PROGRAM
Payer: COMMERCIAL

## 2019-04-24 ENCOUNTER — APPOINTMENT (OUTPATIENT)
Dept: PHYSICAL THERAPY | Facility: CLINIC | Age: 64
DRG: 454 | End: 2019-04-24
Attending: STUDENT IN AN ORGANIZED HEALTH CARE EDUCATION/TRAINING PROGRAM
Payer: COMMERCIAL

## 2019-04-24 LAB
GLUCOSE BLDC GLUCOMTR-MCNC: 86 MG/DL (ref 70–99)
LACTATE BLD-SCNC: 0.5 MMOL/L (ref 0.7–2)

## 2019-04-24 PROCEDURE — 25800030 ZZH RX IP 258 OP 636: Performed by: STUDENT IN AN ORGANIZED HEALTH CARE EDUCATION/TRAINING PROGRAM

## 2019-04-24 PROCEDURE — 97530 THERAPEUTIC ACTIVITIES: CPT | Mod: GO

## 2019-04-24 PROCEDURE — 97535 SELF CARE MNGMENT TRAINING: CPT | Mod: GO

## 2019-04-24 PROCEDURE — 12000001 ZZH R&B MED SURG/OB UMMC

## 2019-04-24 PROCEDURE — 97161 PT EVAL LOW COMPLEX 20 MIN: CPT | Mod: GP

## 2019-04-24 PROCEDURE — 25000132 ZZH RX MED GY IP 250 OP 250 PS 637: Performed by: STUDENT IN AN ORGANIZED HEALTH CARE EDUCATION/TRAINING PROGRAM

## 2019-04-24 PROCEDURE — 97116 GAIT TRAINING THERAPY: CPT | Mod: GP

## 2019-04-24 PROCEDURE — 97530 THERAPEUTIC ACTIVITIES: CPT | Mod: GP

## 2019-04-24 PROCEDURE — 97165 OT EVAL LOW COMPLEX 30 MIN: CPT | Mod: GO

## 2019-04-24 PROCEDURE — 25000128 H RX IP 250 OP 636: Performed by: STUDENT IN AN ORGANIZED HEALTH CARE EDUCATION/TRAINING PROGRAM

## 2019-04-24 PROCEDURE — 00000146 ZZHCL STATISTIC GLUCOSE BY METER IP

## 2019-04-24 PROCEDURE — 99232 SBSQ HOSP IP/OBS MODERATE 35: CPT | Performed by: INTERNAL MEDICINE

## 2019-04-24 PROCEDURE — 25000132 ZZH RX MED GY IP 250 OP 250 PS 637: Performed by: INTERNAL MEDICINE

## 2019-04-24 PROCEDURE — 36415 COLL VENOUS BLD VENIPUNCTURE: CPT | Performed by: INTERNAL MEDICINE

## 2019-04-24 PROCEDURE — 83605 ASSAY OF LACTIC ACID: CPT | Performed by: INTERNAL MEDICINE

## 2019-04-24 RX ORDER — AMLODIPINE BESYLATE 5 MG/1
5 TABLET ORAL DAILY
Status: DISCONTINUED | OUTPATIENT
Start: 2019-04-24 | End: 2019-04-26 | Stop reason: HOSPADM

## 2019-04-24 RX ADMIN — POTASSIUM CHLORIDE, DEXTROSE MONOHYDRATE AND SODIUM CHLORIDE: 150; 5; 450 INJECTION, SOLUTION INTRAVENOUS at 03:00

## 2019-04-24 RX ADMIN — OXYCODONE HYDROCHLORIDE 10 MG: 5 TABLET ORAL at 14:19

## 2019-04-24 RX ADMIN — OXYCODONE HYDROCHLORIDE 10 MG: 5 TABLET ORAL at 21:08

## 2019-04-24 RX ADMIN — ACETAMINOPHEN 975 MG: 325 TABLET, FILM COATED ORAL at 13:12

## 2019-04-24 RX ADMIN — SENNOSIDES AND DOCUSATE SODIUM 2 TABLET: 8.6; 5 TABLET ORAL at 21:08

## 2019-04-24 RX ADMIN — ACETAMINOPHEN 975 MG: 325 TABLET, FILM COATED ORAL at 21:08

## 2019-04-24 RX ADMIN — ACETAMINOPHEN 975 MG: 325 TABLET, FILM COATED ORAL at 04:10

## 2019-04-24 RX ADMIN — OXYCODONE HYDROCHLORIDE 10 MG: 5 TABLET ORAL at 17:54

## 2019-04-24 RX ADMIN — AMLODIPINE BESYLATE 5 MG: 5 TABLET ORAL at 14:19

## 2019-04-24 RX ADMIN — Medication 0.5 MG: at 10:02

## 2019-04-24 RX ADMIN — GABAPENTIN 300 MG: 100 CAPSULE ORAL at 07:52

## 2019-04-24 RX ADMIN — GABAPENTIN 300 MG: 100 CAPSULE ORAL at 21:08

## 2019-04-24 RX ADMIN — OXYCODONE HYDROCHLORIDE 5 MG: 5 TABLET ORAL at 04:19

## 2019-04-24 RX ADMIN — POLYETHYLENE GLYCOL 3350 17 G: 17 POWDER, FOR SOLUTION ORAL at 07:50

## 2019-04-24 RX ADMIN — OXYCODONE HYDROCHLORIDE 5 MG: 5 TABLET ORAL at 01:33

## 2019-04-24 RX ADMIN — CYANOCOBALAMIN TAB 1000 MCG 1000 MCG: 1000 TAB at 17:54

## 2019-04-24 RX ADMIN — OXYCODONE HYDROCHLORIDE 10 MG: 5 TABLET ORAL at 07:53

## 2019-04-24 RX ADMIN — PSYLLIUM HUSK 1 PACKET: 3.4 POWDER ORAL at 07:51

## 2019-04-24 RX ADMIN — RANITIDINE 150 MG: 150 TABLET ORAL at 21:08

## 2019-04-24 RX ADMIN — RANITIDINE 150 MG: 150 TABLET ORAL at 07:53

## 2019-04-24 RX ADMIN — SENNOSIDES AND DOCUSATE SODIUM 2 TABLET: 8.6; 5 TABLET ORAL at 07:52

## 2019-04-24 RX ADMIN — OXYCODONE HYDROCHLORIDE 10 MG: 5 TABLET ORAL at 11:00

## 2019-04-24 ASSESSMENT — ACTIVITIES OF DAILY LIVING (ADL)
ADLS_ACUITY_SCORE: 15

## 2019-04-24 NOTE — PROGRESS NOTES
04/24/19 1400   Quick Adds   Type of Visit Initial Occupational Therapy Evaluation   Living Environment   Lives With spouse   Living Arrangements house   Home Accessibility stairs to enter home;stairs within home   Number of Stairs, Main Entrance 2   Transportation Anticipated family or friend will provide   Living Environment Comment Patient lives with spouse in home - both retired and she will have assistance all day. Standard toilets. walk in shower with shower chair.    Self-Care   Usual Activity Tolerance good   Current Activity Tolerance fair   Regular Exercise No   Functional Level   Ambulation 0-->independent   Transferring 0-->independent   Toileting 0-->independent   Bathing 0-->independent   Dressing 0-->independent   Eating 0-->independent   Communication 0-->understands/communicates without difficulty   Swallowing 0-->swallows foods/liquids without difficulty   Cognition 0 - no cognition issues reported   Fall history within last six months no   General Information   Onset of Illness/Injury or Date of Surgery - Date 04/22/19   Referring Physician Lidia Paz MD   Patient/Family Goals Statement Did not endorse, in agreement with OT related goals.    Additional Occupational Profile Info/Pertinent History of Current Problem Inge Son is a 64 year old s/p ALIF L5-S1, revision PISF L3-4 with extension to pelvis, SPO at L5-S1 with Dr. Armstrong. Complicated by intraoperative dural tear and 36 hrs of bedrest.    Precautions/Limitations fall precautions;spinal precautions;oxygen therapy device and L/min   Weight-Bearing Status - LUE   (no lifting >10 lbs)   Weight-Bearing Status - RUE   (no lifting >10 lbs)   General Observations sleepy   Cognitive Status Examination   Orientation orientation to person, place and time   Level of Consciousness lethargic/somnolent   Follows Commands (Cognition) WNL   Memory intact   Visual Perception   Visual Perception No deficits were identified    Sensory Examination   Sensory Comments Deaf in L ear   Pain Assessment   Patient Currently in Pain Yes, see Vital Sign flowsheet   Range of Motion (ROM)   ROM Comment B UE's WFL   Strength   Strength Comments not formally tested secondary to precautions. Overall decreased strength post op.    Muscle Tone Assessment   Muscle Tone Quick Adds No deficits were identified   Coordination   Upper Extremity Coordination No deficits were identified   Mobility   Bed Mobility Comments Supine>sit using log roll with SBA and bedrail, sit>supine with min A using log roll technique   Transfer Skill: Bed to Chair/Chair to Bed   Level of [place]: Bed to Chair contact guard   Physical Assist/Nonphysical Assist: Bed to Chair set-up required;verbal cues   Weight-Bearing Restrictions weight-bearing as tolerated   Assistive Device - Transfer Skill Bed to Chair Chair to Bed Rehab Eval rolling walker   Transfer Skill: Sit to Stand   Level of [place]: Sit/Stand contact guard   Physical Assist/Nonphysical Assist: Sit/Stand set-up required;verbal cues   Transfer Skill: Sit to Stand weight-bearing as tolerated   Assistive Device for Transfer: Sit/Stand rolling walker   Toilet Transfer   Toilet Transfer Comments Did not observe during eval    Upper Body Dressing   Level of [place]: Dress Upper Body minimum assist (75% patients effort)   Physical Assist/Nonphysical Assist: Dress Upper Body set-up required   Lower Body Dressing   Level of [place]: Dress Lower Body moderate assist (50% patients effort)   Physical Assist/Nonphysical Assist: Dress Lower Body set-up required   Toileting   Level of [place]: Toilet   (not assessed)   Eating/Self Feeding   Level of [place]: Eating independent   Physical Assist/Nonphysical Assist: Eating set-up required   Activities of Daily Living Analysis   Impairments Contributing to Impaired Activities of Daily Living balance impaired;pain;post surgical precautions;ROM  "decreased;strength decreased   General Therapy Interventions   Planned Therapy Interventions ADL retraining;transfer training   Clinical Impression   Criteria for Skilled Therapeutic Interventions Met yes, treatment indicated   OT Diagnosis Decreased functional mobility and ADLs   Influenced by the following impairments pain, post op precautions, decreased strength post op   Assessment of Occupational Performance 3-5 Performance Deficits   Identified Performance Deficits dressing, bathing, toileting, transfers, home mgmt   Clinical Decision Making (Complexity) Low complexity   Therapy Frequency daily   Predicted Duration of Therapy Intervention (days/wks) 3 days   Anticipated Equipment Needs at Discharge dressing equipment  (TBD)   Anticipated Discharge Disposition Home with Assist   Risks and Benefits of Treatment have been explained. Yes   Patient, Family & other staff in agreement with plan of care Yes   [facility] TM \"6 Clicks\"   2016, Trustees of [facility], under license to [employer].  All rights reserved.   6 Clicks Short Forms Daily Activity Inpatient Short Form   [facility]  \"6 Clicks\" Daily Activity Inpatient Short Form   1. Putting on and taking off regular lower body clothing? 2 - A Lot   2. Bathing (including washing, rinsing, drying)? 2 - A Lot   3. Toileting, which includes using toilet, bedpan or urinal? 3 - A Little   4. Putting on and taking off regular upper body clothing? 3 - A Little   5. Taking care of personal grooming such as brushing teeth? 4 - None   6. Eating meals? 4 - None   Daily Activity Raw Score (Score out of 24.Lower scores equate to lower levels of function) 18   Total Evaluation Time   Total Evaluation Time (Minutes) 8     "

## 2019-04-24 NOTE — PLAN OF CARE
PT: Cancel PM session - Attempted supine LE strengthening, however, patient very drowsy and falling asleep with PT attempts. Will continue to see BID tomorrow.

## 2019-04-24 NOTE — PLAN OF CARE
Discharge Planner PT   Patient plan for discharge: Home with assist  Current status: During session patient reporting dizziness with transition changes that improves with time and lightheadedness that worsens with ambulation. BP @ 119/59 in sitting and @ 110/50 following ambulation. Patient on 2L of O2 during session as sats dropping to 89% on RA. Patient completes bed mobility with SBA using log roll technique, sit <> stand with CGA and ambulation with FWW with CGA. Patient ambulating ~75' with FWW - mainly limited due to lightheadedness.  Barriers to return to prior living situation: medical status. 02 needs.   Recommendations for discharge: Home with walking program  Rationale for recommendations: Patient would benefit from continued skilled PT intervention to allow for continued education on proper body mechanics and posture with functional mobility and to ensure safe discharge to home from a mobility perspective.        Entered by: Irina Thompson 04/24/2019 12:22 PM

## 2019-04-24 NOTE — PLAN OF CARE
"  VS: /53 (BP Location: Right arm)   Pulse 67   Temp 98.2  F (36.8  C) (Oral)   Resp 16   Ht 1.62 m (5' 3.78\")   Wt 62.2 kg (137 lb 2 oz)   SpO2 98%   BMI 23.70 kg/m      O2: SpO2 98% on 3L N[place]. Denies shortness of breath. Lung sounds clear. CAPNO   Output: Pt has vera catheter in. Good output.    Last BM: Last Bm: 4/20/2019. Pt given senokot and encouraged to drink fluids. Bowel sounds audible in all 4 quadrants   Activity: Pt is on bedrest till 0800 on 4/24/2019. PT is able to turn independently.    Skin: Skin is intact besides incision. Incision on (L) flank is intact. MANUELA. Wound is approximated. Incision on back WILBERT.   Pain: Pt complained of pain during beginning of shift. Pt discontinued from PCA pump. PRN oxy 5mg given x1. Pain has been controlled. Pt has been sleeping after medication given.    CMS: Intact. Alert and oriented x4.    Dressing: Dressing on back is CDI.    Diet: Regular.    LDA: Pt has (L) PIV currently infusing.  Pt has (R) PIV saline locked. Flushes easily.    Pt has a hemovac coming from wound in back. Hooked to accordion suction. 0 mL of output.    Equipment: Hemovac, CAPNO, IV pole, call light and phone within reach.   Plan: Continue with POC.    Additional Info:       "

## 2019-04-24 NOTE — PROGRESS NOTES
[facility]    Medicine Progress Note - Hospitalist Service       Date of Admission:  4/22/2019  Assessment & Plan   Inge Son is a 64 year old s/p ALIF L5-S1, revision PISF L3-4 with extension to pelvis, SPO at L5-S1 with Dr. Armstrong. Complicated by intraoperative dural tear.  Patient admitted with 36 hrs of strict bed rest     #1  Lumbosacral spondylosis with radiculopathy, s/p and anterior Lumbar Interbody Fusion Lumbar 5-Sacral 1 Posterior Intrumented Spinal Fusion and Escobar Quiroz Osteotomy Lumbar 5-Sacral 1, and Right Pelvic Fixation with Dr. Armstrong and Dr. Bowling on 4/22  - Transition this to int remittent IV/ Oral pain medications. Stop PCA dilaudid  --Nausea control with PRN zofran   --gabapentin 300mg BID  --tylenol PRN  --valium PRN  - Givens catheter to be out today-Patient to be up today after 36 hrs of bed rest   --IV fluids to be stopped today      #2 HTN    Relative hypotension this morning. Asymptomatic   --Hold home hydrochlorothiazide  for now and assess response    would restart amlodipine  With hold parameters      #3 Hypokalemia  --Potassium replacement orderset placed     #4 GERD  --TUMS PRN     #5 Ostenopenia   --Hold home alendronate           Diet: Advance Diet as Tolerated: Regular Diet Adult    DVT Prophylaxis: Pneumatic Compression Devices  Givens Catheter: not present  Code Status:     Disposition Plan   Expected discharge: 2 - 3 days, recommended to prior living arrangement once adequate pain management/ tolerating PO medications.  Entered: Huseyin Hernandez MD 04/24/2019, 1:49 PM       The patient's care was discussed with the Bedside Nurse, Care Coordinator/ and Patient.    Huseyin Hernandez MD  Hospitalist Service  [facility]    ______________________________________________________________________    Interval History    feels well today. Pain controlled   No  headache   No fevers or chills  Tolerating diet well     Data reviewed today: I reviewed all medications, new labs and imaging results over the last 24 hours. I personally reviewed no images or EKG's today.    Physical Exam   Vital Signs: Temp: 97  F (36.1  C) Temp src: Oral BP: 123/51   Heart Rate: 82 Resp: 16 SpO2: 97 % O2 Device: Nasal cannula Oxygen Delivery: 2 LPM  Weight: 137 lbs 2.02 oz  General Appearance: Awake, alert. Not in distress   Respiratory: Clear breath sounds bilaterally  Cardiovascular: Normal heart sounds. No murmurs   GI:  Soft, non tender. Normal bowel sounds   Skin: No bruising or bleeding noted   Neuro:Alert and orientated X 3. No deficits     Data   Recent Labs   Lab 04/23/19  0720 04/22/19  2207 04/22/19  1623 04/22/19  1000   WBC 12.2*  --   --   --    HGB 10.2*  --  12.2  --    MCV 90  --   --   --    *  --   --   --      --  139  --    POTASSIUM 4.3 3.4 3.0* 4.8   CHLORIDE 106  --   --   --    CO2 30  --   --   --    BUN 12  --   --   --    CR 0.77  --   --   --    ANIONGAP 6  --   --   --    COMPA 8.1*  --   --   --    *  --  138* 101*

## 2019-04-24 NOTE — PLAN OF CARE
Discharge Planner OT   Patient plan for discharge: home with assist  Current status: Patient sleepy upon arrival, on 2L O2.  present. 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 Supine>sit using log roll technique with SBA and bedrail. Patient stood with CGA and took a few steps using FWW. Patient sleepy and slightly unsteady with standing. BP sitting /61, BP following activity 122/50.   Barriers to return to prior living situation: pain, post op precautions, medical status  Recommendations for discharge: anticipate home with assist  Rationale for recommendations: will continue with daily OT to maximize safety and IND with functional mobility and ADLs       Entered by: DAILY ENRIQUEZ HELD 04/24/2019 2:13 PM

## 2019-04-24 NOTE — PLAN OF CARE
HOB elevated and no symptoms of unresolved dural tear present. CMS intact. Hemovac patent. Givens removed at 1140 - pt DTV. Pain meds given as available. Tolerating regular diet. Passing flatus, no BM. Up with assist of 1 and walker. PIV saline locked. Pt able to make needs known.

## 2019-04-24 NOTE — PROGRESS NOTES
04/24/19 1030   Quick Adds   Type of Visit Initial PT Evaluation   Living Environment   Lives With spouse   Living Arrangements house   Home Accessibility stairs to enter home;stairs within home   Number of Stairs, Main Entrance 2   Stair Railings, Main Entrance none   Number of Stairs, Within Home, Primary other (see comments)  (15)   Stair Railings, Within Home, Primary railing on left side (ascending)   Transportation Anticipated family or friend will provide   Living Environment Comment Patient lives with spouse in home - both retired and she will have assistance all day.    Self-Care   Usual Activity Tolerance good   Current Activity Tolerance fair   Regular Exercise No   Activity/Exercise/Self-Care Comment Patient reporting that prior to surgery the outside of her R leg with burn with activity - leading to patient being limited with ambulation.    Functional Level Prior   Ambulation 0-->independent   Transferring 0-->independent   Toileting 0-->independent   Bathing 0-->independent   Communication 0-->understands/communicates without difficulty   Swallowing 0-->swallows foods/liquids without difficulty   Cognition 0 - no cognition issues reported   Fall history within last six months no   Prior Functional Level Comment Patient IND with all functional mobility prior to surgery - occassionally would use cane outside the home due to pain.    General Information   Onset of Illness/Injury or Date of Surgery - Date 04/22/19   Referring Physician Lidia Paz MD   Patient/Family Goals Statement return to home   Pertinent History of Current Problem (include personal factors and/or comorbidities that impact the POC) Inge Son is a 64 year old s/p ALIF L5-S1, revision PISF L3-4 with extension to pelvis, SPO at L5-S1 with Dr. Armstrong. Complicated by intraoperative dural tear.   Precautions/Limitations fall precautions;spinal precautions   General Observations 2L of 02 at rest and with activity.     General Info Comments Activity: up with assist   Cognitive Status Examination   Orientation orientation to person, place and time   Level of Consciousness alert   Follows Commands and Answers Questions 100% of the time   Personal Safety and Judgment intact   Memory intact   Pain Assessment   Patient Currently in Pain Yes, see Vital Sign flowsheet   Integumentary/Edema   Integumentary/Edema no deficits were identifed   Posture    Posture Not impaired   Range of Motion (ROM)   ROM Comment WFL   Strength   Strength Comments Did not formally assess due to recent surgery. Patient demonstrates at least 3+/5 in bilateral LEs based on ability to move limbs against gravity. Patient demonstrates functional strength based on her ability to ambulate with FWW.    Bed Mobility   Bed Mobility Comments CGA - log roll technique with cueing   Transfer Skills   Transfer Comments CGA - cueing to take breaks between transitions due to dizziness.    Gait   Gait Comments CGA with FWW - increasing lightheadedness towards end of ambulation.    Balance   Balance Comments Unsteady due to lightheadedness   Sensory Examination   Sensory Perception Comments No reports of numbness or tingling in LEs   General Therapy Interventions   Planned Therapy Interventions transfer training;gait training;bed mobility training;balance training;strengthening   Clinical Impression   Criteria for Skilled Therapeutic Intervention yes, treatment indicated   PT Diagnosis impaired mobility   Influenced by the following impairments pain. spinal precautions. strength. gait mechanics.   Functional limitations due to impairments bed mobility, transfers and ambulation   Clinical Presentation Stable/Uncomplicated   Clinical Presentation Rationale chart review and clinical judgement   Clinical Decision Making (Complexity) Low complexity   Therapy Frequency` 2 times/day   Predicted Duration of Therapy Intervention (days/wks) 1 week   Anticipated Equipment Needs at  "Discharge front wheeled walker   Anticipated Discharge Disposition Home with Assist   Risk & Benefits of therapy have been explained Yes   Patient, Family & other staff in agreement with plan of care Yes   Clinical Impression Comments Patient would benefit from continued skilled PT intervention to progress safety with all aspects of functional mobility.    [facility] TM \"6 Clicks\"   2016, Trustees of [facility], under license to [employer].  All rights reserved.   6 Clicks Short Forms Basic Mobility Inpatient Short Form   [facility]  \"6 Clicks\" V.2 Basic Mobility Inpatient Short Form   1. Turning from your back to your side while in a flat bed without using bedrails? 4 - None   2. Moving from lying on your back to sitting on the side of a flat bed without using bedrails? 3 - A Little   3. Moving to and from a bed to a chair (including a wheelchair)? 3 - A Little   4. Standing up from a chair using your arms (e.g., wheelchair, or bedside chair)? 3 - A Little   5. To walk in hospital room? 3 - A Little   6. Climbing 3-5 steps with a railing? 3 - A Little   Basic Mobility Raw Score (Score out of 24.Lower scores equate to lower levels of function) 19   Total Evaluation Time   Total Evaluation Time (Minutes) 8     "

## 2019-04-24 NOTE — PROGRESS NOTES
"Orthopaedic Surgery Progress Note     Subjective: No acute events overnight. Pain well controlled-  Pain in back of head- but then realized she had been laying on nasal canula tubing in the sensitive area. Discussed alerting nurse if she has headache. Plan to advance activity today.    Objective: /51 (BP Location: Right arm)   Pulse 67   Temp 97  F (36.1  C) (Oral)   Resp 16   Ht 1.62 m (5' 3.78\")   Wt 62.2 kg (137 lb 2 oz)   SpO2 97%   BMI 23.70 kg/m      General: NAD, alert and oriented, cooperative with exam.   Cardio: RRR, extremities wwp.   Respiratory: Non-labored breathing.  MSK: Dressing c/d/i. DP 2+. SILT L3-S1 bilaterally.   L2-3: Hip flexion L and R 5/5 strength    L4:  Knee extension L and R 5/5 strength   L5:  Foot / EHL dorsiflexion L and R 5/5 strength   S1:  Plantarflexion  L and R 5/5 strength    Drain: 10 ml last 24 hours    Labs:   Recent Labs   Lab 04/23/19  0720 04/22/19  1623   WBC 12.2*  --    HGB 10.2* 12.2   *  --      No results found for: SED    Assessment and Plan: Inge Son is a 64 year old s/p ALIF L5-S1, revision PISF L3-4 with extension to pelvis, SPO at L5-S1 with Dr. Armstrong. Complicated by intraoperative dural tear.     Ortho Primary  Activity: 36 hours flat bedrest (may turn to side) due to dural tear. This morning (4/24/19) begin advancing per usual protocol. However if headache, return to flatbed rest. Once able to tolerate: Up with assist until independent. No excessive bending or twisting. No lifting >10 lbs x 6 weeks. No Karla lift for transfers.   Weight bearing status: WBAT.  Pain management: Transition from IV to PO as tolerated. No NSAIDs   Antibiotics: Ancef x 24 hours.  Diet: Begin with clear fluids and progress diet as tolerated.   DVT prophylaxis: SCDs only. No chemical DVT ppx needed.  Imaging: XR Upright lumbosacral spine PTDC - ordered.  Labs: Hgb POD#1-3.  Bracing/Splinting: None.  Dressings: Keep Aquacel c/d/i x 7 days.  Drains: Document " output per shift, will be discontinued at Orthopedic Surgery discretion.  Vera catheter: Remove vera today  Physical Therapy/Occupational Therapy: Eval and treat.  Cultures: none  Consults: Hospitalist.  Follow-up: Clinic with Dr. Armstrong in 6 weeks with repeat x-rays.   Disposition: Pending progress with therapies, pain control on orals, and medical stability, anticipate discharge to home vs. TCU in 4-5 days    Patient discussed with Dr. Patti Paz, PGY4  [phone]

## 2019-04-24 NOTE — PLAN OF CARE
VS: Pt A&Ox 4. VSS. Incentive spirometer used while awake.   Output: Has an indwelling catheter with 850 ml output. Pt LBM 4/20/2019 and is passing gas with active BS   Activity: Pt on bedrest until 0800 on 4/24/2019.    Skin: WDL ex incision on back, incision on L abdominal side, and hemovac drainage from back incision.    Pain: Has pain in back is being controlled with oxycodone and tylenol. Ice applied.    CMS: CMS and Neuro's are intact. Patient denies numbness and tingling in all extremities. PCD's on BLE.    Dressing: Incisional dressing is CDI, incision on L abdominal side is MANUELA    Diet:  Is on regular diet. Denies nausea.    LDA:   PIV is patent in R arm and D5 & NS and 20meq KCL is infusing at 85ml/hr   Plan: Continue to monitor pt and anticipate discharge around 4/27/2019 to prior living arrangements with adequate pain management.

## 2019-04-25 ENCOUNTER — APPOINTMENT (OUTPATIENT)
Dept: GENERAL RADIOLOGY | Facility: CLINIC | Age: 64
DRG: 454 | End: 2019-04-25
Attending: STUDENT IN AN ORGANIZED HEALTH CARE EDUCATION/TRAINING PROGRAM
Payer: COMMERCIAL

## 2019-04-25 ENCOUNTER — APPOINTMENT (OUTPATIENT)
Dept: PHYSICAL THERAPY | Facility: CLINIC | Age: 64
DRG: 454 | End: 2019-04-25
Attending: ORTHOPAEDIC SURGERY
Payer: COMMERCIAL

## 2019-04-25 ENCOUNTER — APPOINTMENT (OUTPATIENT)
Dept: OCCUPATIONAL THERAPY | Facility: CLINIC | Age: 64
DRG: 454 | End: 2019-04-25
Attending: ORTHOPAEDIC SURGERY
Payer: COMMERCIAL

## 2019-04-25 PROCEDURE — 99232 SBSQ HOSP IP/OBS MODERATE 35: CPT | Performed by: INTERNAL MEDICINE

## 2019-04-25 PROCEDURE — 25000132 ZZH RX MED GY IP 250 OP 250 PS 637: Performed by: STUDENT IN AN ORGANIZED HEALTH CARE EDUCATION/TRAINING PROGRAM

## 2019-04-25 PROCEDURE — 97116 GAIT TRAINING THERAPY: CPT | Mod: GP | Performed by: PHYSICAL THERAPIST

## 2019-04-25 PROCEDURE — 72100 X-RAY EXAM L-S SPINE 2/3 VWS: CPT

## 2019-04-25 PROCEDURE — 97535 SELF CARE MNGMENT TRAINING: CPT | Mod: GO

## 2019-04-25 PROCEDURE — 97530 THERAPEUTIC ACTIVITIES: CPT | Mod: GP

## 2019-04-25 PROCEDURE — 97110 THERAPEUTIC EXERCISES: CPT | Mod: GP | Performed by: PHYSICAL THERAPIST

## 2019-04-25 PROCEDURE — 12000001 ZZH R&B MED SURG/OB UMMC

## 2019-04-25 PROCEDURE — 97110 THERAPEUTIC EXERCISES: CPT | Mod: GP

## 2019-04-25 RX ADMIN — OXYCODONE HYDROCHLORIDE 5 MG: 5 TABLET ORAL at 08:00

## 2019-04-25 RX ADMIN — RANITIDINE 150 MG: 150 TABLET ORAL at 19:48

## 2019-04-25 RX ADMIN — ACETAMINOPHEN 975 MG: 325 TABLET, FILM COATED ORAL at 04:09

## 2019-04-25 RX ADMIN — CYANOCOBALAMIN TAB 1000 MCG 1000 MCG: 1000 TAB at 16:58

## 2019-04-25 RX ADMIN — SENNOSIDES AND DOCUSATE SODIUM 2 TABLET: 8.6; 5 TABLET ORAL at 07:53

## 2019-04-25 RX ADMIN — ACETAMINOPHEN 975 MG: 325 TABLET, FILM COATED ORAL at 12:08

## 2019-04-25 RX ADMIN — OXYCODONE HYDROCHLORIDE 10 MG: 5 TABLET ORAL at 00:10

## 2019-04-25 RX ADMIN — POLYETHYLENE GLYCOL 3350 17 G: 17 POWDER, FOR SOLUTION ORAL at 12:13

## 2019-04-25 RX ADMIN — SENNOSIDES AND DOCUSATE SODIUM 2 TABLET: 8.6; 5 TABLET ORAL at 19:48

## 2019-04-25 RX ADMIN — PSYLLIUM HUSK 1 PACKET: 3.4 POWDER ORAL at 07:53

## 2019-04-25 RX ADMIN — OXYCODONE HYDROCHLORIDE 10 MG: 5 TABLET ORAL at 22:46

## 2019-04-25 RX ADMIN — GABAPENTIN 300 MG: 100 CAPSULE ORAL at 08:00

## 2019-04-25 RX ADMIN — RANITIDINE 150 MG: 150 TABLET ORAL at 07:53

## 2019-04-25 RX ADMIN — OXYCODONE HYDROCHLORIDE 5 MG: 5 TABLET ORAL at 15:03

## 2019-04-25 RX ADMIN — OXYCODONE HYDROCHLORIDE 5 MG: 5 TABLET ORAL at 18:43

## 2019-04-25 ASSESSMENT — ACTIVITIES OF DAILY LIVING (ADL)
ADLS_ACUITY_SCORE: 15
ADLS_ACUITY_SCORE: 16
ADLS_ACUITY_SCORE: 15
ADLS_ACUITY_SCORE: 17
ADLS_ACUITY_SCORE: 16
ADLS_ACUITY_SCORE: 16

## 2019-04-25 NOTE — PLAN OF CARE
Problem: Adult Inpatient Plan of Care  Goal: Plan of Care Review  Outcome: No Change           Note for the night shift.  D: Pt awake at the start of the shift. Wanted and received Pain pills. Walked to the bathroom with SBA and walker, at the start of the shift and at the end.  Voided 250 ml the first time and 225 ml the second time.  Sats good on NC. Slept well.  Neuros/CMS all intact. DId not need any more pain meds the rest of the night. Call light with in reach.Able to make needs known. A:  Doing OK.Sleeping.  P: Monitor closely.  Supportive cares. Medicate for pain as needed.Encourage activity today.

## 2019-04-25 NOTE — PROGRESS NOTES
[facility]    Medicine Progress Note - Hospitalist Service       Date of Admission:  4/22/2019  Assessment & Plan   Inge Son is a 64 year old s/p ALIF L5-S1, revision PISF L3-4 with extension to pelvis, SPO at L5-S1 with Dr. Armstrong. Complicated by intraoperative dural tear.  Patient admitted with 36 hrs of strict bed rest     #1  Lumbosacral spondylosis with radiculopathy, s/p and anterior Lumbar Interbody Fusion Lumbar 5-Sacral 1 Posterior Intrumented Spinal Fusion and Escobar Quiroz Osteotomy Lumbar 5-Sacral 1, and Right Pelvic Fixation with Dr. Armstrong and Dr. Bowling on 4/22  - Transition toOral pain medications.   --Nausea control with PRN zofran   --gabapentin 300mg BID  --tylenol PRN  --valium PRN   PT/OT as per protocol      #2 HTN    Asymptomatic    Restarted  amlodipine with hold parameters   hydrochlorothiazide to be resumed tomorrow or at discharge      #3 Hypokalemia  --Potassium replacement orderset placed   Has been stable post operatively at 4.3     #4 GERD  --TUMS PRN     #5 Ostenopenia   --Hold home alendronate           Diet: Advance Diet as Tolerated: Regular Diet Adult    DVT Prophylaxis: Pneumatic Compression Devices  Givens Catheter: not present  Code Status:     Disposition Plan   Expected discharge: 2 - 3 days, recommended to prior living arrangement once adequate pain management/ tolerating PO medications.  Entered: Huseyin Hernandez MD 04/25/2019, 10:54 AM       The patient's care was discussed with the Bedside Nurse, Care Coordinator/ and Patient.    Huseyin Hernandez MD  Hospitalist Service  [facility]    ______________________________________________________________________    Interval History   Inge continues to feel well   No new complaints  Eating and drinking well  No fevers or chills    Data reviewed today: I reviewed all medications, new labs and imaging results  over the last 24 hours. I personally reviewed no images or EKG's today.    Physical Exam   Vital Signs: Temp: 98.9  F (37.2  C) Temp src: Oral BP: 113/48   Heart Rate: 75 Resp: 16 SpO2: 96 % O2 Device: None (Room air) Oxygen Delivery: 2 LPM  Weight: 137 lbs 2.02 oz  General Appearance: Awake, alert. Not in distress   Respiratory: Clear breath sounds bilaterally  Cardiovascular: Normal heart sounds. No murmurs   GI:  Soft, non tender. Normal bowel sounds   Skin: No bruising or bleeding noted   Neuro:Alert and orientated X 3. No deficits     Data   Recent Labs   Lab 04/23/19  0720 04/22/19  2207 04/22/19  1623 04/22/19  1000   WBC 12.2*  --   --   --    HGB 10.2*  --  12.2  --    MCV 90  --   --   --    *  --   --   --      --  139  --    POTASSIUM 4.3 3.4 3.0* 4.8   CHLORIDE 106  --   --   --    CO2 30  --   --   --    BUN 12  --   --   --    CR 0.77  --   --   --    ANIONGAP 6  --   --   --    COMPA 8.1*  --   --   --    *  --  138* 101*

## 2019-04-25 NOTE — PLAN OF CARE
"      VS:   /49 (BP Location: Right arm)   Pulse 67   Temp 101.3  F (38.5  C) (Oral)   Resp 16   Ht 1.62 m (5' 3.78\")   Wt 62.2 kg (137 lb 2 oz)   SpO2 93%   BMI 23.70 kg/m    LS clear, pt triggered sepsis protocol MD notified   Output:   Voiding small amounts with good bladder scan pt not drinking a lot of fluids. BS+ last BM 4/22   Activity:   Log roll in bed and A-1 with walker to bed   Skin: Intact ex incision    Pain:   Tolerable with prn Oxycodone   Neuro/CMS:   Intact denies n/t pain in left leg improved which baseline per pt was bad   Dressing(s):   CDI   Diet:   reg   LDA:   Hemovac, PIV SL   Equipment:   Walker call light within reach   Plan:   Pain control, encourage pt to drink more fluids and IS per MD   Additional Info:         "

## 2019-04-25 NOTE — PROGRESS NOTES
"Orthopaedic Surgery Progress Note     Subjective: No acute events overnight. More pain but doesn't want to go up on pain medication. No headache. No bowel movement. Voiding well.     Objective: /48   Pulse 67   Temp 98.9  F (37.2  C)   Resp 16   Ht 1.62 m (5' 3.78\")   Wt 62.2 kg (137 lb 2 oz)   SpO2 96%   BMI 23.70 kg/m      General: NAD, alert and oriented, cooperative with exam.   Cardio: RRR, extremities wwp.   Respiratory: Non-labored breathing.  MSK: Dressing c/d/i. DP 2+. SILT L3-S1 bilaterally.   L2-3: Hip flexion L and R 5/5 strength    L4:  Knee extension L and R 5/5 strength   L5:  Foot / EHL dorsiflexion L and R 5/5 strength   S1:  Plantarflexion  L and R 5/5 strength    Drain: 5 ml last 24 hours    Labs:   Recent Labs   Lab 04/23/19  0720 04/22/19  1623   WBC 12.2*  --    HGB 10.2* 12.2   *  --      No results found for: SED    Assessment and Plan: Inge Son is a 64 year old s/p ALIF L5-S1, revision PISF L3-4 with extension to pelvis, SPO at L5-S1 with Dr. Armstrong. Complicated by intraoperative dural tear.     Ortho Primary  Activity: 36 hours flat bedrest  due to dural tear, complete. Now continue per usual protocol. However if headache, return to flatbed rest. Once able to tolerate: Up with assist until independent. No excessive bending or twisting. No lifting >10 lbs x 6 weeks. No Karla lift for transfers.   Weight bearing status: WBAT.  Pain management: Transition from IV to PO as tolerated. No NSAIDs   Antibiotics: Ancef x 24 hours.  Diet: Begin with clear fluids and progress diet as tolerated.   DVT prophylaxis: SCDs only. No chemical DVT ppx needed.  Imaging: XR Upright lumbosacral spine PTDC - ordered.  Labs: Hgb POD#1-3.  Bracing/Splinting: None.  Dressings: Keep Aquacel c/d/i x 7 days.  Drains: Document output per shift, will be discontinued at Orthopedic Surgery discretion. Discontinue today  Vera catheter: Remove vera today  Physical Therapy/Occupational Therapy: " Eval and treat.  Cultures: none  Consults: Hospitalist.  Follow-up: Clinic with Dr. Armstrong in 6 weeks with repeat x-rays.   Disposition: Pending progress with therapies, pain control on orals, and medical stability, anticipate discharge to home vs. TCU in 4-5 days    Patient discussed with Dr. Patti Paz, PGY4  [phone]

## 2019-04-25 NOTE — PLAN OF CARE
Discharge Planner OT   Patient plan for discharge: home, hoping for tomorrow  Current status: Supine>sit using log roll with SBA. Patient completed LE dressing using AE with SBA. Sitting EOB awaiting pain meds and PT.   Barriers to return to prior living situation: pain, post op precautions  Recommendations for discharge: home with assist  Rationale for recommendations: will likely need 1 more OT session, anticipate will meet goals tomorrow 4/27       Entered by: DAILY ENRIQUEZ HELD 04/25/2019 3:20 PM

## 2019-04-25 NOTE — PLAN OF CARE
Discharge Planner PT   Patient plan for discharge: Home with assist  Current status: Pt supine in bed upon arrival with  present. Pt agreeable to Physical therapy. Pt on room air throughout session as the O2 was removed this morning. Pt ambulated ~80' with RW and SBA. Pt ambulates with slow kisha but step through gait pattern, taking slow steps to turn walker. Pt states that she has more pain with turning around. Supine<>sit SBA with bed rail and HOB rasied. Sit<>stand SBA with use of walker. Pt completed supine exercises and 5x STS for strengthening. Pt given fresh ice at end of session.   Barriers to return to prior living situation: Medical needs  Recommendations for discharge: Home with walking program  Rationale for recommendations: Pt would benefit from continued skilled PT to improve strength, endurance, and ambulation distance and proper body mechanics education.        Entered by: Camila Lagos 04/25/2019 11:57 AM

## 2019-04-25 NOTE — PLAN OF CARE
A/O x 4. VSS. Tolerating reg diet. Pt up with SBA and walker to BR , voiding without difficulty. BS + x 4, passing flatus, no BM, supp offered, pt declined. Pain managed with tylenol, oxycodone. Drain removed without difficulty, dsgs CDI. Ambulated in halls with PT. Plan for possible discharge in am. Continue POC.

## 2019-04-26 ENCOUNTER — APPOINTMENT (OUTPATIENT)
Dept: OCCUPATIONAL THERAPY | Facility: CLINIC | Age: 64
DRG: 454 | End: 2019-04-26
Attending: ORTHOPAEDIC SURGERY
Payer: COMMERCIAL

## 2019-04-26 ENCOUNTER — APPOINTMENT (OUTPATIENT)
Dept: PHYSICAL THERAPY | Facility: CLINIC | Age: 64
DRG: 454 | End: 2019-04-26
Attending: ORTHOPAEDIC SURGERY
Payer: COMMERCIAL

## 2019-04-26 VITALS
DIASTOLIC BLOOD PRESSURE: 48 MMHG | HEART RATE: 67 BPM | HEIGHT: 64 IN | RESPIRATION RATE: 16 BRPM | SYSTOLIC BLOOD PRESSURE: 116 MMHG | OXYGEN SATURATION: 98 % | TEMPERATURE: 99.5 F | BODY MASS INDEX: 23.41 KG/M2 | WEIGHT: 137.13 LBS

## 2019-04-26 PROCEDURE — 25000132 ZZH RX MED GY IP 250 OP 250 PS 637: Performed by: STUDENT IN AN ORGANIZED HEALTH CARE EDUCATION/TRAINING PROGRAM

## 2019-04-26 PROCEDURE — 99231 SBSQ HOSP IP/OBS SF/LOW 25: CPT | Performed by: INTERNAL MEDICINE

## 2019-04-26 PROCEDURE — 97530 THERAPEUTIC ACTIVITIES: CPT | Mod: GP | Performed by: PHYSICAL THERAPIST

## 2019-04-26 PROCEDURE — 97530 THERAPEUTIC ACTIVITIES: CPT | Mod: GO

## 2019-04-26 PROCEDURE — 97116 GAIT TRAINING THERAPY: CPT | Mod: GP | Performed by: PHYSICAL THERAPIST

## 2019-04-26 PROCEDURE — 97535 SELF CARE MNGMENT TRAINING: CPT | Mod: GO

## 2019-04-26 RX ORDER — AMOXICILLIN 250 MG
1-2 CAPSULE ORAL 2 TIMES DAILY PRN
Qty: 100 TABLET | Refills: 0 | Status: SHIPPED | OUTPATIENT
Start: 2019-04-26

## 2019-04-26 RX ORDER — ACETAMINOPHEN 325 MG/1
650 TABLET ORAL EVERY 4 HOURS PRN
Qty: 100 TABLET | Refills: 0 | Status: SHIPPED | OUTPATIENT
Start: 2019-04-26

## 2019-04-26 RX ORDER — OXYCODONE HYDROCHLORIDE 5 MG/1
5-10 TABLET ORAL EVERY 4 HOURS PRN
Qty: 50 TABLET | Refills: 0 | Status: SHIPPED | OUTPATIENT
Start: 2019-04-26 | End: 2019-04-29

## 2019-04-26 RX ORDER — POLYETHYLENE GLYCOL 3350 17 G/17G
17 POWDER, FOR SOLUTION ORAL DAILY
Qty: 30 PACKET | Refills: 0 | Status: SHIPPED | OUTPATIENT
Start: 2019-04-26

## 2019-04-26 RX ADMIN — OXYCODONE HYDROCHLORIDE 5 MG: 5 TABLET ORAL at 10:20

## 2019-04-26 RX ADMIN — RANITIDINE 150 MG: 150 TABLET ORAL at 09:06

## 2019-04-26 RX ADMIN — OXYCODONE HYDROCHLORIDE 5 MG: 5 TABLET ORAL at 06:31

## 2019-04-26 RX ADMIN — OXYCODONE HYDROCHLORIDE 10 MG: 5 TABLET ORAL at 02:47

## 2019-04-26 RX ADMIN — OXYCODONE HYDROCHLORIDE 5 MG: 5 TABLET ORAL at 12:24

## 2019-04-26 RX ADMIN — POLYETHYLENE GLYCOL 3350 17 G: 17 POWDER, FOR SOLUTION ORAL at 09:05

## 2019-04-26 RX ADMIN — PSYLLIUM HUSK 1 PACKET: 3.4 POWDER ORAL at 09:05

## 2019-04-26 ASSESSMENT — ACTIVITIES OF DAILY LIVING (ADL)
ADLS_ACUITY_SCORE: 16

## 2019-04-26 NOTE — PLAN OF CARE
VS: Temp: 98.1  F (36.7  C) Temp src: Oral BP: 148/67   Heart Rate: 77 Resp: 16 SpO2: 96 % O2 Device: None (Room air)    O2: Stable room air. Denies SOB   Output: Voids spontaneously without difficulty    Last BM: 4/26. Pt stated she had large BM tonight   Activity: SBA with walker.    Skin: Intact ex incision   Pain: Mild pain, controlled well with oxycodone 10mg   CMS: Intact, no numbness or tingling    Dressing: CDI   Diet: Reg. No nausea    LDA: PIV SL   Equipment: Iv pole/pump, walker   Plan: Discharge today    Additional Info:

## 2019-04-26 NOTE — PLAN OF CARE
Discharge Planner OT   Patient plan for discharge: home today  Current status: Patient ambulated in room and hallway using FWW with supervision. Completed toilet task with supervision. Patient tolerated standing at sink for light self care tasks with SBA. Patient educated on shower transfer techniques and patient completed tub transfer with SBA.   Barriers to return to prior living situation: none anticipated  Recommendations for discharge: home with assist  Rationale for recommendations: patient has met OT goals and plans to discharge home today with assist from .        Entered by: DAILY CASTANO 04/26/2019 9:01 AM    Occupational Therapy Discharge Summary    Reason for therapy discharge:    All goals and outcomes met, no further needs identified.    Progress towards therapy goal(s). See goals on Care Plan in [facility] electronic health record for goal details.  Goals met    Therapy recommendation(s):    No further therapy is recommended.  Home with assist and use of DME prn.

## 2019-04-26 NOTE — PROGRESS NOTES
"Orthopaedic Surgery Progress Note     Subjective: No acute events overnight. Doing great. Pain well controlled- cutting back on meds. Looking forward to discharge today    Objective: /67 (BP Location: Left arm)   Pulse 67   Temp 98.1  F (36.7  C) (Oral)   Resp 16   Ht 1.62 m (5' 3.78\")   Wt 62.2 kg (137 lb 2 oz)   SpO2 96%   BMI 23.70 kg/m      General: NAD, alert and oriented, cooperative with exam.   Cardio: RRR, extremities wwp.   Respiratory: Non-labored breathing.  MSK: Dressing c/d/i. DP 2+. SILT L3-S1 bilaterally.   L2-3: Hip flexion L and R 5/5 strength    L4:  Knee extension L and R 5/5 strength   L5:  Foot / EHL dorsiflexion L and R 5/5 strength   S1:  Plantarflexion  L and R 5/5 strength    Drain: discontinued    Labs:   Recent Labs   Lab 04/23/19  0720 04/22/19  1623   WBC 12.2*  --    HGB 10.2* 12.2   *  --      No results found for: SED    Assessment and Plan: Inge Son is a 64 year old s/p ALIF L5-S1, revision PISF L3-4 with extension to pelvis, SPO at L5-S1 with Dr. Armstrong. Complicated by intraoperative dural tear.     Ortho Primary  Activity: 36 hours flat bedrest  due to dural tear, complete. Now continue per usual protocol. However if headache, return to flatbed rest. Once able to tolerate: Up with assist until independent. No excessive bending or twisting. No lifting >10 lbs x 6 weeks. No Karla lift for transfers.   Weight bearing status: WBAT.  Pain management: Transition from IV to PO as tolerated. No NSAIDs   Antibiotics: Ancef x 24 hours.  Diet: Begin with clear fluids and progress diet as tolerated.   DVT prophylaxis: SCDs only. No chemical DVT ppx needed.  Imaging: XR Upright lumbosacral spine PTDC - completed and reviewed with staff  Labs: Hgb POD#1-3.  Bracing/Splinting: None.  Dressings: Keep Aquacel c/d/i x 7 days.  Drains: Document output per shift, will be discontinued at Orthopedic Surgery discretion. Discontinue yesterday  Vera catheter: Remove vera " today  Physical Therapy/Occupational Therapy: Eval and treat.  Cultures: none  Consults: Hospitalist.  Follow-up: Clinic with Dr. Armstrong in 6 weeks with repeat x-rays.   Disposition: Pending progress with therapies, pain control on orals, and medical stability, anticipate discharge to home today    Patient discussed with Dr. Patti Paz, PGY4  [phone]

## 2019-04-26 NOTE — PLAN OF CARE
Discharge Planner PT   Patient plan for discharge: Home with spouse for assist  Current status: Pt demonstrated independent bed mobility using log rolling technique.  Pt demonstrated mod I with transfers with FWW and independent bed to chair transfers without assistive device.  Pt was able to ambulate short distances safely without FWW.  Pt ambulated 225' with FWW and mod I.  Pt ascended/descended 3 steps x 2 with 1 railing and SBA x 1, 3 steps with no railing and HHA.  Pt was educated on car transfer and verbalized understanding of car transfer.    Barriers to return to prior living situation: No barriers to discharge home.   Recommendations for discharge: Home with assist.   Rationale for recommendations: Pt will have assist from spouse at home       Entered by: Madyson Thorpe 04/26/2019 11:55 AM       Physical Therapy Discharge Summary    Reason for therapy discharge:    Discharged to home.    Progress towards therapy goal(s). See goals on Care Plan in [facility] electronic health record for goal details.  Goals partially met.  Barriers to achieving goals:   discharge from facility.    Therapy recommendation(s):    Continue home exercise program.

## 2019-04-26 NOTE — PLAN OF CARE
"Pt LS clear, /48 (BP Location: Right arm)   Pulse 67   Temp 99.5  F (37.5  C) (Oral)   Resp 16   Ht 1.62 m (5' 3.78\")   Wt 62.2 kg (137 lb 2 oz)   SpO2 98%   BMI 23.70 kg/m    BS+ large BM this AM, denies n/v. Denies n/t. Pain tolerable with prn oxycodone and ice packs. Cleared for discharge. Discharge instructions plus medications reviewed with and given to pt. Pt discharge home via car with   "

## 2019-04-26 NOTE — PROGRESS NOTES
[facility], [place]    Medicine Progress Note - Hospitalist Service       Date of Admission:  4/22/2019  Assessment & Plan   Inge Son is a 64 year old s/p ALIF L5-S1, revision PISF L3-4 with extension to pelvis, SPO at L5-S1 with Dr. Armstrong. Complicated by intraoperative dural tear.  Patient admitted with 36 hrs of strict bed rest     #1  Lumbosacral spondylosis with radiculopathy, s/p and anterior Lumbar Interbody Fusion Lumbar 5-Sacral 1 Posterior Intrumented Spinal Fusion and Escobar Quiroz Osteotomy Lumbar 5-Sacral 1, and Right Pelvic Fixation with Dr. Armstrong and Dr. Bowling on 4/22  -  Pain controlled with oral pain medications   --gabapentin 300mg BID  --tylenol PRN  --valium PRN   PT/OT as per protocol      #2 HTN   Asymptomatic   Restarted  amlodipine with hold parameters   Hydrochlorothiazide to be resumed at discharge      #3 Hypokalemia  --Potassium replacement orderset placed   Has been stable post operatively at 4.3     #4 GERD  --TUMS PRN     #5 Ostenopenia   --Hold home alendronate           Diet: Advance Diet as Tolerated: Regular Diet Adult  Diet    DVT Prophylaxis: Pneumatic Compression Devices  Givens Catheter: not present  Code Status: Full     Disposition Plan   Expected discharge: Medically stable to discharge today   Entered: Huseyin Hernandez MD 04/26/2019, 10:34 AM       The patient's care was discussed with the Bedside Nurse, Care Coordinator/ and Patient.    Huseyin Hernandez MD  Hospitalist Service  [facility]    ______________________________________________________________________    Interval History   Inge continues to feel well  No new complains'   looking forward to discharge home     Data reviewed today: I reviewed all medications, new labs and imaging results over the last 24 hours. I personally reviewed no images or EKG's today.    Physical Exam   Vital Signs: Temp: 99.5  F  (37.5  C) Temp src: Oral BP: 116/48   Heart Rate: 87 Resp: 16 SpO2: 98 % O2 Device: None (Room air)    Weight: 137 lbs 2.02 oz  General Appearance: Awake, alert. Not in distress   Respiratory: Clear breath sounds bilaterally  Cardiovascular: Normal heart sounds. No murmurs   GI:  Soft, non tender. Normal bowel sounds   Skin: No bruising or bleeding noted   Neuro:Alert and orientated X 3. No deficits     Data   Recent Labs   Lab 04/23/19  0720 04/22/19  2207 04/22/19  1623 04/22/19  1000   WBC 12.2*  --   --   --    HGB 10.2*  --  12.2  --    MCV 90  --   --   --    *  --   --   --      --  139  --    POTASSIUM 4.3 3.4 3.0* 4.8   CHLORIDE 106  --   --   --    CO2 30  --   --   --    BUN 12  --   --   --    CR 0.77  --   --   --    ANIONGAP 6  --   --   --    COMPA 8.1*  --   --   --    *  --  138* 101*

## 2019-04-27 ENCOUNTER — PATIENT OUTREACH (OUTPATIENT)
Dept: CARE COORDINATION | Facility: CLINIC | Age: 64
End: 2019-04-27

## 2019-04-29 ENCOUNTER — TELEPHONE (OUTPATIENT)
Dept: ORTHOPEDICS | Facility: CLINIC | Age: 64
End: 2019-04-29

## 2019-04-29 DIAGNOSIS — Z98.890 HISTORY OF LUMBOSACRAL SPINE SURGERY: ICD-10-CM

## 2019-04-29 RX ORDER — HYDROXYZINE HYDROCHLORIDE 25 MG/1
TABLET, FILM COATED ORAL
Qty: 60 TABLET | Refills: 1
Start: 2019-04-29 | End: 2019-10-24

## 2019-04-29 RX ORDER — OXYCODONE HYDROCHLORIDE 5 MG/1
5-10 TABLET ORAL EVERY 4 HOURS PRN
Qty: 90 TABLET | Refills: 0 | Status: SHIPPED | OUTPATIENT
Start: 2019-04-29 | End: 2019-05-09

## 2019-04-29 NOTE — DISCHARGE SUMMARY
"ORTHOPAEDIC DISCHARGE SUMMARY     Date of Admission: 4/22/2019  Date of Discharge: 4/26/2019 12:42 PM  Disposition: Home  Staff Physician: Dr. Mahendra Armstrong  Primary Care Provider: Era Tobias    DISCHARGE DIAGNOSIS:  Spondylosis, Radiculopathy, Kyphosis    PROCEDURES: Procedure(s):  Anterior Lumbar Interbody Fusion Lumbar 5-Sacral 1  Posterior Intrumented Spinal Fusion and Escobar Quiroz Osteotomy Lumbar 5-Sacral 1, and Right Pelvic Fixation on 4/22/2019    BRIEF HISTORY:  Nicely summarized by Dr. Armstrong on day of surgery:  \"64 year old female with chronic low back and right leg radicular pain; had previous L4-5 fusion in 2013 and R SIJ fusion 2017.  These surgeries helped with symptoms at the time.  Imaging revealed subjacent level spondylosis and stenosis L5-S1.  No significant sagittal malalignment.  Tried nonoperative treatment, continues to have significant disabling symptoms.  I thus offered surgery in form of L5-S1 fusion via anterior and posterior approach, possible pelvic fixation.  Consented after thorough discussion of the rationale, risks, benefits and alternatives.  Discussed bone graft options; consented to on-label use of Infuse BMP.\"    HOSPITAL COURSE:    Surgery was complicated by a dural tear which was successfully managed with 36 hours flat bed rest. She never had symptoms of headache or pain related to tear. Inge Son has done well post-operatively. Medicine was consulted post operatively to aid in management of medical comorbidities. See final recommendations below. The patient received routine nursing cares and is medically stable. Vital signs are stable. The patient is tolerating a regular diet without GI distress/nausea or vomiting. Voiding spontaneously. All PT/OT goals have been met for safe mobility. Pain is now controlled on oral medications which will be available on discharge. Stool softeners have been used while taking pain medications to help prevent constipation. " Inge Son is deemed medically safe to discharge.     Antibiotics:  ancef given periop and 24 hours postop.   DVT prophylaxis:  None indicated  PT Progress: Has met PT/OT goals for safe mobility.    Pain Meds:  Weaned off all IV pain meds by discharge.  Inpatient Events: No significant post operative events or complications. (Operative course complicated by dural tear as above)    Discharge orders and instructions as below.    FOLLOWUP:    Follow up with Dr. Armstrong at 6 weeks postoperatively.  Future Appointments   Date Time Provider Department Center   5/30/2019 11:30 AM Mahendra Armstrong MD [facility]   7/25/2019 11:30 AM Mahendra Armstrong MD [facility]       Appointments on [facility]. Call [phone] if you haven't heard regarding these appointments within 7 days of discharge.    PLANNED DISCHARGE ORDERS:           Discharge Medication List as of 4/26/2019 11:49 AM      START taking these medications    Details   order for DME Equipment being ordered: Walker Wheels () and Walker ()  Treatment Diagnosis: Gait instabilityDisp-1 each, R-0, Local Print      oxyCODONE (ROXICODONE) 5 MG tablet Take 1-2 tablets (5-10 mg) by mouth every 4 hours as needed for moderate to severe pain, Disp-50 tablet, R-0, Local Print      polyethylene glycol (MIRALAX/GLYCOLAX) packet Take 17 g by mouth daily, Disp-30 packet, R-0, E-Prescribe         CONTINUE these medications which have CHANGED    Details   acetaminophen (TYLENOL) 325 MG tablet Take 2 tablets (650 mg) by mouth every 4 hours as needed for pain, Disp-100 tablet, R-0, E-Prescribe      senna-docusate (SENOKOT-S/PERICOLACE) 8.6-50 MG tablet Take 1-2 tablets by mouth 2 times daily as needed for constipation, Disp-100 tablet, R-0, E-Prescribe         CONTINUE these medications which have NOT CHANGED    Details   albuterol (VENTOLIN HFA) 108 (90 Base) MCG/ACT inhaler Inhale 2 puffs into the lungs, Historical       alendronate (FOSAMAX) 70 MG tablet Take 70 mg by mouth once a week Sunday, Historical      amLODIPine (NORVASC) 5 MG tablet Take 5 mg by mouth every morning , Historical      Cholecalciferol (VITAMIN D PO) Take 1 tablet by mouth daily (with dinner), Historical      FIBER ADULT GUMMIES 2 g CHEW Take 2 tablets by mouth every morning, Historical      hydrochlorothiazide (HYDRODIURIL) 25 MG tablet Take 25 mg by mouth every morning , Historical      Multiple Vitamins-Minerals (CENTRUM SILVER ADULT 50+ PO) Take 1 tablet by mouth daily (with dinner) , Historical      Omega-3 Fatty Acids (FISH OIL PO) Take 1 tablet by mouth daily (with dinner), Historical      potassium chloride (KLOR-CON) 8 MEQ CR tablet Take 8 mEq by mouth daily (with dinner) , Historical      Probiotic Product (PROBIOTIC DAILY) CAPS Take 1 capsule by mouth daily (with dinner) , Historical      psyllium (METAMUCIL) 28.3 % packet Take 1 packet by mouth every morning, Historical      vitamin B-12 (CYANOCOBALAMIN) 1000 MCG tablet Take 1,000 mcg by mouth daily (with dinner) , Historical      zinc 50 MG TABS Take 50 mg by mouth daily (with dinner) , Historical      zolpidem (AMBIEN) 5 MG tablet Take 5 mg by mouth nightly as needed , Historical         STOP taking these medications       acetaminophen-codeine (TYLENOL #3) 300-30 MG per tablet Comments:   Reason for Stopping:         ibuprofen (ADVIL/MOTRIN) 200 MG tablet Comments:   Reason for Stopping:                 Discharge Procedure Orders   Reason for your hospital stay   Order Comments: You were admitted for spinal surgery     Follow Up and recommended labs and tests   Order Comments: Follow up with Dr. Armstrong in 6 weeks as scheduled     Activity   Order Comments: No excessive twisting or bending through spine  No lifting >10 lbs     Order Specific Question Answer Comments   Is discharge order? Yes      Discharge Instructions   Order Comments: Postoperative Instructions - Spinal fusion          Mahendra Armstrong  [address] [address]     You have had a spinal fusion. You have a dressing called Aquacel on your incision which can be worn for up to 7 days, and it can be worn in the shower. After the Aquacel has been removed, you do not need a dressing. There are steri strips directly over the incision. Those may stay in place until they fall off, which can take up to 2 weeks. It is okay to shower and wash gently with soap and water. Do not soak in the bath. No pools, hot tubs, or lakes for 6 weeks.     For postoperative pain control, I have prescribed a narcotic medication.  This should be taken for the first few weeks following surgery, but as soon as you are able, transition to an over-the-counter type medication such as Tylenol.  You may not take non-steroidal anti-inflammatory medications (eg: Advil, Ibuprofen, Aleve, others) for the first 3 months after surgery as it interferes with bone healing. While taking the narcotic medication, there are several precautions that you must adhere to. These medications have numerous side effects including nausea, constipation, and drowsiness. If you experience nausea, this may be relieved by taking the medication with food or a light meal. To avoid constipation, please use an over-the-counter stool softener or drink lots of water and eat fruits and vegetables. Avoid operating heavy machinery or driving an automobile while on narcotic medications.      You will be seen in the clinic at 6 weeks following surgery. You will not need to attend physical therapy during this time. You can focus on cardiovascular fitness by walking as much as you can tolerate. Avoid bending, lifting, and twisting. Your weight lifting restriction is 10 pounds until your first follow-up appointment.      When you get home, you may resume your normal diet as tolerated. You may not be very hungry but try to eat small healthy meals to help you  heal. Remember to drink plenty of water/fluids to help keep you hydrated.    After surgery, you may have a sensitive scar.  When the dressing has been removed, you may massage the scar to decrease sensitivity and help break down scar tissue. Do this up to 4 times per day.    Please call or return if you experience the following:  Fevers (temperature greater than 100.4 degrees Fahrenheit)  Pain that is getting worse or does not respond to pain medications  Drainage from your wound  Increasing redness about the wound  Any other worrisome symptoms    You may reach the clinic by dialing [phone].  After hours, you may reach the resident on call by dialing [phone].     Diet   Order Comments: regular     Order Specific Question Answer Comments   Is discharge order? Yes      Assign Questionnaire Series to Patient       Lidia Paz MD  PGY-4 Orthopaedic Surgery

## 2019-04-29 NOTE — TELEPHONE ENCOUNTER
[facility] Call Center    Phone Message    May a detailed message be left on voicemail: yes    Reason for Call: Medication Refill Request    Has the patient contacted the pharmacy for the refill? Yes   Name of medication being requested: oxyCODONE (ROXICODONE) 5 MG tablet OR something stronger  Provider who prescribed the medication: Dr Patti Ervin [facility]  Pharmacy: [facility] [id number] [place] [facility], [place] - [address] [place] [address]      Date medication is needed: ASAP  Pt is in Pain and needs something stronger. She is not sure is this working for her. Please send prescription directly to the pharmacy.         Action Taken: Message routed to:  [facility]opedics.    See phone messages. Was only given #50 Oxycodone on discharge.  I called pt back.  She rates postop pain 8 & states will run out of Oxycodone tomorrow Tues PM.    LIves  in [place] & she states does not have anyone who can  refill script for her &  is having his own medical issues right now also.  Taking Tylenol as ordered & using Ice.  She wondered about stronger med.  I explained we can not prescribe stronger med. But can add Atarax muscle relaxant that also makes pain med., work  Better & she agreed to try it.  Called to home pharmacy.  Continue Ice.  Call back prn. Has postop appts. Scheduled.  Pt agreed.  S.O./Amada Hill RN.

## 2019-04-29 NOTE — PROGRESS NOTES
Patient states that she is still experiencing a lot pain  She has been taking pain medication every 4 hours  And is requesting a refill   She would like it sent to her [facility] pharmacy     Please call patient in regards to post op pain.      See 2 phone messages. Was only given #50 Oxycodone on discharge.  I called pt back.  She rates postop pain 8 & states will run out of Oxycodone tomorrow Tues PM.    [facility]  in [place] & she states does not have anyone who can  refill script for her &  is having his own medical issues right now also.  Taking Tylenol as ordered & using Ice.  She wondered about stronger med.  I explained we can not prescribe stronger med. But can add Atarax muscle relaxant that also makes pain med., work  Better & she agreed to try it.  Called to home pharmacy.  Continue Ice.  Call back prn. Has postop appts. Scheduled.  Pt agreed.  S.O./Amada Hill RN.

## 2019-05-09 DIAGNOSIS — Z98.890 HISTORY OF LUMBOSACRAL SPINE SURGERY: ICD-10-CM

## 2019-05-09 RX ORDER — OXYCODONE HYDROCHLORIDE 5 MG/1
5-10 TABLET ORAL EVERY 4 HOURS PRN
Qty: 80 TABLET | Refills: 0 | Status: SHIPPED | OUTPATIENT
Start: 2019-05-09 | End: 2019-10-24

## 2019-05-21 DIAGNOSIS — M40.209 KYPHOSIS: ICD-10-CM

## 2019-05-21 DIAGNOSIS — M54.16 LUMBAR RADICULOPATHY: ICD-10-CM

## 2019-05-21 DIAGNOSIS — Z98.1 S/P LUMBAR SPINAL FUSION: Primary | ICD-10-CM

## 2019-05-21 DIAGNOSIS — M47.9 SPONDYLOSIS: ICD-10-CM

## 2019-05-21 DIAGNOSIS — M51.26 LUMBAR HERNIATED DISC: Primary | ICD-10-CM

## 2019-05-27 ASSESSMENT — ENCOUNTER SYMPTOMS
POLYPHAGIA: 0
SORE THROAT: 0
FLANK PAIN: 0
HEARTBURN: 0
MUSCLE CRAMPS: 0
POLYDIPSIA: 0
DYSURIA: 0
CHILLS: 1
EXERCISE INTOLERANCE: 0
ALTERED TEMPERATURE REGULATION: 1
BOWEL INCONTINENCE: 0
JAUNDICE: 0
NIGHT SWEATS: 1
CONSTIPATION: 1
VOMITING: 0
LIGHT-HEADEDNESS: 0
BLOOD IN STOOL: 0
LEG PAIN: 1
INCREASED ENERGY: 0
NECK MASS: 0
DECREASED APPETITE: 0
NECK PAIN: 0
DIARRHEA: 0
HEMATURIA: 0
STIFFNESS: 1
MUSCLE WEAKNESS: 1
SMELL DISTURBANCE: 0
FEVER: 0
SYNCOPE: 0
SINUS CONGESTION: 0
HALLUCINATIONS: 0
HOARSE VOICE: 1
PALPITATIONS: 0
JOINT SWELLING: 0
HYPOTENSION: 0
SINUS PAIN: 0
FATIGUE: 0
TASTE DISTURBANCE: 0
ORTHOPNEA: 0
SLEEP DISTURBANCES DUE TO BREATHING: 0
WEIGHT GAIN: 0
DIFFICULTY URINATING: 0
RECTAL PAIN: 0
WEIGHT LOSS: 0
BLOATING: 0
ABDOMINAL PAIN: 0
BACK PAIN: 1
NAUSEA: 0
HYPERTENSION: 0
ARTHRALGIAS: 1
MYALGIAS: 1
TROUBLE SWALLOWING: 0

## 2019-05-30 ENCOUNTER — OFFICE VISIT (OUTPATIENT)
Dept: ORTHOPEDICS | Facility: CLINIC | Age: 64
End: 2019-05-30
Payer: COMMERCIAL

## 2019-05-30 ENCOUNTER — ANCILLARY PROCEDURE (OUTPATIENT)
Dept: GENERAL RADIOLOGY | Facility: CLINIC | Age: 64
End: 2019-05-30
Attending: ORTHOPAEDIC SURGERY
Payer: COMMERCIAL

## 2019-05-30 DIAGNOSIS — M51.26 LUMBAR HERNIATED DISC: ICD-10-CM

## 2019-05-30 DIAGNOSIS — Z98.1 S/P SPINAL FUSION: Primary | ICD-10-CM

## 2019-05-30 NOTE — PROGRESS NOTES
Reason for Visit:  Chief Complaint   Patient presents with     Surgical Followup     Study pt. DOS 4/22/19 S/P Anterior Lumbar Interbody Fusion Lumbar 5-Sacral 1. Posterior Intrumented Spinal Fusion and Escobar Quiroz Osteotomy Lumbar 5-Sacral 1, and Right Pelvic Fixation       S>  64/f, 6 wks postop; 1st postop visit.    Unaccompanied.  Very happy with surgery; preop sxs resolved.  Only new symptom is some tingling/numbness dorsum of R foot and toes.  Not present on left.    Off opioids x past week.  Resumed driving.      Oswestry (CICI) Questionnaire    OSWESTRY DISABILITY INDEX 5/27/2019   Count 9   Sum 24   Oswestry Score (%) 53.33          Visual Analog Pain Scale  Back Pain Scale 0-10: 2.5  Right leg pain: 3  Left leg pain: 0    PROMIS-10 Scores  Global Mental Health Score: (P) 9  Global Physical Health Score: (P) 11  PROMIS TOTAL - SUBSCORES: (P) 20    O>   Alert, oriented x 3, cooperative.  Not in CP distress.  There were no vitals taken for this visit.  Surgical incision well-healed, no sign of infection.  Ambulates independently.   Grossly neurologically intact.    Imaging:   EOS full spine ap-lat shows stable posterior fixation L4-S1, with R pelvic fixation; R SIJ fusion (3 rods); L5-S1 OLIF; previous L4-5 TLIF.  No sign of fixation loosening/failure.  Sagittal measurements:  LL 38  L4-S1 20, ideal 21  PI 32  PI-LL mismatch -6  PT 5  SVA -0.3mm  TPA 2  GT 2  T10-L2 kyph 3    A>  - 6 wks postop, doing very well.    P>   Congratulated and reassured patient.  - Scheduled to start PT on Monday at [facility] in [place].    May advance to 20 lb lifting.  Advised common sense approach to activities.  RTC 6 wks (3 mos) with EOS lumbar ap-lat xr.        Mahendra Armstrong MD    Orthopaedic Spine Surgery  Dept Orthopaedic Surgery, [facility]  [phone] office, [phone] pager  [email or web address]

## 2019-05-30 NOTE — NURSING NOTE
Reason For Visit:   Chief Complaint   Patient presents with     Surgical Followup     Study pt. DOS 4/22/19 S/P Anterior Lumbar Interbody Fusion Lumbar 5-Sacral 1. Posterior Intrumented Spinal Fusion and Escobar Quiroz Osteotomy Lumbar 5-Sacral 1, and Right Pelvic Fixation     Primary MD: Era Tobias  Ref. MD: Era Tobias     ?  No     Occupation: Retired .  Currently working? No.  Work status?  Retired.     Date of injury: None  Type of injury: NA.     Date of surgery: 12/12/17  Type of surgery:   1.  Minimally invasive right SI joint fusion.   2.  Image-guided surgery.      Previous Surgery: 3/2013  lumbar fusion at [facility], Dr. Pennington     Smoker: No      Pain Assessment  Patient Currently in Pain: Yes  0-10 Pain Scale: 3  Primary Pain Location: Buttocks  Pain Descriptors: Discomfort    Oswestry (CICI) Questionnaire    OSWESTRY DISABILITY INDEX 5/27/2019   Count 9   Sum 24   Oswestry Score (%) 53.33        Visual Analog Pain Scale  Back Pain Scale 0-10: 2.5  Right leg pain: 3  Left leg pain: 0    Promis 10 Assessment    PROMIS 10 5/27/2019   In general, would you say your health is: Fair   In general, would you say your quality of life is: Fair   In general, how would you rate your physical health? Fair   In general, how would you rate your mental health, including your mood and your ability to think? Good   In general, how would you rate your satisfaction with your social activities and relationships? Poor   In general, please rate how well you carry out your usual social activities and roles Poor   To what extent are you able to carry out your everyday physical activities such as walking, climbing stairs, carrying groceries, or moving a chair? A little   How often have you been bothered by emotional problems such as feeling anxious, depressed or irritable? Sometimes   How would you rate your fatigue on average? Mild   How would you rate your pain on average?   0 = No Pain  to  10 =  Worst Imaginable Pain 6   Global Physical Health Score : Raw Score -   Global Mental Health Score : Raw Score -   Total (Physical + Mental Health Score) -   In general, would you say your health is: 2   In general, would you say your quality of life is: 2   In general, how would you rate your physical health? 2   In general, how would you rate your mental health, including your mood and your ability to think? 3   In general, how would you rate your satisfaction with your social activities and relationships? 1   In general, please rate how well you carry out your usual social activities and roles. (This includes activities at home, at work and in your community, and responsibilities as a parent, child, spouse, employee, friend, etc.) 1   To what extent are you able to carry out your everyday physical activities such as walking, climbing stairs, carrying groceries, or moving a chair? 2   In the past 7 days, how often have you been bothered by emotional problems such as feeling anxious, depressed, or irritable? 3   In the past 7 days, how would you rate your fatigue on average? 2   In the past 7 days, how would you rate your pain on average, where 0 means no pain, and 10 means worst imaginable pain? 6   Global Mental Health Score 9   Global Physical Health Score 11   PROMIS TOTAL - SUBSCORES 20   Some recent data might be hidden                Narda Hernandez LPN

## 2019-05-30 NOTE — LETTER
5/30/2019       RE: Inge Son  [address] [place] [address]     Dear Colleague,    Thank you for referring your patient, Inge Son, to the [facility] at [facility]. Please see a copy of my visit note below.    Reason for Visit:  Chief Complaint   Patient presents with     Surgical Followup     Study pt. DOS 4/22/19 S/P Anterior Lumbar Interbody Fusion Lumbar 5-Sacral 1. Posterior Intrumented Spinal Fusion and Escobar Quiroz Osteotomy Lumbar 5-Sacral 1, and Right Pelvic Fixation       S>  64/f, 6 wks postop; 1st postop visit.    Unaccompanied.  Very happy with surgery; preop sxs resolved.  Only new symptom is some tingling/numbness dorsum of R foot and toes.  Not present on left.    Off opioids x past week.  Resumed driving.      Oswestry (CICI) Questionnaire    OSWESTRY DISABILITY INDEX 5/27/2019   Count 9   Sum 24   Oswestry Score (%) 53.33          Visual Analog Pain Scale  Back Pain Scale 0-10: 2.5  Right leg pain: 3  Left leg pain: 0    PROMIS-10 Scores  Global Mental Health Score: (P) 9  Global Physical Health Score: (P) 11  PROMIS TOTAL - SUBSCORES: (P) 20    O>   Alert, oriented x 3, cooperative.  Not in CP distress.  There were no vitals taken for this visit.  Surgical incision well-healed, no sign of infection.  Ambulates independently.   Grossly neurologically intact.    Imaging:   EOS full spine ap-lat shows stable posterior fixation L4-S1, with R pelvic fixation; R SIJ fusion (3 rods); L5-S1 OLIF; previous L4-5 TLIF.  No sign of fixation loosening/failure.  Sagittal measurements:  LL 38  L4-S1 20, ideal 21  PI 32  PI-LL mismatch -6  PT 5  SVA -0.3mm  TPA 2  GT 2  T10-L2 kyph 3    A>  - 6 wks postop, doing very well.    P>   Congratulated and reassured patient.  - Scheduled to start PT on Monday at [facility] in [place].    May advance to 20 lb lifting.  Advised common sense approach to activities.  RTC 6 wks (3 mos) with EOS  lumbar ap-lat xr.        Mahendra Armstrong MD    Orthopaedic Spine Surgery  Dept Orthopaedic Surgery, [facility]  [phone] office, [phone] pager  [email or web address]

## 2019-07-16 ENCOUNTER — TRANSFERRED RECORDS (OUTPATIENT)
Dept: HEALTH INFORMATION MANAGEMENT | Facility: CLINIC | Age: 64
End: 2019-07-16

## 2019-07-22 ASSESSMENT — ENCOUNTER SYMPTOMS
STIFFNESS: 1
EYE PAIN: 0
MYALGIAS: 1
BACK PAIN: 1
EYE WATERING: 0
JOINT SWELLING: 0
ARTHRALGIAS: 1
EYE IRRITATION: 1
DOUBLE VISION: 0
NECK PAIN: 0
MUSCLE CRAMPS: 0
MUSCLE WEAKNESS: 0
EYE REDNESS: 0

## 2019-07-23 DIAGNOSIS — Z98.1 S/P LUMBAR SPINAL FUSION: Primary | ICD-10-CM

## 2019-07-25 ENCOUNTER — ANCILLARY PROCEDURE (OUTPATIENT)
Dept: GENERAL RADIOLOGY | Facility: CLINIC | Age: 64
End: 2019-07-25
Attending: ORTHOPAEDIC SURGERY
Payer: COMMERCIAL

## 2019-07-25 ENCOUNTER — OFFICE VISIT (OUTPATIENT)
Dept: ORTHOPEDICS | Facility: CLINIC | Age: 64
End: 2019-07-25
Payer: COMMERCIAL

## 2019-07-25 VITALS — WEIGHT: 132.8 LBS | BODY MASS INDEX: 22.67 KG/M2 | HEIGHT: 64 IN

## 2019-07-25 DIAGNOSIS — Z98.1 S/P SPINAL FUSION: Primary | ICD-10-CM

## 2019-07-25 DIAGNOSIS — Z98.1 S/P LUMBAR SPINAL FUSION: ICD-10-CM

## 2019-07-25 ASSESSMENT — MIFFLIN-ST. JEOR: SCORE: 1145.13

## 2019-07-25 NOTE — LETTER
7/25/2019       RE: Inge Son  [address] [place] [address]     Dear Colleague,    Thank you for referring your patient, Inge Son, to the [facility] at [facility]. Please see a copy of my visit note below.    Spine Surgical Hx:  3/12/2013 - Open TLIF L4-5 (Dr. Pennington, [facility]).  [Implants: Rosa Trio screws, AVS-PL PEEK cage].  Very pleased with results  12/12/2017 - Right MIS SIJ fusion (Dr. Will Neal). [Implants: SI-Bone iFuse].  04/22/2019 - Lateral ALIF-SPO L5-S1 with unilateral (R) pelvic fixation; use of Infuse BMP (Small kit) and allograft (Sembrano/Dash) for subjacent level spondylosis.  [Implants:  NuVasive Base titanium cage 15 deg; NuVasive Reline screw system, 5.5 mm titanium rods].    Reason for Visit:  3 month post-op    S>  64/f, 3 mos postop; last seen at 6 wks; doing very well. Back pain 75-80% improved from before surgery. No new numbness or tingling. She has been walking ~2 miles. Wondering if she can start golfing. She reports increased right hip pain, hx of bilateral hip OA. She is no longer taking pain medication.    Oswestry (CICI) Questionnaire    OSWESTRY DISABILITY INDEX 7/22/2019   Count 6   Sum 9   Oswestry Score (%) 30        PROMIS-10 Scores  Global Mental Health Score: (P) 10  Global Physical Health Score: (P) 13  PROMIS TOTAL - SUBSCORES: (P) 23    O>   Alert, oriented x 3, cooperative.  Not in CP distress.  There were no vitals taken for this visit.  Surgical incision well-healed, no sign of infection.  Ambulates independently.   5/5 motor strength in bilateral LEs.  Sensation grossly intact.    Imaging:   XR lumbar spine 2/3 views 7/25/2019: Instrumentation in place with no evidence of malalignment or loosening.     A>  - 3 months s/p lateral ALIF-SPO L5-S1 with unilateral (R) pelvic fixation; doing very well    P>   - She can return to golf  - may use ibuprofen prn for pain    RTC 3 mos (6 mos postop) with  EOS lumbar ap-lat.    Lilly Rodriguez MD  Orthopedic Surgery PGY1  Pager: [phone]    Attestation:  I (Dr. Mahendra Armstrong - Spine Surgeon) have personally evaluated patient with PGY-1 Michael, and agree with findings and plan outlined in the note, which I also edited.  I discussed at length with the patient/family, explained the nature of spinal condition, and formulated workup and/or treatment plan together.  All questions were answered to the best of my ability and to patient's apparent satisfaction.    Mahendra Armstrong MD    Orthopaedic Spine Surgery  Dept Orthopaedic Surgery, [facility]  [phone] office, [phone] pager  [email or web address]

## 2019-07-25 NOTE — PROGRESS NOTES
Spine Surgical Hx:  3/12/2013 - Open TLIF L4-5 (Dr. Pennington, [facility]).  [Implants: Conway Trio screws, AVS-PL PEEK cage].  Very pleased with results  12/12/2017 - Right MIS SIJ fusion (Dr. Will Neal). [Implants: SI-Bone iFuse].  04/22/2019 - Lateral ALIF-SPO L5-S1 with unilateral (R) pelvic fixation; use of Infuse BMP (Small kit) and allograft (Patti/Dash) for subjacent level spondylosis.  [Implants:  NuVasive Base titanium cage 15 deg; NuVasive Reline screw system, 5.5 mm titanium rods].      Reason for Visit:  3 month post-op    S>  64/f, 3 mos postop; last seen at 6 wks; doing very well. Back pain 75-80% improved from before surgery. No new numbness or tingling. She has been walking ~2 miles. Wondering if she can start golfing. She reports increased right hip pain, hx of bilateral hip OA. She is no longer taking pain medication.      Oswestry (CICI) Questionnaire    OSWESTRY DISABILITY INDEX 7/22/2019   Count 6   Sum 9   Oswestry Score (%) 30          PROMIS-10 Scores  Global Mental Health Score: (P) 10  Global Physical Health Score: (P) 13  PROMIS TOTAL - SUBSCORES: (P) 23    O>   Alert, oriented x 3, cooperative.  Not in CP distress.  There were no vitals taken for this visit.  Surgical incision well-healed, no sign of infection.  Ambulates independently.   5/5 motor strength in bilateral LEs.  Sensation grossly intact.    Imaging:   XR lumbar spine 2/3 views 7/25/2019: Instrumentation in place with no evidence of malalignment or loosening.     A>  - 3 months s/p lateral ALIF-SPO L5-S1 with unilateral (R) pelvic fixation; doing very well    P>   - She can return to golf  - may use ibuprofen prn for pain    RTC 3 mos (6 mos postop) with EOS lumbar ap-lat.    Lilly Rodriguez MD  Orthopedic Surgery PGY1  Pager: [phone]    Attestation:  I (Dr. Mahendra Armstrong - Spine Surgeon) have personally evaluated patient with PGY-1 Michael, and agree with findings and plan outlined in the note, which I also  edited.  I discussed at length with the patient/family, explained the nature of spinal condition, and formulated workup and/or treatment plan together.  All questions were answered to the best of my ability and to patient's apparent satisfaction.      Mahendra Armstrong MD    Orthopaedic Spine Surgery  Dept Orthopaedic Surgery, [facility]  [phone] office, [phone] pager  [email or web address]

## 2019-07-25 NOTE — NURSING NOTE
"Reason For Visit:   Chief Complaint   Patient presents with     Surgical Followup     Study patient. Anterior Lumbar Interbody Fusion Lumbar 5-Sacral 1. Posterior Intrumented Spinal Fusion and Escobar Quiroz Osteotomy Lumbar 5-Sacral 1, and Right Pelvic Fixation, DOS: 4-.        Primary MD: Era Tobias  Ref. MD: Era Tobias     ?  No     Occupation: Retired .  Currently working? No.  Work status?  Retired.     Date of injury: None  Type of injury: NA.     Date of surgery: 12/12/17  Type of surgery:   1.  Minimally invasive right SI joint fusion.   2.  Image-guided surgery.      Previous Surgery: 3/2013  lumbar fusion at [facility], Dr. Pennington     Smoker: No          Ht 1.63 m (5' 4.17\")   Wt 60.2 kg (132 lb 12.8 oz)   BMI 22.67 kg/m      Pain Assessment  Patient Currently in Pain: Yes  0-10 Pain Scale: 3  Primary Pain Location: Buttocks  Pain Descriptors: Discomfort    Oswestry (CICI) Questionnaire    OSWESTRY DISABILITY INDEX 7/22/2019   Count 6   Sum 9   Oswestry Score (%) 30      Visual Analog Pain Scale  Back Pain Scale 0-10: 0  Right leg pain: 3  Left leg pain: 0    Promis 10 Assessment    PROMIS 10 7/22/2019   In general, would you say your health is: Fair   In general, would you say your quality of life is: Fair   In general, how would you rate your physical health? Fair   In general, how would you rate your mental health, including your mood and your ability to think? Good   In general, how would you rate your satisfaction with your social activities and relationships? Fair   In general, please rate how well you carry out your usual social activities and roles Fair   To what extent are you able to carry out your everyday physical activities such as walking, climbing stairs, carrying groceries, or moving a chair? Moderately   How often have you been bothered by emotional problems such as feeling anxious, depressed or irritable? Sometimes   How would you rate your fatigue on " average? Mild   How would you rate your pain on average?   0 = No Pain  to  10 = Worst Imaginable Pain 3   Global Physical Health Score : Raw Score -   Global Mental Health Score : Raw Score -   Total (Physical + Mental Health Score) -   In general, would you say your health is: 2   In general, would you say your quality of life is: 2   In general, how would you rate your physical health? 2   In general, how would you rate your mental health, including your mood and your ability to think? 3   In general, how would you rate your satisfaction with your social activities and relationships? 2   In general, please rate how well you carry out your usual social activities and roles. (This includes activities at home, at work and in your community, and responsibilities as a parent, child, spouse, employee, friend, etc.) 2   To what extent are you able to carry out your everyday physical activities such as walking, climbing stairs, carrying groceries, or moving a chair? 3   In the past 7 days, how often have you been bothered by emotional problems such as feeling anxious, depressed, or irritable? 3   In the past 7 days, how would you rate your fatigue on average? 2   In the past 7 days, how would you rate your pain on average, where 0 means no pain, and 10 means worst imaginable pain? 3   Global Mental Health Score 10   Global Physical Health Score 13   PROMIS TOTAL - SUBSCORES 23   Some recent data might be hidden                Narda Hernandez LPN

## 2019-10-15 DIAGNOSIS — M51.26 LUMBAR HERNIATED DISC: Primary | ICD-10-CM

## 2019-10-15 DIAGNOSIS — Z98.890 HISTORY OF LUMBOSACRAL SPINE SURGERY: ICD-10-CM

## 2019-10-21 ASSESSMENT — ENCOUNTER SYMPTOMS
SKIN CHANGES: 0
BACK PAIN: 0
JOINT SWELLING: 1
POOR WOUND HEALING: 1
ARTHRALGIAS: 1
NAIL CHANGES: 0
NECK PAIN: 0
MYALGIAS: 0
MUSCLE WEAKNESS: 0
STIFFNESS: 1
MUSCLE CRAMPS: 0

## 2019-10-24 ENCOUNTER — OFFICE VISIT (OUTPATIENT)
Dept: ORTHOPEDICS | Facility: CLINIC | Age: 64
End: 2019-10-24
Payer: COMMERCIAL

## 2019-10-24 ENCOUNTER — ANCILLARY PROCEDURE (OUTPATIENT)
Dept: GENERAL RADIOLOGY | Facility: CLINIC | Age: 64
End: 2019-10-24
Attending: ORTHOPAEDIC SURGERY
Payer: COMMERCIAL

## 2019-10-24 DIAGNOSIS — Z98.1 S/P SPINAL FUSION: Primary | ICD-10-CM

## 2019-10-24 DIAGNOSIS — Z98.890 HISTORY OF LUMBOSACRAL SPINE SURGERY: ICD-10-CM

## 2019-10-24 DIAGNOSIS — M51.26 LUMBAR HERNIATED DISC: ICD-10-CM

## 2019-10-24 NOTE — LETTER
10/24/2019       RE: Inge Son  [address] [place] [address]     Dear Colleague,    Thank you for referring your patient, Inge Son, to the [facility] at [facility]. Please see a copy of my visit note below.    Spine Surgical Hx:  3/12/2013 - Open TLIF L4-5 (Dr. Pennington, [facility]).  [Implants: Rosa Trio screws, AVS-PL PEEK cage].  Very pleased with results  12/12/2017 - Right MIS SIJ fusion (Dr. Will Neal). [Implants: SI-Bone iFuse].  04/22/2019 - Lateral ALIF-SPO L5-S1 with unilateral (R) pelvic fixation; use of Infuse BMP (Small kit) and allograft (Sembrano/Dash) for subjacent level spondylosis.  [Implants:  [email or web address] Base titanium cage 15 deg; NuVasive Reline screw system, 5.5 mm titanium rods].    Reason for Visit:  Chief Complaint   Patient presents with     Surgical Followup     Study pt DOS 4/22/19 S/P Anterior Lumbar Interbody Fusion Lumbar 5-Sacral 1. Posterior Intrumented Spinal Fusion and Escobar Quiroz Osteotomy Lumbar 5-Sacral 1, and Right Pelvic Fixation       S>  64/f, 6 mos postop; last seen at 3 mos.  Doing well.     Unaccompanied.  Continues to do very well.  Preoperative symptoms much improved.  She spent most of the summer playing golf.  She is really very happy that she is able to go back to doing this.  Occasionally, she would still have some ache in her back;  However, certainly not to the degree that it was before surgery.    Off of opioids.    Oswestry (CICI) Questionnaire    OSWESTRY DISABILITY INDEX 10/21/2019   Count 10   Sum 11   Oswestry Score (%) 22          Visual Analog Pain Scale  Back Pain Scale 0-10: 0  Right leg pain: 4(More upper leg )  Left leg pain: 0    PROMIS-10 Scores  Global Mental Health Score: (P) 14  Global Physical Health Score: (P) 15  PROMIS TOTAL - SUBSCORES: (P) 29    O>   Alert, oriented x 3, cooperative.  Not in CP distress.  There were no vitals taken for this visit.  Surgical incision  well-healed, no sign of infection.  Ambulates independently.   Grossly neurologically intact both lower extremities.    Imaging:   EOS lumbar AP lateral standing x-rays taken today show stable interbody fusions L4-5 and L5-S1, posterior fixation L4 down to the pelvis on the right side and L4 to sacrum on the left side.  Also with stable sacroiliac joint fusion rods on the right side.  No evidence of screw or implant loosening or failure.  Maintained acceptable coronal and sagittal alignment.    A>   6 months postoperative, doing very well, with improved preoperative symptoms.    P>    Congratulated and reassured patient regarding my clinical and radiographic findings.  At this point, may continue with full activities as tolerated.  Advised to continue taking commonsense approach regarding activities.    RTC 6 mos (1 yr postop) with lumbar and pelvic CT for fusion status eval.    Questions answered.  TT > 10 mins, > 50% CC.    Mahendra Armstrong MD    Orthopaedic Spine Surgery  Dept Orthopaedic Surgery, [facility]  [phone] office, [phone] pager  [email or web address]

## 2019-10-24 NOTE — PROGRESS NOTES
Spine Surgical Hx:  3/12/2013 - Open TLIF L4-5 (Dr. Pennington, [facility]).  [Implants: [place] Trio screws, AVS-PL PEEK cage].  Very pleased with results  12/12/2017 - Right MIS SIJ fusion (Dr. Will Neal). [Implants: SI-Bone iFuse].  04/22/2019 - Lateral ALIF-SPO L5-S1 with unilateral (R) pelvic fixation; use of Infuse BMP (Small kit) and allograft (Sembrano/Dash) for subjacent level spondylosis.  [Implants:  NuVasive Base titanium cage 15 deg; [employer] Reline screw system, 5.5 mm titanium rods].      Reason for Visit:  Chief Complaint   Patient presents with     Surgical Followup     Study pt DOS 4/22/19 S/P Anterior Lumbar Interbody Fusion Lumbar 5-Sacral 1. Posterior Intrumented Spinal Fusion and Escobar Quiroz Osteotomy Lumbar 5-Sacral 1, and Right Pelvic Fixation       S>  64/f, 6 mos postop; last seen at 3 mos.  Doing well.     Unaccompanied.  Continues to do very well.  Preoperative symptoms much improved.  She spent most of the summer playing golf.  She is really very happy that she is able to go back to doing this.  Occasionally, she would still have some ache in her back;  However, certainly not to the degree that it was before surgery.    Off of opioids.      Oswestry (CICI) Questionnaire    OSWESTRY DISABILITY INDEX 10/21/2019   Count 10   Sum 11   Oswestry Score (%) 22          Visual Analog Pain Scale  Back Pain Scale 0-10: 0  Right leg pain: 4(More upper leg )  Left leg pain: 0    PROMIS-10 Scores  Global Mental Health Score: (P) 14  Global Physical Health Score: (P) 15  PROMIS TOTAL - SUBSCORES: (P) 29    O>   Alert, oriented x 3, cooperative.  Not in CP distress.  There were no vitals taken for this visit.  Surgical incision well-healed, no sign of infection.  Ambulates independently.   Grossly neurologically intact both lower extremities.    Imaging:   EOS lumbar AP lateral standing x-rays taken today show stable interbody fusions L4-5 and L5-S1, posterior fixation L4 down to the pelvis on the right side  and L4 to sacrum on the left side.  Also with stable sacroiliac joint fusion rods on the right side.  No evidence of screw or implant loosening or failure.  Maintained acceptable coronal and sagittal alignment.    A>   6 months postoperative, doing very well, with improved preoperative symptoms.    P>    Congratulated and reassured patient regarding my clinical and radiographic findings.  At this point, may continue with full activities as tolerated.  Advised to continue taking commonsense approach regarding activities.    RTC 6 mos (1 yr postop) with lumbar and pelvic CT for fusion status eval.    Questions answered.  TT > 10 mins, > 50% CC.      Mahendra Armstrong MD    Orthopaedic Spine Surgery  Dept Orthopaedic Surgery, [facility]  [phone] office, [phone] pager  [email or web address]

## 2019-10-24 NOTE — NURSING NOTE
Reason For Visit:   Chief Complaint   Patient presents with     Surgical Followup     Study pt DOS 4/22/19 S/P Anterior Lumbar Interbody Fusion Lumbar 5-Sacral 1. Posterior Intrumented Spinal Fusion and Escobar Quiroz Osteotomy Lumbar 5-Sacral 1, and Right Pelvic Fixation     Primary MD: Era Tobias  Ref. MD: Era Tobias     ?  No     Occupation: Retired .  Currently working? No.  Work status?  Retired.     Date of injury: None  Type of injury: NA.     Date of surgery: 12/12/17  Type of surgery:   1.  Minimally invasive right SI joint fusion.   2.  Image-guided surgery.      Previous Surgery: 3/2013  lumbar fusion at [facility], Dr. Pennington     Smoker: No    Pain Assessment  Patient Currently in Pain: Denies    Oswestry (CICI) Questionnaire    OSWESTRY DISABILITY INDEX 10/21/2019   Count 10   Sum 11   Oswestry Score (%) 22        Visual Analog Pain Scale  Back Pain Scale 0-10: 0  Right leg pain: 4(More upper leg )  Left leg pain: 0    Promis 10 Assessment    PROMIS 10 10/21/2019   In general, would you say your health is: Good   In general, would you say your quality of life is: Good   In general, how would you rate your physical health? Good   In general, how would you rate your mental health, including your mood and your ability to think? Very good   In general, how would you rate your satisfaction with your social activities and relationships? Very good   In general, please rate how well you carry out your usual social activities and roles Good   To what extent are you able to carry out your everyday physical activities such as walking, climbing stairs, carrying groceries, or moving a chair? Mostly   How often have you been bothered by emotional problems such as feeling anxious, depressed or irritable? Sometimes   How would you rate your fatigue on average? Mild   How would you rate your pain on average?   0 = No Pain  to  10 = Worst Imaginable Pain 3   Global Physical Health Score : Raw  Score -   Global Mental Health Score : Raw Score -   Total (Physical + Mental Health Score) -   In general, would you say your health is: 3   In general, would you say your quality of life is: 3   In general, how would you rate your physical health? 3   In general, how would you rate your mental health, including your mood and your ability to think? 4   In general, how would you rate your satisfaction with your social activities and relationships? 4   In general, please rate how well you carry out your usual social activities and roles. (This includes activities at home, at work and in your community, and responsibilities as a parent, child, spouse, employee, friend, etc.) 3   To what extent are you able to carry out your everyday physical activities such as walking, climbing stairs, carrying groceries, or moving a chair? 4   In the past 7 days, how often have you been bothered by emotional problems such as feeling anxious, depressed, or irritable? 3   In the past 7 days, how would you rate your fatigue on average? 2   In the past 7 days, how would you rate your pain on average, where 0 means no pain, and 10 means worst imaginable pain? 3   Global Mental Health Score 14   Global Physical Health Score 15   PROMIS TOTAL - SUBSCORES 29   Some recent data might be hidden                Narda Hernanedz LPN

## 2020-03-10 ENCOUNTER — HEALTH MAINTENANCE LETTER (OUTPATIENT)
Age: 65
End: 2020-03-10

## 2020-03-19 ENCOUNTER — DOCUMENTATION ONLY (OUTPATIENT)
Dept: CARE COORDINATION | Facility: CLINIC | Age: 65
End: 2020-03-19

## 2020-04-06 NOTE — PROGRESS NOTES
"Spine Surgical Hx:  03/12/2013 - Open TLIF L4-5 (Dr. Pennington, [facility]).  [Implants: Rosa Trio screws, AVS-PL PEEK cage].  Very pleased with results  12/12/2017 - Right MIS SIJ fusion (Dr. Will Neal). [Implants: SI-Bone iFuse].  04/22/2019 - Lateral ALIF-SPO L5-S1 with unilateral (R) pelvic fixation; use of Infuse BMP (Small kit) and allograft (Sembrano/Dash) for subjacent level spondylosis.  [Implants:  NuVasive Base titanium cage 15 deg; NuVasive Reline screw system, 5.5 mm titanium rods].      Inge Son is a 64 year old female who is being evaluated via a billable video visit.      The patient has been notified of following:   \"This video visit will be conducted via a call between you and your physician/provider. We have found that certain health care needs can be provided without the need for an in-person physical exam.  This service lets us provide the care you need with a video conversation.  If a prescription is necessary we can send it directly to your pharmacy.  If lab work is needed we can place an order for that and you can then stop by our lab to have the test done at a later time.  If during the course of the call the physician/provider feels a video visit is not appropriate, you will not be charged for this service.\"     Physician has received verbal consent for a Video Visit from the patient? Yes  Patient would like the video invitation sent by: Send to e-mail at: [email or web address]  Video Start Time: 9:25 AM    Reason for Visit:  Chief Complaint   Patient presents with     RECHECK     annual check up on back        S>  64/f 1 yr postop.  Last seen at 6 mos, doing very well.    Continues to do very well, even better compared to last visit.    Had some discomfort in her hip area, which she thinks is unrelated to the surgery; thinks it is related more to left hip DJD.  Continues to be highly satisfied with surgery.  Can walk ~ 1.5 mile (much more than before); beyond that, she gets some stiffness " in her buttock; but absolutely no discomfort in her spine.    Saw a non-surgeon in [place] (Dr. Sepulveda?) recently.  Told she has bursitis.  Referred to Dr. Srinivas Robertson ([facility]), who did not think it was bursitis, and that it was from piriformis instead (March 2020).  Was given piriformis injection; per patient, the shot helped.  Then went to [place] for 2 wks; was able to golf without pain.    Off pain meds.      Oswestry (CICI) Questionnaire    OSWESTRY DISABILITY INDEX 4/1/2020   Count 10   Sum 8   Oswestry Score (%) 16      Preop CICI 50%  6 wks  53.33%  3 mos  30%  6 mos  22%  1 yr  16%       PROMIS-10 Scores  Global Mental Health Score: (P) 12  Global Physical Health Score: (P) 14  PROMIS TOTAL - SUBSCORES: (P) 26    Imaging:  No new x-rays today.    Assessment:  - 2 yrs postop, doing very well  - Left hip DJD vs GT bursitis vs piriformis syndrome, with good response to piriformis injection (Dr. Robertson, March 2020).    Plan:  Congratulated and reassured patient  Continue full activities as tolerated.  Am happy that the shot Dr. Robertson gave her seems to have helped.  May continue following up with him, and get rpt shots in the future as needed.  RTC 2 yrs with lumbar and pelvis CT for fusion status eval.    Video-Visit Details  Type of service:  Video Visit  Video End Time (time video stopped): 9:31am  Originating Location (pt. Location): Home  Distant Location (provider location):  [facility]   Mode of Communication:  Video Conference via [employer]    Mahendra Armstrong MD    Orthopaedic Spine Surgery  Dept Orthopaedic Surgery, [facility]  [phone] office, [phone] pager  [email or web address]

## 2020-04-07 ENCOUNTER — VIRTUAL VISIT (OUTPATIENT)
Dept: ORTHOPEDICS | Facility: CLINIC | Age: 65
End: 2020-04-07
Payer: COMMERCIAL

## 2020-04-07 DIAGNOSIS — Z98.1 S/P SPINAL FUSION: Primary | ICD-10-CM

## 2020-04-07 RX ORDER — ROSUVASTATIN CALCIUM 10 MG/1
10 TABLET, COATED ORAL DAILY
COMMUNITY

## 2020-04-07 RX ORDER — ASPIRIN 81 MG/1
81 TABLET ORAL DAILY
COMMUNITY

## 2020-04-07 RX ORDER — PYRIDOXINE HCL (VITAMIN B6) 25 MG
25 TABLET ORAL DAILY
COMMUNITY
End: 2021-10-12

## 2020-04-07 NOTE — PROGRESS NOTES
[email or web address]     Reason For Visit:   Chief Complaint   Patient presents with     RECHECK     annual check up on back        Primary MD: Era Tobias      ?  No  Occupation Retired .  Currently working? No.  Work status?  Retired.    Date of injury: None    Date of surgery: 12/12/17  Type of surgery:   1.  Minimally invasive right SI joint fusion.   2.  Image-guided surgery.      Previous Surgery: 3/2013  lumbar fusion at [facility], Dr. Pennington    Smoker: No  Request smoking cessation information: No    There were no vitals taken for this visit.    Pain Assessment  Patient Currently in Pain: Denies    Oswestry (CICI) Questionnaire    OSWESTRY DISABILITY INDEX 4/1/2020   Count 10   Sum 8   Oswestry Score (%) 16         Promis 10 Assessment    PROMIS 10 4/1/2020   In general, would you say your health is: Good   In general, would you say your quality of life is: Good   In general, how would you rate your physical health? Fair   In general, how would you rate your mental health, including your mood and your ability to think? Good   In general, how would you rate your satisfaction with your social activities and relationships? Good   In general, please rate how well you carry out your usual social activities and roles Good   To what extent are you able to carry out your everyday physical activities such as walking, climbing stairs, carrying groceries, or moving a chair? Mostly   How often have you been bothered by emotional problems such as feeling anxious, depressed or irritable? Sometimes   How would you rate your fatigue on average? Mild   How would you rate your pain on average?   0 = No Pain  to  10 = Worst Imaginable Pain 2   Global Physical Health Score : Raw Score -   Global Mental Health Score : Raw Score -   Total (Physical + Mental Health Score) -   In general, would you say your health is: 3   In general, would you say your quality of life is: 3   In general, how would you rate your physical  health? 2   In general, how would you rate your mental health, including your mood and your ability to think? 3   In general, how would you rate your satisfaction with your social activities and relationships? 3   In general, please rate how well you carry out your usual social activities and roles. (This includes activities at home, at work and in your community, and responsibilities as a parent, child, spouse, employee, friend, etc.) 3   To what extent are you able to carry out your everyday physical activities such as walking, climbing stairs, carrying groceries, or moving a chair? 4   In the past 7 days, how often have you been bothered by emotional problems such as feeling anxious, depressed, or irritable? 3   In the past 7 days, how would you rate your fatigue on average? 2   In the past 7 days, how would you rate your pain on average, where 0 means no pain, and 10 means worst imaginable pain? 2   Global Mental Health Score 12   Global Physical Health Score 14   PROMIS TOTAL - SUBSCORES 26   Some recent data might be hidden        [place] ATC

## 2020-12-27 ENCOUNTER — HEALTH MAINTENANCE LETTER (OUTPATIENT)
Age: 65
End: 2020-12-27

## 2021-01-28 ENCOUNTER — TELEPHONE (OUTPATIENT)
Dept: ORTHOPEDICS | Facility: CLINIC | Age: 66
End: 2021-01-28

## 2021-01-28 DIAGNOSIS — Z98.1 S/P LUMBAR SPINAL FUSION: Primary | ICD-10-CM

## 2021-01-28 NOTE — TELEPHONE ENCOUNTER
[facility] Call Center    Phone Message    May a detailed message be left on voicemail: no     Reason for Call: Other: Discuss if CT or MRI is needed. Also seeing a Dr. Matias? in [place] for injections. He would like to see the patient's imaging. Also discuss getting in sooner the 03-+11     Action Taken: Message routed to:  [facility]    Travel Screening: Not Applicable    1-29-21:: STUDY PT. see phone message from yesterday afternoon.  See last dictations for plan.  I called pt back & told her I cant do sooner appt than what she already has 3-11-21 &  ordered 2 Cts which I scheduled before her appt so check in at 1300 & she agreed.she states she had 5 injections done of spine & Hip Bursa at [facility] Nov. 2019 & 3 in 2020 & yesterday 1-28-21.  I asked Benja our [email or web address] tech. To transfer all to our system so  can review at appt & she agreed.  She also wants to request our imaging be sent to outside Dr who does her injections  So I told her she needs to call med legal/medical records [phone] to sign TIFF & request that & she agreed.  Call back prn. pt agreed.  W.O./Amada Hill RN.

## 2021-02-10 ENCOUNTER — DOCUMENTATION ONLY (OUTPATIENT)
Dept: CARE COORDINATION | Facility: CLINIC | Age: 66
End: 2021-02-10

## 2021-02-10 ENCOUNTER — MYC MEDICAL ADVICE (OUTPATIENT)
Dept: CARE COORDINATION | Facility: CLINIC | Age: 66
End: 2021-02-10

## 2021-02-28 ENCOUNTER — MYC MEDICAL ADVICE (OUTPATIENT)
Dept: ORTHOPEDICS | Facility: CLINIC | Age: 66
End: 2021-02-28

## 2021-02-28 DIAGNOSIS — Z98.1 S/P SPINAL FUSION: Primary | ICD-10-CM

## 2021-02-28 DIAGNOSIS — M54.50 RIGHT LOW BACK PAIN: ICD-10-CM

## 2021-02-28 ASSESSMENT — ENCOUNTER SYMPTOMS
MYALGIAS: 1
ARTHRALGIAS: 1
NECK PAIN: 1
MUSCLE WEAKNESS: 0
BACK PAIN: 1
JOINT SWELLING: 0
STIFFNESS: 0
MUSCLE CRAMPS: 0

## 2021-03-01 NOTE — TELEPHONE ENCOUNTER
See My Chart message.  I reviewed with   symptoms & pts question about whether  she needs more imaging done before appt with  & he ordereed MRI Lumbar.  Ordered & scheduled.    I called pt who agreed with plan.  I asked pt to have imaging &  records sent to us from DR Robertson pain clinic all injections & she agreed.   Call back prn. Pt agreed.  [email or web address] Hill RN.

## 2021-03-11 ENCOUNTER — ANCILLARY PROCEDURE (OUTPATIENT)
Dept: CT IMAGING | Facility: CLINIC | Age: 66
End: 2021-03-11
Attending: ORTHOPAEDIC SURGERY
Payer: COMMERCIAL

## 2021-03-11 ENCOUNTER — ANCILLARY PROCEDURE (OUTPATIENT)
Dept: MRI IMAGING | Facility: CLINIC | Age: 66
End: 2021-03-11
Attending: ORTHOPAEDIC SURGERY
Payer: COMMERCIAL

## 2021-03-11 ENCOUNTER — OFFICE VISIT (OUTPATIENT)
Dept: ORTHOPEDICS | Facility: CLINIC | Age: 66
End: 2021-03-11
Payer: COMMERCIAL

## 2021-03-11 DIAGNOSIS — Z98.1 S/P SPINAL FUSION: ICD-10-CM

## 2021-03-11 DIAGNOSIS — M54.16 LUMBAR RADICULAR PAIN: ICD-10-CM

## 2021-03-11 DIAGNOSIS — Z98.1 S/P LUMBAR SPINAL FUSION: ICD-10-CM

## 2021-03-11 DIAGNOSIS — M48.062 LUMBAR STENOSIS WITH NEUROGENIC CLAUDICATION: Primary | ICD-10-CM

## 2021-03-11 DIAGNOSIS — M54.50 RIGHT LOW BACK PAIN: ICD-10-CM

## 2021-03-11 PROCEDURE — 99214 OFFICE O/P EST MOD 30 MIN: CPT | Performed by: ORTHOPAEDIC SURGERY

## 2021-03-11 PROCEDURE — 72148 MRI LUMBAR SPINE W/O DYE: CPT | Performed by: STUDENT IN AN ORGANIZED HEALTH CARE EDUCATION/TRAINING PROGRAM

## 2021-03-11 PROCEDURE — 72192 CT PELVIS W/O DYE: CPT | Performed by: RADIOLOGY

## 2021-03-11 PROCEDURE — 72131 CT LUMBAR SPINE W/O DYE: CPT | Performed by: STUDENT IN AN ORGANIZED HEALTH CARE EDUCATION/TRAINING PROGRAM

## 2021-03-11 NOTE — PROGRESS NOTES
"Spine Surgical Hx:  03/12/2013 - Open TLIF L4-5 (Dr. Pennington, [facility]).  [Implants: Rosa Trio screws, AVS-PL PEEK cage].  Very pleased with results  12/12/2017 - Right MIS SIJ fusion (Dr. Will Neal). [Implants: SI-Bone iFuse].  04/22/2019 - Lateral ALIF-SPO L5-S1 with unilateral (R) pelvic fixation; use of Infuse BMP (Small kit) and allograft (Sembrano/Dash) for subjacent level spondylosis.  [Implants:  NuVasive Base titanium cage 15 deg; NuVasive Reline screw system, 5.5 mm titanium rods].  10/26/2020 - Left RAMU (Dr. Cordoba, [facility]); complicated by dislocation requiring CR under sedation 11/7/20.        In-Person Visit    Reason for Visit:  Chief Complaint   Patient presents with     RECHECK     F/U 2CT's and MRI lumbar        S>  65/f, 2 yrs postop.  Last seen at 2 yr.  Doing very well.  Was having some L hip pain, presumed related to DJD.  Had piriformis injection c/o Dr. Robertson which helped.  P>  RTC at 3 yrs with lumbar and pelvis CT for fusion status eval.    Had been having R buttock pain radiating to posterolateral R hip and thigh.  Oct 26, 2020 - had L RAMU (c/o Dr. Timothy Cordoba) at [facility].  Twelve days later (11/7/20), the prosthesis dislocated.  Was brought back to hospital via ambulance.  Underwent Closed reduction under sedation.  Had to wear an abduction brace x 3 weeks.  Ultimately, the RAMU helped.    March 2020, July 2020, and Sept 2020 - had R piriformis injections c/o Dr. Robertson.  Per patient, helped but only for 1 week at a time.  When I asked how the pain relief was for that 1 week, patient replied \"pretty good\".  1/28/21 - L4-5 TFESI (c/o Dr. Robertson).  Per patient, this helped better, in terms of longer duration of relief (2 weeks).    Had continued to do exercises taught by PT.  Seeing Dr. Robertson again next week.  Previously, he had recommended acupuncture and aquatic therapy.  Per patient, she declined due to the pandemic lockdown.    Current meds (c/o Dr. Robertson):  Tylenol 1,000 " mg 3x/day (3 gm/day).  Ibuprofen 1,200 mg 2x/day (2.4 gm/day).      Oswestry (CICI) Questionnaire    OSWESTRY DISABILITY INDEX 2/28/2021   Count 8   Sum 19   Oswestry Score (%) 47.5      Preop CICI         50%  6 wks                 53.33%  3 mos                 30%  6 mos                 22%  1 yr                    16%  2 yrs   47.5%      Visual Analog Pain Scale  Back Pain Scale 0-10: 7  Right leg pain: 7  Left leg pain: 0    PROMIS-10 Scores  Global Mental Health Score: (P) 12  Global Physical Health Score: (P) 10  PROMIS TOTAL - SUBSCORES: (P) 22    O>   Alert, oriented x 3, cooperative.  Not in CP distress.  There were no vitals taken for this visit.  Surgical incision well-healed, no sign of infection.  Ambulates independently.   Grossly neurologically intact.    Imaging:   Lumbar and pelvis CT today: Shows solid arthrodesis L4 to sacrum; this includes the ALIF that was performed 2 years ago.  On the right side, the previously placed SI joint fusion rods are also very stable, no sign of loosening.  Thus, unlikely that she is still having SI joint pain.  The adjacent segment L3-4 is showing signs of spondylosis, mainly in form of facet arthropathy.  No significant disc space narrowing; no listhesis.    Lumbar MRI today: Shows postsurgical changes L4 to pelvis.  There is adjacent segment spondylosis with bilateral subarticular stenosis L3-4.  This is relatively mild, but potentially could be impinging the traversing L4 nerve roots.      A>   1.  Adjacent level spondylosis/facet arthropathy, with bilateral lateral recess stenosis L3-4, and right buttock and thigh radicular pain, likely mediated by the right L4 nerve root, with good response to right L4-5 TFESI (1/28/2021 care of Dr. Robertson).  2.  Solid arthrodesis L4 to sacrum and right sacroiliac joint.    P>  Had a good discussion with patient and  Ilan.  Given her CT findings, MRI findings, and positive response to right L4-5 TFESI, I believe most of  her pain is coming from the L3-4 level, with bilateral lateral recess stenosis, and advanced facet arthropathy.  However, there is no significant disc space narrowing, and no listhesis.  It just would be preferable to try to maximize nonoperative treatment prior to considering surgery.  If she would like to have surgery in the future, it may be reasonable to consider minimally invasive right hemilaminectomy L3-4.    In the meantime, continue treatment c/o Dr. Robertson.  Okay to consider repeat epidural injection, acupuncture, pool therapy, etc.  Ultimately, may consider surgery in form MIS Right hemilaminectomy L3-4.  RTC prn.      Mahendra Armstrong MD    Orthopaedic Spine Surgery  Dept Orthopaedic Surgery, [facility]  [phone] office, [phone] pager  [email or web address]

## 2021-06-19 ENCOUNTER — HEALTH MAINTENANCE LETTER (OUTPATIENT)
Age: 66
End: 2021-06-19

## 2021-09-09 ENCOUNTER — MYC MEDICAL ADVICE (OUTPATIENT)
Dept: ORTHOPEDICS | Facility: CLINIC | Age: 66
End: 2021-09-09

## 2021-10-09 ENCOUNTER — HEALTH MAINTENANCE LETTER (OUTPATIENT)
Age: 66
End: 2021-10-09

## 2021-10-11 ASSESSMENT — ENCOUNTER SYMPTOMS
ARTHRALGIAS: 1
ORTHOPNEA: 0
MUSCLE CRAMPS: 1
NECK PAIN: 0
PALPITATIONS: 0
EXERCISE INTOLERANCE: 1
HYPOTENSION: 1
LIGHT-HEADEDNESS: 0
SYNCOPE: 0
HYPERTENSION: 1
MUSCLE WEAKNESS: 1
LEG PAIN: 1
MYALGIAS: 1
STIFFNESS: 1
JOINT SWELLING: 0
BACK PAIN: 1
SLEEP DISTURBANCES DUE TO BREATHING: 0

## 2021-10-11 NOTE — PROGRESS NOTES
"Spine Surgical Hx:  03/12/2013 - Open TLIF L4-5 (Dr. Pennington, [facility]).  [Implants: Rosa Trio screws, AVS-PL PEEK cage].  Very pleased with results  12/12/2017 - Right MIS SIJ fusion (Dr. Will Neal). [Implants: SI-Bone iFuse].  04/22/2019 - Lateral ALIF-SPO L5-S1 with unilateral (R) pelvic fixation; use of Infuse BMP (Small kit) and allograft (Sembrano/Dash) for subjacent level spondylosis.  [Implants:  NuVasive Base titanium cage 15 deg; NuVasive Reline screw system, 5.5 mm titanium rods].  10/26/2020 - Left RAMU (Dr. Cordoba, [facility]); complicated by dislocation requiring CR under sedation 11/7/20.        VIRTUAL VISIT:  Patient is evaluated today via billable virtual visit.    The patient has been notified of following:   \"This virtual visit will be conducted via a call between you and your physician/provider. We have found that certain health care needs can be provided without the need for an in-person physical exam.  This service lets us provide the care you need with a virtual conversation.  If a prescription is necessary we can send it directly to your pharmacy.  If lab work is needed we can place an order for that and you can then stop by our lab to have the test done at a later time.  If during the course of the call the physician/provider feels a virtual visit is not appropriate, you will not be charged for this service.\"     Physician has received verbal consent for a Virtual Visit from the patient.  Platform used:  [employer] Video  Time:  2:04pm to 2:24pm  Originating Location (pt. Location): Home  Distant Location (provider location):  [facility] [place]     Chief Complaint   Patient presents with     RECHECK     Review MRI and injections from [facility]        Last Visit Date: 3/11/21  Previous Impression:  1.  Adjacent level spondylosis/facet arthropathy, with bilateral lateral recess stenosis L3-4, and right buttock and thigh radicular pain, likely mediated by the right L4 " nerve root, with good response to right L4-5 TFESI (1/28/2021 care of Dr. Robertson).  2.  Solid arthrodesis L4 to sacrum and right sacroiliac joint.  Previous Plan:  Given her CT findings, MRI findings, and positive response to right L4-5 TFESI, I believe most of her pain is coming from the L3-4 level, with bilateral lateral recess stenosis, and advanced facet arthropathy.  However, there is no significant disc space narrowing, and no listhesis.  It just would be preferable to try to maximize nonoperative treatment prior to considering surgery.  If she would like to have surgery in the future, it may be reasonable to consider minimally invasive right hemilaminectomy L3-4.  In the meantime, continue treatment c/o Dr. Robertson.  Okay to consider repeat epidural injection, acupuncture, pool therapy, etc.  Ultimately, may consider surgery in form MIS Right hemilaminectomy L3-4.  RTC prn.      S>  66 year old female, here for recheck.    Per patient, sxs have been progressively getting worse.  After last visit, followed up with Dr. Robertson.  Had L4-5 DEJA on 1/28/21.  Helped for 1.5 weeks.  Previous piriformis injections did not really help.    8 chiropractor visits.  Did acupuncture Jun-Aug 2021.  Did heat pads, TENS, tylenol, inversion table, exercises and stretches.    In Aug 2021, saw Dr. Sepulveda (nonop), who did some manipulation.  Ordered R hip MRI; done 8/18/21.  Saw Dr. Timothy Cordoba on 9/8/21.  Did not think her sxs are coming from her hip.  He thought they are coming from her spine, and suggested that she follow up with me.    Report: suspect high-grade cartilage loss in superomedial R hip joint (as read to me by patient).    Per patient, her pain is more in the R buttock, also radiates down her R leg, including ankle, top of foot, 1st and 2nd toes.  Worse:  Sitting.      Oswestry (CICI) Questionnaire    OSWESTRY DISABILITY INDEX 10/8/2021   Count 10   Sum 25   Oswestry Score (%) 50      Preop CICI         50%  6  wks                 53.33%  3 mos                 30%  6 mos                 22%  1 yr                    16%  2 yrs                   47.5%  2.5 yrs  50%    Visual Analog Pain Scale  Back Pain Scale 0-10: 6  Right leg pain: 6  Left leg pain: 0    PROMIS-10 Scores  Global Mental Health Score: (P) 9  Global Physical Health Score: (P) 11  PROMIS TOTAL - SUBSCORES: (P) 20    Physical Examination:    This was a virtual visit, so very limited exam could be performed.  Patient seemed alert, oriented x 3, cooperative, with coherent speech, and not in distress.  Able to respond to questions appropriately and follow instructions.    Imaging:    Right hip MRI from 8/18/2021 reviewed.  No report available.  Shows postsurgical changes of left hip and lumbosacral spine and right sacroiliac joint.  Per report read to me by the patient, there is suspicion for high-grade cartilage loss in the superior medial aspect of the right hip joint.    Assessment:    1.  Adjacent level spondylosis/facet arthropathy, with bilateral lateral recess stenosis L3-4, and right buttock and thigh radicular pain, likely mediated by the right L4 nerve root, with good response to right L4-5 TFESI (1/28/2021 care of Dr. Robertson).  2.  Solid arthrodesis L4 to sacrum and right sacroiliac joint.  3.  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     Plan:    Had good discussion with patient.  She continues to have worsening symptoms, and is quite unhappy with her level of symptoms at the moment.  Before we could recommend a treatment plan, we must first identify where her pain is mostly coming from.  Lumbar MRI shows some degree of adjacent level stenosis at L3-4.  A recent hip MRI shows arthritic changes in her right hip, including grade III chondromalacia.  She had recently seen hip surgeon (Dr. Cordoba) who did not think her pain was coming from her hip.    I recommend differential injections.  Last January, she had a  right L4-5 TFESI.  Per patient, this gave her 1.5 weeks of partial relief.  I thus now recommend a right hip intra-articular diagnostic (anesthetic only) injection, which may be performed either with ultrasound or fluoroscopy guidance.  Patient amenable.    - Retrieve report of R hip MRI done 8/18/21, and upload to PACS.  - R hip diagnostic injection (may be done ether ultrasound or fluoro-guided).  Patient prefers to have it done locally in [facility] ([employer] system).    Advised to give us a call 1-2 days later to report pain response.  I also asked her to compare her response to the hip injection to that from her R L4-5 TFESI done earlier this year c/o Dr. Robertson.    We may also set up virtual RTC to further discuss.    25 minutes spent on the date of the encounter doing chart review/review of outside records/review of test results/interpretation of tests/patient visit/documentation/discussion with other provider(s)/discussion with patient and family.        Mahendra Armstrong MD    Orthopaedic Spine Surgery  Dept Orthopaedic Surgery, [facility]  [phone] office, [phone] pager  [email or web address]

## 2021-10-12 ENCOUNTER — VIRTUAL VISIT (OUTPATIENT)
Dept: ORTHOPEDICS | Facility: CLINIC | Age: 66
End: 2021-10-12
Payer: COMMERCIAL

## 2021-10-12 DIAGNOSIS — M16.11 ARTHRITIS OF RIGHT HIP: Primary | ICD-10-CM

## 2021-10-12 PROCEDURE — 99214 OFFICE O/P EST MOD 30 MIN: CPT | Mod: 95 | Performed by: ORTHOPAEDIC SURGERY

## 2021-10-12 RX ORDER — LOSARTAN POTASSIUM 100 MG/1
100 TABLET ORAL
COMMUNITY
Start: 2021-06-17

## 2021-10-12 RX ORDER — ISOSORBIDE MONONITRATE 30 MG/1
30 TABLET, EXTENDED RELEASE ORAL
COMMUNITY
Start: 2021-06-17

## 2021-10-12 NOTE — LETTER
"    10/12/2021         RE: Inge Son  [address] [place] [address]        Dear Colleague,    Thank you for referring your patient, Inge Son, to the [facility]. Please see a copy of my visit note below.    Spine Surgical Hx:  03/12/2013 - Open TLIF L4-5 (Dr. Pennington, [facility]).  [Implants: Rosa Trio screws, AVS-PL PEEK cage].  Very pleased with results  12/12/2017 - Right MIS SIJ fusion (Dr. Will Neal). [Implants: SI-Bone iFuse].  04/22/2019 - Lateral ALIF-SPO L5-S1 with unilateral (R) pelvic fixation; use of Infuse BMP (Small kit) and allograft (Sembrano/Dash) for subjacent level spondylosis.  [Implants:  NuVasive Base titanium cage 15 deg; NuVasive Reline screw system, 5.5 mm titanium rods].  10/26/2020 - Left RAMU (Dr. Cordoba, [facility]); complicated by dislocation requiring CR under sedation 11/7/20.        VIRTUAL VISIT:  Patient is evaluated today via billable virtual visit.    The patient has been notified of following:   \"This virtual visit will be conducted via a call between you and your physician/provider. We have found that certain health care needs can be provided without the need for an in-person physical exam.  This service lets us provide the care you need with a virtual conversation.  If a prescription is necessary we can send it directly to your pharmacy.  If lab work is needed we can place an order for that and you can then stop by our lab to have the test done at a later time.  If during the course of the call the physician/provider feels a virtual visit is not appropriate, you will not be charged for this service.\"     Physician has received verbal consent for a Virtual Visit from the patient.  Platform used:  [employer] Video  Time:  2:04pm to 2:24pm  Originating Location (pt. Location): Home  Distant Location (provider location):  [facility]     Chief Complaint   Patient presents with     RECHECK "     Review MRI and injections from [facility]        Last Visit Date: 3/11/21  Previous Impression:  1.  Adjacent level spondylosis/facet arthropathy, with bilateral lateral recess stenosis L3-4, and right buttock and thigh radicular pain, likely mediated by the right L4 nerve root, with good response to right L4-5 TFESI (1/28/2021 care of Dr. Robertson).  2.  Solid arthrodesis L4 to sacrum and right sacroiliac joint.  Previous Plan:  Given her CT findings, MRI findings, and positive response to right L4-5 TFESI, I believe most of her pain is coming from the L3-4 level, with bilateral lateral recess stenosis, and advanced facet arthropathy.  However, there is no significant disc space narrowing, and no listhesis.  It just would be preferable to try to maximize nonoperative treatment prior to considering surgery.  If she would like to have surgery in the future, it may be reasonable to consider minimally invasive right hemilaminectomy L3-4.  In the meantime, continue treatment c/o Dr. Robertson.  Okay to consider repeat epidural injection, acupuncture, pool therapy, etc.  Ultimately, may consider surgery in form MIS Right hemilaminectomy L3-4.  RTC prn.      S>  66 year old female, here for recheck.    Per patient, sxs have been progressively getting worse.  After last visit, followed up with Dr. Robertson.  Had L4-5 DEJA on 1/28/21.  Helped for 1.5 weeks.  Previous piriformis injections did not really help.    8 chiropractor visits.  Did acupuncture Jun-Aug 2021.  Did heat pads, TENS, tylenol, inversion table, exercises and stretches.    In Aug 2021, saw Dr. Sepulveda (non), who did some manipulation.  Ordered R hip MRI; done 8/18/21.  Saw Dr. Timothy Cordoba on 9/8/21.  Did not think her sxs are coming from her hip.  He thought they are coming from her spine, and suggested that she follow up with me.    Report: suspect high-grade cartilage loss in superomedial R hip joint (as read to me by patient).    Per patient, her pain is more  in the R buttock, also radiates down her R leg, including ankle, top of foot, 1st and 2nd toes.  Worse:  Sitting.      Oswestry (CICI) Questionnaire    OSWESTRY DISABILITY INDEX 10/8/2021   Count 10   Sum 25   Oswestry Score (%) 50      Preop CICI         50%  6 wks                 53.33%  3 mos                 30%  6 mos                 22%  1 yr                    16%  2 yrs                   47.5%  2.5 yrs  50%    Visual Analog Pain Scale  Back Pain Scale 0-10: 6  Right leg pain: 6  Left leg pain: 0    PROMIS-10 Scores  Global Mental Health Score: (P) 9  Global Physical Health Score: (P) 11  PROMIS TOTAL - SUBSCORES: (P) 20    Physical Examination:    This was a virtual visit, so very limited exam could be performed.  Patient seemed alert, oriented x 3, cooperative, with coherent speech, and not in distress.  Able to respond to questions appropriately and follow instructions.    Imaging:    Right hip MRI from 8/18/2021 reviewed.  No report available.  Shows postsurgical changes of left hip and lumbosacral spine and right sacroiliac joint.  Per report read to me by the patient, there is suspicion for high-grade cartilage loss in the superior medial aspect of the right hip joint.    Assessment:    1.  Adjacent level spondylosis/facet arthropathy, with bilateral lateral recess stenosis L3-4, and right buttock and thigh radicular pain, likely mediated by the right L4 nerve root, with good response to right L4-5 TFESI (1/28/2021 care of Dr. Robertson).  2.  Solid arthrodesis L4 to sacrum and right sacroiliac joint.  3.  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     Plan:    Had good discussion with patient.  She continues to have worsening symptoms, and is quite unhappy with her level of symptoms at the moment.  Before we could recommend a treatment plan, we must first identify where her pain is mostly coming from.  Lumbar MRI shows some degree of adjacent level stenosis at L3-4.  A  recent hip MRI shows arthritic changes in her right hip, including grade III chondromalacia.  She had recently seen hip surgeon (Dr. Cordoba) who did not think her pain was coming from her hip.    I recommend differential injections.  Last January, she had a right L4-5 TFESI.  Per patient, this gave her 1.5 weeks of partial relief.  I thus now recommend a right hip intra-articular diagnostic (anesthetic only) injection, which may be performed either with ultrasound or fluoroscopy guidance.  Patient amenable.    - Retrieve report of R hip MRI done 8/18/21, and upload to PACS.  - R hip diagnostic injection (may be done ether ultrasound or fluoro-guided).  Patient prefers to have it done locally in [facility] ([employer] system).    Advised to give us a call 1-2 days later to report pain response.  I also asked her to compare her response to the hip injection to that from her R L4-5 TFESI done earlier this year c/o Dr. Robertson.    We may also set up virtual RTC to further discuss.    25 minutes spent on the date of the encounter doing chart review/review of outside records/review of test results/interpretation of tests/patient visit/documentation/discussion with other provider(s)/discussion with patient and family.        Mahendra Armstrong MD    Orthopaedic Spine Surgery  Dept Orthopaedic Surgery, [facility]  [phone] office, [phone] pager  [email or web address]

## 2021-11-02 ENCOUNTER — TRANSFERRED RECORDS (OUTPATIENT)
Dept: HEALTH INFORMATION MANAGEMENT | Facility: CLINIC | Age: 66
End: 2021-11-02
Payer: COMMERCIAL

## 2021-11-09 ENCOUNTER — VIRTUAL VISIT (OUTPATIENT)
Dept: ORTHOPEDICS | Facility: CLINIC | Age: 66
End: 2021-11-09
Payer: COMMERCIAL

## 2021-11-09 DIAGNOSIS — M48.062 LUMBAR STENOSIS WITH NEUROGENIC CLAUDICATION: Primary | ICD-10-CM

## 2021-11-09 DIAGNOSIS — M16.11 ARTHRITIS OF RIGHT HIP: ICD-10-CM

## 2021-11-09 DIAGNOSIS — Z98.1 S/P LUMBAR SPINAL FUSION: ICD-10-CM

## 2021-11-09 PROCEDURE — 99214 OFFICE O/P EST MOD 30 MIN: CPT | Mod: 95 | Performed by: ORTHOPAEDIC SURGERY

## 2021-11-09 NOTE — PROGRESS NOTES
"Spine Surgical Hx:  03/12/2013 - Open TLIF L4-5 (Dr. Pennington, [facility]).  [Implants: Rosa Trio screws, AVS-PL PEEK cage].  Very pleased with results  12/12/2017 - Right MIS SIJ fusion (Dr. Will Neal). [Implants: SI-Bone iFuse].  04/22/2019 - Lateral ALIF-SPO L5-S1 with unilateral (R) pelvic fixation; use of Infuse BMP (Small kit) and allograft (Sembrano/Dash) for subjacent level spondylosis.  [Implants:  NuVasive Base titanium cage 15 deg; NuVasive Reline screw system, 5.5 mm titanium rods].  10/26/2020 - Left RAMU (Dr. Cordoba, [facility]); complicated by dislocation requiring CR under sedation 11/7/20.        VIRTUAL VISIT:  Patient is evaluated today via billable virtual visit.    The patient has been notified of following:   \"This virtual visit will be conducted via a call between you and your physician/provider. We have found that certain health care needs can be provided without the need for an in-person physical exam.  This service lets us provide the care you need with a virtual conversation.  If a prescription is necessary we can send it directly to your pharmacy.  If lab work is needed we can place an order for that and you can then stop by our lab to have the test done at a later time.  If during the course of the call the physician/provider feels a virtual visit is not appropriate, you will not be charged for this service.\"     Physician has received verbal consent for a Virtual Visit from the patient.  Platform used:  [employer] Video  Time:  3:22pm to 3:44pm  Originating Location (pt. Location): Home  Distant Location (provider location):  [facility] ORTHOPED[facility]     Chief Complaint   Patient presents with     RECHECK     F/U injections        Last Visit Date: 10/12/21  Previous Impression:  1.  Adjacent level spondylosis/facet arthropathy, with bilateral lateral recess stenosis L3-4, and right buttock and thigh radicular pain, likely mediated by the right L4 nerve root, with good " response to right L4-5 TFESI (1/28/2021 care of Dr. Robertson).  2.  Solid arthrodesis L4 to sacrum and right sacroiliac joint.  3.  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  Previous Plan:  I recommend differential injections.  Last January, she had a right L4-5 TFESI.  Per patient, this gave her 1.5 weeks of partial relief.  I thus now recommend a right hip intra-articular diagnostic (anesthetic only) injection, which may be performed either with ultrasound or fluoroscopy guidance.   - Retrieve report of R hip MRI done 8/18/21, and upload to PACS.  - R hip diagnostic injection (may be done ether ultrasound or fluoro-guided).  Patient prefers to have it done locally in [facility] ([facility] system).  Advised to give us a call 1-2 days later to report pain response.  I also asked her to compare her response to the hip injection to that from her R L4-5 TFESI done earlier this year c/o Dr. Robertson.  We may also set up virtual RTC to further discuss.      S>  66 year old female, here to review injection response, and discuss further options.    10/21/21 - R hip injection (c/o [facility]).  Per patient, no relief.  11/4/21 - R L4-5 TFESI (c/o Dr. Neil Frank, [facility]).  Per report, 30% relief.  Per patient, initially not much relief.  It took a couple of days to take effect, but she's been feeling somewhat better since.    Per patient, the L4-5 TFESI helped more.  However, even then, it only relieved the foot and lower leg tingling, but not her most bothersome symptom, which is pain on her posterior thigh and lateral hip.    She had been working with Dr. Srinivas Robertson on this issue as well for a while now.  He had performed her previous right L4-5 TFESI in January 2020.  Per patient, Dr. Robertson has recommended aquatic therapy, but wanted her to come down to [facility] to do it.  According to patient, this is quite a trek for her, as she lives in  [place].      Oswestry (CICI) Questionnaire    OSWESTRY DISABILITY INDEX 11/9/2021   Count 9   Sum 21   Oswestry Score (%) 46.67      Preop CICI         50%  6 wks                 53.33%  3 mos                 30%  6 mos                 22%  1 yr                    16%  2 yrs                   47.5%  2.5 yrs                50%    Visual Analog Pain Scale  Back Pain Scale 0-10: 6  Right leg pain: 0  Left leg pain: 0    PROMIS-10 Scores  Global Mental Health Score: (P) 9  Global Physical Health Score: (P) 10  PROMIS TOTAL - SUBSCORES: (P) 19    Physical Examination:    This was a virtual visit, so very limited exam could be performed.  Patient seemed alert, oriented x 3, cooperative, with coherent speech, and not in distress.  Able to respond to questions appropriately and follow instructions.    Imaging:    Reviewed images from recent right L4-5 TFESI 11/4/2021.    We do not have images from her right hip injection performed 10/21/2021.    No new x-rays obtained today.    Assessment:    1.  Adjacent level spondylosis/facet arthropathy, with bilateral lateral recess stenosis L3-4, and right buttock and thigh radicular pain, likely mediated by the right L4 nerve root, with good response to R L4-5 TFESI (1/28/2021 c/o Dr. Robertson), and modest response to subsequent R L4-5 TFESI (11/4/21 c/o Dr. Neil Frank [facility]).  2.  Solid arthrodesis L4 to sacrum and right sacroiliac joint.  3.  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    Plan:    - Retrieve R hip injection images and report from [facility] 10/21/21.    Had a good long discussion with the patient.  Unfortunately, despite undergoing differential injections, we have not fully identified her exact pain generator.  While the right L4-5 TFESI gave her better relief compared to the right hip intra-articular injection, even this was not  convincing.  For one thing, report said only 30% relief.  Second, she said it did not really give her any initial relief, and with the relief she got only came 2 days later.  Lastly, even then, it only helped with the tingling in her feet, and not with her main complaint which was the posterior thigh pain.  As such, I do not think that going after an L4 nerve root decompression surgery would be of significant benefit.    I told her that I do not think further surgery at this point would be the answer to her ongoing symptoms.  I encouraged her to pursue aquatic therapy as recommended by Dr. Robertson.  Since she lives in [facility], I do not think that this necessarily has to be done in [place], and perhaps could be done locally.    We also discussed the option of spinal cord stimulator.  I did not implant these devices myself, and would certainly defer to the pain specialists regarding further work-up and whether she would be a good candidate for it.  I had a brief discussion with her regarding the general mechanism how they work.  Patient already seems to be knowledgeable about this.  She would consider this option if symptoms persist.    Return to clinic as needed.  25 minutes spent on the date of the encounter doing chart review/review of outside records/review of test results/interpretation of tests/patient visit/documentation/discussion with other provider(s)/discussion with patient and family.    Mahendra Armstrong MD    Orthopaedic Spine Surgery  Dept Orthopaedic Surgery, [facility]  [phone] office, [phone] pager  [email or web address]

## 2021-11-09 NOTE — LETTER
"    11/9/2021         RE: Inge Son  [address] [place] [address]        Dear Colleague,    Thank you for referring your patient, Inge Son, to the [facility]. Please see a copy of my visit note below.    Spine Surgical Hx:  03/12/2013 - Open TLIF L4-5 (Dr. Pennington, [facility]).  [Implants: [place] Trio screws, AVS-PL PEEK cage].  Very pleased with results  12/12/2017 - Right MIS SIJ fusion (Dr. Will Neal). [Implants: SI-Bone iFuse].  04/22/2019 - Lateral ALIF-SPO L5-S1 with unilateral (R) pelvic fixation; use of Infuse BMP (Small kit) and allograft (Sembrano/Dash) for subjacent level spondylosis.  [Implants:  NuVasive Base titanium cage 15 deg; NuVasive Reline screw system, 5.5 mm titanium rods].  10/26/2020 - Left RAMU (Dr. Cordoba, [facility]); complicated by dislocation requiring CR under sedation 11/7/20.        VIRTUAL VISIT:  Patient is evaluated today via billable virtual visit.    The patient has been notified of following:   \"This virtual visit will be conducted via a call between you and your physician/provider. We have found that certain health care needs can be provided without the need for an in-person physical exam.  This service lets us provide the care you need with a virtual conversation.  If a prescription is necessary we can send it directly to your pharmacy.  If lab work is needed we can place an order for that and you can then stop by our lab to have the test done at a later time.  If during the course of the call the physician/provider feels a virtual visit is not appropriate, you will not be charged for this service.\"     Physician has received verbal consent for a Virtual Visit from the patient.  Platform used:  [employer] Video  Time:  3:22pm to 3:44pm  Originating Location (pt. Location): Home  Distant Location (provider location):  [facility]THOP[facility]     Chief Complaint   Patient presents with     RECHECK "     F/U injections        Last Visit Date: 10/12/21  Previous Impression:  1.  Adjacent level spondylosis/facet arthropathy, with bilateral lateral recess stenosis L3-4, and right buttock and thigh radicular pain, likely mediated by the right L4 nerve root, with good response to right L4-5 TFESI (1/28/2021 care of Dr. Robertson).  2.  Solid arthrodesis L4 to sacrum and right sacroiliac joint.  3.  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  Previous Plan:  I recommend differential injections.  Last January, she had a right L4-5 TFESI.  Per patient, this gave her 1.5 weeks of partial relief.  I thus now recommend a right hip intra-articular diagnostic (anesthetic only) injection, which may be performed either with ultrasound or fluoroscopy guidance.   - Retrieve report of R hip MRI done 8/18/21, and upload to PACS.  - R hip diagnostic injection (may be done ether ultrasound or fluoro-guided).  Patient prefers to have it done locally in [facility] ([email or web address]).  Advised to give us a call 1-2 days later to report pain response.  I also asked her to compare her response to the hip injection to that from her R L4-5 TFESI done earlier this year c/o Dr. Robertson.  We may also set up virtual RTC to further discuss.      S>  66 year old female, here to review injection response, and discuss further options.    10/21/21 - R hip injection (c/o [facility]).  Per patient, no relief.  11/4/21 - R L4-5 TFESI (c/o Dr. Neil Frank, [facility]).  Per report, 30% relief.  Per patient, initially not much relief.  It took a couple of days to take effect, but she's been feeling somewhat better since.    Per patient, the L4-5 TFESI helped more.  However, even then, it only relieved the foot and lower leg tingling, but not her most bothersome symptom, which is pain on her posterior thigh and lateral hip.    She had been working with Dr. Srinivas Robertson on this issue as well for a while now.  He  had performed her previous right L4-5 TFESI in January 2020.  Per patient, Dr. Robertson has recommended aquatic therapy, but wanted her to come down to [facility] to do it.  According to patient, this is quite a trek for her, as she lives in [place].      Oswestry (CICI) Questionnaire    OSWESTRY DISABILITY INDEX 11/9/2021   Count 9   Sum 21   Oswestry Score (%) 46.67      Preop CICI         50%  6 wks                 53.33%  3 mos                 30%  6 mos                 22%  1 yr                    16%  2 yrs                   47.5%  2.5 yrs                50%    Visual Analog Pain Scale  Back Pain Scale 0-10: 6  Right leg pain: 0  Left leg pain: 0    PROMIS-10 Scores  Global Mental Health Score: (P) 9  Global Physical Health Score: (P) 10  PROMIS TOTAL - SUBSCORES: (P) 19    Physical Examination:    This was a virtual visit, so very limited exam could be performed.  Patient seemed alert, oriented x 3, cooperative, with coherent speech, and not in distress.  Able to respond to questions appropriately and follow instructions.    Imaging:    Reviewed images from recent right L4-5 TFESI 11/4/2021.    We do not have images from her right hip injection performed 10/21/2021.    No new x-rays obtained today.    Assessment:    1.  Adjacent level spondylosis/facet arthropathy, with bilateral lateral recess stenosis L3-4, and right buttock and thigh radicular pain, likely mediated by the right L4 nerve root, with good response to R L4-5 TFESI (1/28/2021 c/o Dr. Robertson), and modest response to subsequent R L4-5 TFESI (11/4/21 c/o Dr. Neil Frank, [facility]).  2.  Solid arthrodesis L4 to sacrum and right sacroiliac joint.  3.  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    Plan:    - Retrieve R hip injection images and report from [facility] 10/21/21.    Had a good long discussion  with the patient.  Unfortunately, despite undergoing differential injections, we have not fully identified her exact pain generator.  While the right L4-5 TFESI gave her better relief compared to the right hip intra-articular injection, even this was not convincing.  For one thing, report said only 30% relief.  Second, she said it did not really give her any initial relief, and with the relief she got only came 2 days later.  Lastly, even then, it only helped with the tingling in her feet, and not with her main complaint which was the posterior thigh pain.  As such, I do not think that going after an L4 nerve root decompression surgery would be of significant benefit.    I told her that I do not think further surgery at this point would be the answer to her ongoing symptoms.  I encouraged her to pursue aquatic therapy as recommended by Dr. Robertson.  Since she lives in [facility], I do not think that this necessarily has to be done in [place], and perhaps could be done locally.    We also discussed the option of spinal cord stimulator.  I did not implant these devices myself, and would certainly defer to the pain specialists regarding further work-up and whether she would be a good candidate for it.  I had a brief discussion with her regarding the general mechanism how they work.  Patient already seems to be knowledgeable about this.  She would consider this option if symptoms persist.    Return to clinic as needed.  25 minutes spent on the date of the encounter doing chart review/review of outside records/review of test results/interpretation of tests/patient visit/documentation/discussion with other provider(s)/discussion with patient and family.    Mahendra Armstrong MD    Orthopaedic Spine Surgery  Dept Orthopaedic Surgery, [facility]  [phone] office, [phone] pager  [email or web address]

## 2022-07-10 ENCOUNTER — HEALTH MAINTENANCE LETTER (OUTPATIENT)
Age: 67
End: 2022-07-10

## 2022-09-11 ENCOUNTER — HEALTH MAINTENANCE LETTER (OUTPATIENT)
Age: 67
End: 2022-09-11

## 2023-01-23 ENCOUNTER — HEALTH MAINTENANCE LETTER (OUTPATIENT)
Age: 68
End: 2023-01-23

## 2023-07-29 ENCOUNTER — HEALTH MAINTENANCE LETTER (OUTPATIENT)
Age: 68
End: 2023-07-29

## 2024-05-06 NOTE — LETTER
"    3/11/2021         RE: Inge Son  [address] [place] [address]        Dear Colleague,    Thank you for referring your patient, Inge Son, to the [facility] [place]. Please see a copy of my visit note below.    Spine Surgical Hx:  03/12/2013 - Open TLIF L4-5 (Dr. Pennington, [facility]).  [Implants: Rosa Trio screws, AVS-PL PEEK cage].  Very pleased with results  12/12/2017 - Right MIS SIJ fusion (Dr. Will Neal). [Implants: SI-Bone iFuse].  04/22/2019 - Lateral ALIF-SPO L5-S1 with unilateral (R) pelvic fixation; use of Infuse BMP (Small kit) and allograft (Sembrano/Dash) for subjacent level spondylosis.  [Implants:  NuVasive Base titanium cage 15 deg; NuVasive Reline screw system, 5.5 mm titanium rods].  10/26/2020 - Left RAMU (Dr. Cordoba, [facility]); complicated by dislocation requiring CR under sedation 11/7/20.        In-Person Visit    Reason for Visit:  Chief Complaint   Patient presents with     RECHECK     F/U 2CT's and MRI lumbar        S>  65/f, 2 yrs postop.  Last seen at 2 yr.  Doing very well.  Was having some L hip pain, presumed related to DJD.  Had piriformis injection c/o Dr. Robertson which helped.  P>  RTC at 3 yrs with lumbar and pelvis CT for fusion status eval.    Had been having R buttock pain radiating to posterolateral R hip and thigh.  Oct 26, 2020 - had L RAMU (c/o Dr. Timothy Cordoba) at [facility].  Twelve days later (11/7/20), the prosthesis dislocated.  Was brought back to hospital via ambulance.  Underwent Closed reduction under sedation.  Had to wear an abduction brace x 3 weeks.  Ultimately, the RAMU helped.    March 2020, July 2020, and Sept 2020 - had R piriformis injections c/o Dr. Robertson.  Per patient, helped but only for 1 week at a time.  When I asked how the pain relief was for that 1 week, patient replied \"pretty good\".  1/28/21 - L4-5 TFESI (c/o Dr. Robertson).  Per patient, this helped better, in terms of longer duration of " relief (2 weeks).    Had continued to do exercises taught by PT.  Seeing Dr. Robertson again next week.  Previously, he had recommended acupuncture and aquatic therapy.  Per patient, she declined due to the pandemic lockdown.    Current meds (c/o Dr. Robertson):  Tylenol 1,000 mg 3x/day (3 gm/day).  Ibuprofen 1,200 mg 2x/day (2.4 gm/day).      Oswestry (CICI) Questionnaire    OSWESTRY DISABILITY INDEX 2/28/2021   Count 8   Sum 19   Oswestry Score (%) 47.5      Preop CICI         50%  6 wks                 53.33%  3 mos                 30%  6 mos                 22%  1 yr                    16%  2 yrs   47.5%      Visual Analog Pain Scale  Back Pain Scale 0-10: 7  Right leg pain: 7  Left leg pain: 0    PROMIS-10 Scores  Global Mental Health Score: (P) 12  Global Physical Health Score: (P) 10  PROMIS TOTAL - SUBSCORES: (P) 22    O>   Alert, oriented x 3, cooperative.  Not in CP distress.  There were no vitals taken for this visit.  Surgical incision well-healed, no sign of infection.  Ambulates independently.   Grossly neurologically intact.    Imaging:   Lumbar and pelvis CT today: Shows solid arthrodesis L4 to sacrum; this includes the ALIF that was performed 2 years ago.  On the right side, the previously placed SI joint fusion rods are also very stable, no sign of loosening.  Thus, unlikely that she is still having SI joint pain.  The adjacent segment L3-4 is showing signs of spondylosis, mainly in form of facet arthropathy.  No significant disc space narrowing; no listhesis.    Lumbar MRI today: Shows postsurgical changes L4 to pelvis.  There is adjacent segment spondylosis with bilateral subarticular stenosis L3-4.  This is relatively mild, but potentially could be impinging the traversing L4 nerve roots.      A>   1.  Adjacent level spondylosis/facet arthropathy, with bilateral lateral recess stenosis L3-4, and right buttock and thigh radicular pain, likely mediated by the right L4 nerve root, with good response to  right L4-5 TFESI (1/28/2021 care of Dr. Robertson).  2.  Solid arthrodesis L4 to sacrum and right sacroiliac joint.    P>  Had a good discussion with patient and  Ilan.  Given her CT findings, MRI findings, and positive response to right L4-5 TFESI, I believe most of her pain is coming from the L3-4 level, with bilateral lateral recess stenosis, and advanced facet arthropathy.  However, there is no significant disc space narrowing, and no listhesis.  It just would be preferable to try to maximize nonoperative treatment prior to considering surgery.  If she would like to have surgery in the future, it may be reasonable to consider minimally invasive right hemilaminectomy L3-4.    In the meantime, continue treatment c/o Dr. Robertson.  Okay to consider repeat epidural injection, acupuncture, pool therapy, etc.  Ultimately, may consider surgery in form MIS Right hemilaminectomy L3-4.  RTC prn.      Mahendra Armstrong MD    Orthopaedic Spine Surgery  Dept Orthopaedic Surgery, [facility]  [phone] office, [phone] pager  [email or web address]       4 = No assist / stand by assistance

## 2024-06-27 NOTE — PROGRESS NOTES
"[facility]: Post-Discharge Note  SITUATION                                                      Admission:    Admission Date: 04/22/19   Reason for Admission: Spondylosis, Radiculopathy, Kyphosis  Discharge:   Discharge Date: 04/26/19  Discharge Diagnosis: Spondylosis, Radiculopathy, Kyphosis    BACKGROUND                                                      Nicely summarized by Dr. Armstrong on day of surgery:  \"64 year old female with chronic low back and right leg radicular pain; had previous L4-5 fusion in 2013 and R SIJ fusion 2017.  These surgeries helped with symptoms at the time.  Imaging revealed subjacent level spondylosis and stenosis L5-S1.  No significant sagittal malalignment.  Tried nonoperative treatment, continues to have significant disabling symptoms.  I thus offered surgery in form of L5-S1 fusion via anterior and posterior approach, possible pelvic fixation.  Consented after thorough discussion of the rationale, risks, benefits and alternatives.  Discussed bone graft options; consented to on-label use of Infuse BMP.\"           ASSESSMENT      Discharge Assessment  Patient reports symptoms are: Unchanged(Having pain that still needing to take pain medications every 4 hours for)  Does the patient have all of their medications?: Yes  Does patient know what their new medications are for?: Yes  Does patient have a follow-up appointment scheduled?: Yes  Does patient have any other questions or concerns?: Yes(requesting refill on pain medications)    Post-op  Did the patient have surgery or a procedure: Yes  Drainage: No  Bleeding: none  Fever: No  Chills: No  Redness: No  Warmth: No  Swelling: No  Incision site pain: Yes        PLAN                                                          Future Appointments   Date Time Provider Department Center   5/30/2019 11:30 AM Mahendra Armstrong MD [facility]   7/25/2019 11:30 AM Mahendra Armstrong MD [facility] " Pt called telling MIRZA that she is not improving since her visit. MIRZA sent the call over, Dorcas verified her information then hung up. Writer tried to call back, Dorcas answered and yelled \"I'm not talking to a isaac about this. You guys know that...Bye\"

## 2024-09-21 ENCOUNTER — HEALTH MAINTENANCE LETTER (OUTPATIENT)
Age: 69
End: 2024-09-21

## (undated) DEVICE — GLOVE PROTEXIS W/NEU-THERA 7.0  2D73TE70

## (undated) DEVICE — DRILL BIT QUICK COUPLING CAN 5.0X300MM 310.63

## (undated) DEVICE — NDL SPINAL 18GA 3.5" 405184

## (undated) DEVICE — SOL WATER IRRIG 1000ML BOTTLE 2F7114

## (undated) DEVICE — SU SILK 2-0 TIE 12X30" A305H

## (undated) DEVICE — Device

## (undated) DEVICE — IMPLANTABLE DEVICE
Type: IMPLANTABLE DEVICE | Site: SPINE LUMBAR | Status: NON-FUNCTIONAL
Removed: 2019-04-22

## (undated) DEVICE — TUBING SUCTION MEDI-VAC 1/4"X20' N620A

## (undated) DEVICE — DRSG PRIMAPORE 03 1/8X6" 66000318

## (undated) DEVICE — DRAIN HEMOVAC RESERVOIR KIT 10FR 1/8" MED 00-2550-002-10

## (undated) DEVICE — DRAPE STERI TOWEL LG 1010

## (undated) DEVICE — MOUSE O ARM 9732721

## (undated) DEVICE — SYR 30ML LL W/O NDL 302832

## (undated) DEVICE — LINEN BACK PACK 5440

## (undated) DEVICE — SU VICRYL 1 CT-1 CR 8X18" J741D

## (undated) DEVICE — BASIN SET MAJOR

## (undated) DEVICE — ESU CORD BIPOLAR GREEN 10-4000

## (undated) DEVICE — GLOVE PROTEXIS MICRO 6.5  2D73PM65

## (undated) DEVICE — GLOVE PROTEXIS BLUE W/NEU-THERA 6.5  2D73EB65

## (undated) DEVICE — SU VICRYL 0 CT-2 27" J334H

## (undated) DEVICE — SU VICRYL 2-0 CT-1 27" UND J259H

## (undated) DEVICE — PREP DURAPREP 26ML APL 8630

## (undated) DEVICE — LINEN TOWEL PACK X30 5481

## (undated) DEVICE — LINEN GOWN X4 5410

## (undated) DEVICE — SU DERMABOND ADVANCED .7ML DNX12

## (undated) DEVICE — BLADE BONE MILL STRK 5.0MM MED 5400-701-000

## (undated) DEVICE — GLOVE PROTEXIS BLUE W/NEU-THERA 8.5  2D73EB85

## (undated) DEVICE — SUCTION MANIFOLD DORNOCH ULTRA CART UL-CL500

## (undated) DEVICE — STPL SKIN 35W 059037

## (undated) DEVICE — DRAPE IOBAN INCISE 36X23" 6651EZ

## (undated) DEVICE — ENDO DISSECTOR BLUNT 05MM  BTD05

## (undated) DEVICE — ESU GROUND PAD UNIVERSAL W/O CORD

## (undated) DEVICE — SU PROLENE 5-0 RB-1DA 36"  8556H

## (undated) DEVICE — CLIP APPLIER 11" MED LIGACLIP MCM30

## (undated) DEVICE — MIDAS REX DISSECTING TOOL  14MH30

## (undated) DEVICE — DRSG DRAIN 4X4" 7086

## (undated) DEVICE — DRAPE POUCH INSTRUMENT 1018

## (undated) DEVICE — SU MONOCRYL 4-0 PS-2 18" UND Y496G

## (undated) DEVICE — WIRE GUIDE 2.8X300MM NON-THRD W/FLUTES 292.81

## (undated) DEVICE — SU SILK 2-0 SH 30" K833H

## (undated) DEVICE — DRAPE O ARM TUBE 9732722

## (undated) DEVICE — GLOVE PROTEXIS W/NEU-THERA 8.0  2D73TE80

## (undated) DEVICE — WIRE GUIDE SYN 2.8X450MM THRD 900.726

## (undated) DEVICE — SU VICRYL 3-0 SH 27" UND J416H

## (undated) DEVICE — SPONGE SURGIFOAM 100 1974

## (undated) DEVICE — SU PDS II 0 CTX 60" Z990G

## (undated) DEVICE — SYR 03ML SLIP TIP W/O NDL LATEX FREE 309656

## (undated) DEVICE — SPONGE LAP 18X18" X8435

## (undated) DEVICE — PACK UNIVERSAL SPLIT 29131

## (undated) DEVICE — SOL NACL 0.9% IRRIG 1000ML BOTTLE 2F7124

## (undated) DEVICE — ESU ELEC BLADE 6" COATED/INSULATED E1455-6

## (undated) DEVICE — GLOVE PROTEXIS MICRO 8.0  2D73PM80

## (undated) DEVICE — CATH TRAY FOLEY SURESTEP 16FR WDRAIN BAG STLK LATEX A300316A

## (undated) DEVICE — LINEN TOWEL PACK X5 5464

## (undated) DEVICE — DRAPE LAP W/ARMBOARD 29410

## (undated) DEVICE — SPONGE COTTONOID 3/4X3/4" 80-1401

## (undated) DEVICE — SUCTION TIP YANKAUER STR K87

## (undated) DEVICE — DRAPE POUCH IRR 1016

## (undated) DEVICE — PREP CHLORAPREP 26ML TINTED ORANGE  260815

## (undated) DEVICE — MARKER SPHERZ PACK 5

## (undated) DEVICE — ESU ELEC BLADE HEX-LOCKING 2.5" E1450X

## (undated) DEVICE — GOWN IMPERVIOUS SPECIALTY XLG/XLONG 32474

## (undated) DEVICE — SU VICRYL 0 CT-1 27" UND J260H

## (undated) DEVICE — DRAPE MAYO STAND 23X54 8337

## (undated) DEVICE — RX SURGIFLO HEMOSTATIC MATRIX W/THROMBIN 8ML NEXTGEN 2993

## (undated) DEVICE — SU SILK 3-0 SH 30" K832H

## (undated) DEVICE — KIT PATIENT CARE PROAXIS SYSTEM 6988-PV-ACP

## (undated) DEVICE — GOWN XLG DISP 9545

## (undated) DEVICE — POSITIONER ARMBOARD FOAM 1PAIR LF FP-ARMB1

## (undated) DEVICE — GLOVE PROTEXIS POWDER FREE 8.5 ORTHOPEDIC 2D73ET85

## (undated) DEVICE — DRSG TEGADERM 2 3/8X2 3/4" 1624W

## (undated) DEVICE — WIPES FOLEY CARE SURESTEP PROVON DFC100

## (undated) DEVICE — GOWN IMPERVIOUS ZONED XLG 9041

## (undated) DEVICE — DRAPE C-ARM W/STRAPS 42X72" 07-CA104

## (undated) DEVICE — DECANTER TRANSFER DEVICE 2008S

## (undated) DEVICE — MIDAS REX DISSECTING TOOL  14BA50

## (undated) DEVICE — GLOVE PROTEXIS BLUE W/NEU-THERA 7.0  2D73EB70

## (undated) DEVICE — ESU ELEC BLADE 2.75" COATED/INSULATED E1455

## (undated) DEVICE — SU VICRYL 0 CT-1 CR 8X27" UND JJ41G

## (undated) DEVICE — PAD ARMBOARD FOAM EGGCRATE COVIDEN 3114367

## (undated) DEVICE — PREP DURAPREP REMOVER 4OZ 8611

## (undated) DEVICE — SU PROLENE 5-0 C-1DA 36" 8720H

## (undated) DEVICE — SU VICRYL 2-0 CT-2 27" UND J269H

## (undated) DEVICE — MARKER SPHERES PASSIVE MEDT PACK 5 8801075

## (undated) DEVICE — DRAPE C-ARMOR 5 SIDED 5523

## (undated) DEVICE — PIN PERCUTANEOUS NAVIGATION FOR SPINE O-ARM 100MM 9733235

## (undated) DEVICE — DRSG AQUACEL AG 3.5X9.75" HYDROFIBER 412011

## (undated) DEVICE — GLOVE PROTEXIS W/NEU-THERA 8.5  2D73TE85

## (undated) DEVICE — SPONGE KITTNER 31001010

## (undated) DEVICE — SU MONOCRYL 3-0 PS-2 18" UND Y497G

## (undated) RX ORDER — PHENYLEPHRINE HCL IN 0.9% NACL 1 MG/10 ML
SYRINGE (ML) INTRAVENOUS
Status: DISPENSED
Start: 2019-04-22

## (undated) RX ORDER — FENTANYL CITRATE 50 UG/ML
INJECTION, SOLUTION INTRAMUSCULAR; INTRAVENOUS
Status: DISPENSED
Start: 2019-04-22

## (undated) RX ORDER — EPHEDRINE SULFATE 50 MG/ML
INJECTION, SOLUTION INTRAMUSCULAR; INTRAVENOUS; SUBCUTANEOUS
Status: DISPENSED
Start: 2019-04-22

## (undated) RX ORDER — BUPIVACAINE HYDROCHLORIDE 2.5 MG/ML
INJECTION, SOLUTION INFILTRATION; PERINEURAL
Status: DISPENSED
Start: 2017-12-12

## (undated) RX ORDER — ROCURONIUM BROMIDE 50 MG/5 ML
SYRINGE (ML) INTRAVENOUS
Status: DISPENSED
Start: 2017-12-12

## (undated) RX ORDER — CEFAZOLIN SODIUM 2 G/100ML
INJECTION, SOLUTION INTRAVENOUS
Status: DISPENSED
Start: 2017-12-12

## (undated) RX ORDER — DEXAMETHASONE SODIUM PHOSPHATE 4 MG/ML
INJECTION, SOLUTION INTRA-ARTICULAR; INTRALESIONAL; INTRAMUSCULAR; INTRAVENOUS; SOFT TISSUE
Status: DISPENSED
Start: 2017-12-12

## (undated) RX ORDER — ONDANSETRON 2 MG/ML
INJECTION INTRAMUSCULAR; INTRAVENOUS
Status: DISPENSED
Start: 2017-12-12

## (undated) RX ORDER — LIDOCAINE HYDROCHLORIDE 20 MG/ML
INJECTION, SOLUTION EPIDURAL; INFILTRATION; INTRACAUDAL; PERINEURAL
Status: DISPENSED
Start: 2019-04-22

## (undated) RX ORDER — FENTANYL CITRATE 50 UG/ML
INJECTION, SOLUTION INTRAMUSCULAR; INTRAVENOUS
Status: DISPENSED
Start: 2017-12-12

## (undated) RX ORDER — ONDANSETRON 2 MG/ML
INJECTION INTRAMUSCULAR; INTRAVENOUS
Status: DISPENSED
Start: 2019-04-22

## (undated) RX ORDER — HYDROMORPHONE HYDROCHLORIDE 1 MG/ML
INJECTION, SOLUTION INTRAMUSCULAR; INTRAVENOUS; SUBCUTANEOUS
Status: DISPENSED
Start: 2019-04-22

## (undated) RX ORDER — ACETAMINOPHEN 325 MG/1
TABLET ORAL
Status: DISPENSED
Start: 2019-04-22

## (undated) RX ORDER — OXYCODONE HYDROCHLORIDE 5 MG/1
TABLET ORAL
Status: DISPENSED
Start: 2017-12-12

## (undated) RX ORDER — PROPOFOL 10 MG/ML
INJECTION, EMULSION INTRAVENOUS
Status: DISPENSED
Start: 2019-04-22

## (undated) RX ORDER — VANCOMYCIN HYDROCHLORIDE 1 G/20ML
INJECTION, POWDER, LYOPHILIZED, FOR SOLUTION INTRAVENOUS
Status: DISPENSED
Start: 2019-04-22

## (undated) RX ORDER — SCOLOPAMINE TRANSDERMAL SYSTEM 1 MG/1
PATCH, EXTENDED RELEASE TRANSDERMAL
Status: DISPENSED
Start: 2019-04-22

## (undated) RX ORDER — BUPIVACAINE HYDROCHLORIDE AND EPINEPHRINE 2.5; 5 MG/ML; UG/ML
INJECTION, SOLUTION INFILTRATION; PERINEURAL
Status: DISPENSED
Start: 2019-04-22

## (undated) RX ORDER — CEFAZOLIN SODIUM 2 G/100ML
INJECTION, SOLUTION INTRAVENOUS
Status: DISPENSED
Start: 2019-04-22

## (undated) RX ORDER — PROPOFOL 10 MG/ML
INJECTION, EMULSION INTRAVENOUS
Status: DISPENSED
Start: 2017-12-12

## (undated) RX ORDER — GABAPENTIN 300 MG/1
CAPSULE ORAL
Status: DISPENSED
Start: 2019-04-22

## (undated) RX ORDER — CITRIC ACID/SODIUM CITRATE 334-500MG
SOLUTION, ORAL ORAL
Status: DISPENSED
Start: 2017-12-12

## (undated) RX ORDER — DEXAMETHASONE SODIUM PHOSPHATE 4 MG/ML
INJECTION, SOLUTION INTRA-ARTICULAR; INTRALESIONAL; INTRAMUSCULAR; INTRAVENOUS; SOFT TISSUE
Status: DISPENSED
Start: 2019-04-22

## (undated) RX ORDER — CALCIUM CHLORIDE 100 MG/ML
INJECTION INTRAVENOUS; INTRAVENTRICULAR
Status: DISPENSED
Start: 2019-04-22

## (undated) RX ORDER — CEFAZOLIN SODIUM 1 G/3ML
INJECTION, POWDER, FOR SOLUTION INTRAMUSCULAR; INTRAVENOUS
Status: DISPENSED
Start: 2019-04-22